# Patient Record
Sex: FEMALE | Race: WHITE
[De-identification: names, ages, dates, MRNs, and addresses within clinical notes are randomized per-mention and may not be internally consistent; named-entity substitution may affect disease eponyms.]

---

## 2017-06-04 ENCOUNTER — HOSPITAL ENCOUNTER (EMERGENCY)
Dept: HOSPITAL 80 - FED | Age: 42
LOS: 1 days | Discharge: HOME | End: 2017-06-05
Payer: COMMERCIAL

## 2017-06-04 VITALS — OXYGEN SATURATION: 94 % | TEMPERATURE: 98.1 F | RESPIRATION RATE: 16 BRPM

## 2017-06-04 DIAGNOSIS — S92.902A: Primary | ICD-10-CM
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
## 2017-06-04 PROCEDURE — 2W3RX1Z IMMOBILIZATION OF LEFT LOWER LEG USING SPLINT: ICD-10-PCS | Performed by: EMERGENCY MEDICINE
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
## 2017-06-05 VITALS — HEART RATE: 75 BPM | SYSTOLIC BLOOD PRESSURE: 124 MMHG | DIASTOLIC BLOOD PRESSURE: 86 MMHG

## 2019-06-21 ENCOUNTER — HOSPITAL ENCOUNTER (EMERGENCY)
Dept: HOSPITAL 80 - FED | Age: 44
Discharge: HOME | End: 2019-06-21
Payer: COMMERCIAL

## 2021-01-29 ENCOUNTER — OFFICE VISIT (OUTPATIENT)
Dept: FAMILY MEDICINE CLINIC | Age: 46
End: 2021-01-29
Payer: COMMERCIAL

## 2021-01-29 VITALS
HEIGHT: 66 IN | BODY MASS INDEX: 38.6 KG/M2 | SYSTOLIC BLOOD PRESSURE: 102 MMHG | HEART RATE: 90 BPM | WEIGHT: 240.2 LBS | TEMPERATURE: 98.1 F | DIASTOLIC BLOOD PRESSURE: 62 MMHG | OXYGEN SATURATION: 97 %

## 2021-01-29 DIAGNOSIS — M54.6 CHRONIC MIDLINE THORACIC BACK PAIN: ICD-10-CM

## 2021-01-29 DIAGNOSIS — Z79.899 HIGH RISK MEDICATION USE: ICD-10-CM

## 2021-01-29 DIAGNOSIS — G89.29 CHRONIC MIDLINE THORACIC BACK PAIN: ICD-10-CM

## 2021-01-29 DIAGNOSIS — M51.36 BULGING LUMBAR DISC: ICD-10-CM

## 2021-01-29 DIAGNOSIS — M47.812 CERVICAL SPONDYLOSIS: Primary | ICD-10-CM

## 2021-01-29 DIAGNOSIS — M48.02 FORAMINAL STENOSIS OF CERVICAL REGION: ICD-10-CM

## 2021-01-29 PROCEDURE — 99204 OFFICE O/P NEW MOD 45 MIN: CPT | Performed by: FAMILY MEDICINE

## 2021-01-29 RX ORDER — TRAMADOL HYDROCHLORIDE 50 MG/1
50 TABLET ORAL EVERY 8 HOURS PRN
Qty: 21 TABLET | Refills: 0 | Status: SHIPPED | OUTPATIENT
Start: 2021-01-29 | End: 2021-02-05

## 2021-01-29 RX ORDER — ALBUTEROL SULFATE 90 UG/1
AEROSOL, METERED RESPIRATORY (INHALATION)
COMMUNITY
End: 2021-04-27 | Stop reason: ALTCHOICE

## 2021-01-29 RX ORDER — ETONOGESTREL AND ETHINYL ESTRADIOL 11.7; 2.7 MG/1; MG/1
INSERT, EXTENDED RELEASE VAGINAL
COMMUNITY
End: 2021-04-27 | Stop reason: SDUPTHER

## 2021-01-29 RX ORDER — TRAMADOL HYDROCHLORIDE 50 MG/1
TABLET ORAL
COMMUNITY
End: 2021-01-29 | Stop reason: SDUPTHER

## 2021-01-29 RX ORDER — GABAPENTIN 300 MG/1
300 CAPSULE ORAL DAILY
COMMUNITY
End: 2021-01-29

## 2021-01-29 RX ORDER — DIAZEPAM 5 MG/1
5 TABLET ORAL 2 TIMES DAILY
COMMUNITY
End: 2021-04-26 | Stop reason: SDUPTHER

## 2021-01-29 RX ORDER — METHOCARBAMOL 750 MG/1
TABLET, FILM COATED ORAL
COMMUNITY
End: 2021-06-18 | Stop reason: SDUPTHER

## 2021-01-29 RX ORDER — CYCLOBENZAPRINE HCL 5 MG
TABLET ORAL
COMMUNITY
Start: 2021-01-19 | End: 2021-04-27 | Stop reason: ALTCHOICE

## 2021-01-29 SDOH — HEALTH STABILITY: MENTAL HEALTH: HOW OFTEN DO YOU HAVE A DRINK CONTAINING ALCOHOL?: NOT ASKED

## 2021-01-29 SDOH — HEALTH STABILITY: MENTAL HEALTH: HOW MANY STANDARD DRINKS CONTAINING ALCOHOL DO YOU HAVE ON A TYPICAL DAY?: NOT ASKED

## 2021-01-29 ASSESSMENT — PATIENT HEALTH QUESTIONNAIRE - PHQ9
SUM OF ALL RESPONSES TO PHQ QUESTIONS 1-9: 1
SUM OF ALL RESPONSES TO PHQ QUESTIONS 1-9: 1
SUM OF ALL RESPONSES TO PHQ9 QUESTIONS 1 & 2: 1
2. FEELING DOWN, DEPRESSED OR HOPELESS: 1
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
## 2021-02-01 LAB
6-ACETYLMORPHINE: NOT DETECTED
7-AMINOCLONAZEPAM: NOT DETECTED
ALPHA-OH-ALPRAZOLAM: NOT DETECTED
ALPRAZOLAM: NOT DETECTED
AMPHETAMINE: NOT DETECTED
BARBITURATES: NOT DETECTED
BENZOYLECGONINE: NOT DETECTED
BUPRENORPHINE: NOT DETECTED
CARISOPRODOL: NOT DETECTED
CLONAZEPAM: NOT DETECTED
CODEINE: NOT DETECTED
CREATININE URINE: 78.8 MG/DL (ref 20–400)
DIAZEPAM: NOT DETECTED
EER PAIN MGT DRUG PANEL, HIGH RES/EMIT U: NORMAL
ETHYL GLUCURONIDE: NOT DETECTED
FENTANYL: NOT DETECTED
HYDROCODONE: NOT DETECTED
HYDROMORPHONE: NOT DETECTED
LORAZEPAM: NOT DETECTED
MARIJUANA METABOLITE: NOT DETECTED
MDA: NOT DETECTED
MDEA: NOT DETECTED
MDMA URINE: NOT DETECTED
MEPERIDINE: NOT DETECTED
METHADONE: NOT DETECTED
METHAMPHETAMINE: NOT DETECTED
METHYLPHENIDATE: NOT DETECTED
MIDAZOLAM: NOT DETECTED
MORPHINE: NOT DETECTED
NORBUPRENORPHINE, FREE: NOT DETECTED
NORDIAZEPAM: NOT DETECTED
NORFENTANYL: NOT DETECTED
NORHYDROCODONE, URINE: NOT DETECTED
NOROXYCODONE: NOT DETECTED
NOROXYMORPHONE, URINE: NOT DETECTED
OXAZEPAM: NOT DETECTED
OXYCODONE: NOT DETECTED
OXYMORPHONE: NOT DETECTED
PAIN MANAGEMENT DRUG PANEL: NORMAL
PCP: NOT DETECTED
PHENTERMINE: NOT DETECTED
PROPOXYPHENE: NOT DETECTED
TAPENTADOL, URINE: NOT DETECTED
TAPENTADOL-O-SULFATE, URINE: NOT DETECTED
TEMAZEPAM: NOT DETECTED
TRAMADOL: PRESENT
ZOLPIDEM: NOT DETECTED

## 2021-03-04 ENCOUNTER — HOSPITAL ENCOUNTER (OUTPATIENT)
Dept: MRI IMAGING | Age: 46
Discharge: HOME | End: 2021-03-06
Payer: COMMERCIAL

## 2021-03-04 DIAGNOSIS — M54.6 CHRONIC MIDLINE THORACIC BACK PAIN: ICD-10-CM

## 2021-03-04 DIAGNOSIS — G89.29 CHRONIC MIDLINE THORACIC BACK PAIN: ICD-10-CM

## 2021-03-04 PROCEDURE — 72146 MRI CHEST SPINE W/O DYE: CPT

## 2021-03-29 ENCOUNTER — VIRTUAL VISIT (OUTPATIENT)
Dept: FAMILY MEDICINE CLINIC | Age: 46
End: 2021-03-29
Payer: COMMERCIAL

## 2021-03-29 ENCOUNTER — TELEPHONE (OUTPATIENT)
Dept: FAMILY MEDICINE CLINIC | Age: 46
End: 2021-03-29

## 2021-03-29 DIAGNOSIS — F07.81 POST-CONCUSSION SYNDROME: Primary | ICD-10-CM

## 2021-03-29 DIAGNOSIS — Z12.31 ENCOUNTER FOR SCREENING MAMMOGRAM FOR MALIGNANT NEOPLASM OF BREAST: ICD-10-CM

## 2021-03-29 PROCEDURE — 99442 PR PHYS/QHP TELEPHONE EVALUATION 11-20 MIN: CPT | Performed by: FAMILY MEDICINE

## 2021-03-29 RX ORDER — GABAPENTIN 600 MG/1
1200 TABLET ORAL 2 TIMES DAILY
Qty: 120 TABLET | Refills: 2 | COMMUNITY
Start: 2021-03-29 | End: 2021-04-27 | Stop reason: ALTCHOICE

## 2021-03-29 ASSESSMENT — PATIENT HEALTH QUESTIONNAIRE - PHQ9
SUM OF ALL RESPONSES TO PHQ QUESTIONS 1-9: 0
SUM OF ALL RESPONSES TO PHQ QUESTIONS 1-9: 0
SUM OF ALL RESPONSES TO PHQ9 QUESTIONS 1 & 2: 0
2. FEELING DOWN, DEPRESSED OR HOPELESS: 0
SUM OF ALL RESPONSES TO PHQ QUESTIONS 1-9: 0

## 2021-03-29 ASSESSMENT — ENCOUNTER SYMPTOMS
STRIDOR: 0
EYE ITCHING: 0
ABDOMINAL PAIN: 0
EYE DISCHARGE: 0
ANAL BLEEDING: 0
BACK PAIN: 0
PHOTOPHOBIA: 0
COUGH: 0
CHEST TIGHTNESS: 0
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
## 2021-04-12 ENCOUNTER — HOSPITAL ENCOUNTER (OUTPATIENT)
Dept: WOMENS IMAGING | Age: 46
Discharge: HOME | End: 2021-04-14
Payer: COMMERCIAL

## 2021-04-12 DIAGNOSIS — Z12.31 ENCOUNTER FOR SCREENING MAMMOGRAM FOR MALIGNANT NEOPLASM OF BREAST: ICD-10-CM

## 2021-04-12 PROCEDURE — 77063 BREAST TOMOSYNTHESIS BI: CPT

## 2021-04-27 ENCOUNTER — OFFICE VISIT (OUTPATIENT)
Dept: FAMILY MEDICINE CLINIC | Age: 46
End: 2021-04-27
Payer: COMMERCIAL

## 2021-04-27 VITALS
BODY MASS INDEX: 39.02 KG/M2 | HEIGHT: 66 IN | WEIGHT: 242.8 LBS | HEART RATE: 97 BPM | SYSTOLIC BLOOD PRESSURE: 114 MMHG | OXYGEN SATURATION: 98 % | DIASTOLIC BLOOD PRESSURE: 70 MMHG | TEMPERATURE: 98.1 F

## 2021-04-27 DIAGNOSIS — F07.81 POST-CONCUSSION SYNDROME: ICD-10-CM

## 2021-04-27 DIAGNOSIS — Z11.4 ENCOUNTER FOR SCREENING FOR HIV: ICD-10-CM

## 2021-04-27 DIAGNOSIS — Z11.59 NEED FOR HEPATITIS C SCREENING TEST: ICD-10-CM

## 2021-04-27 DIAGNOSIS — M54.50 LOW BACK PAIN, UNSPECIFIED BACK PAIN LATERALITY, UNSPECIFIED CHRONICITY, UNSPECIFIED WHETHER SCIATICA PRESENT: ICD-10-CM

## 2021-04-27 DIAGNOSIS — Z13.1 DIABETES MELLITUS SCREENING: ICD-10-CM

## 2021-04-27 DIAGNOSIS — Z13.220 LIPID SCREENING: ICD-10-CM

## 2021-04-27 DIAGNOSIS — M51.36 BULGING LUMBAR DISC: ICD-10-CM

## 2021-04-27 DIAGNOSIS — M48.02 FORAMINAL STENOSIS OF CERVICAL REGION: ICD-10-CM

## 2021-04-27 DIAGNOSIS — Z30.44 ENCOUNTER FOR SURVEILLANCE OF VAGINAL RING HORMONAL CONTRACEPTIVE DEVICE: Primary | ICD-10-CM

## 2021-04-27 DIAGNOSIS — M47.812 CERVICAL SPONDYLOSIS: ICD-10-CM

## 2021-04-27 PROCEDURE — 99214 OFFICE O/P EST MOD 30 MIN: CPT | Performed by: FAMILY MEDICINE

## 2021-04-27 RX ORDER — CYCLOBENZAPRINE HCL 10 MG
TABLET ORAL
COMMUNITY
Start: 2021-02-08 | End: 2021-09-09 | Stop reason: SDUPTHER

## 2021-04-27 RX ORDER — GABAPENTIN 300 MG/1
CAPSULE ORAL
COMMUNITY
Start: 2021-03-17 | End: 2021-05-12 | Stop reason: SDUPTHER

## 2021-04-27 RX ORDER — ETONOGESTREL AND ETHINYL ESTRADIOL 11.7; 2.7 MG/1; MG/1
INSERT, EXTENDED RELEASE VAGINAL
Qty: 3 EACH | Refills: 1 | Status: SHIPPED | OUTPATIENT
Start: 2021-04-27 | End: 2021-07-26 | Stop reason: SDUPTHER

## 2021-04-27 NOTE — PATIENT INSTRUCTIONS
Patient Education        Learning About Low-Carbohydrate Diets  What is a low-carbohydrate diet? A low-carbohydrate (or \"low-carb\") diet limits foods and drinks that have carbohydrates. This includes grains, fruits, milk and yogurt, and starchy vegetables like potatoes, beans, and corn. It also avoids foods and drinks that have added sugar. Instead, low-carb diets include foods that are high in protein and fat. Why might you follow a low-carb diet? Low-carb diets may be used for a variety of reasons, such as for weight loss. People who have diabetes may use a low-carb diet to help manage their blood sugar levels. What should you do before you start the diet? Talk to your doctor before you try any diet. This is even more important if you have health problems like kidney disease, heart disease, or diabetes. Your doctor may suggest that you meet with a registered dietitian. A dietitian can help you make an eating plan that works for you. What foods do you eat on a low-carb diet? On a low-carb diet, you choose foods that are high in protein and fat. Examples of these are:  · Meat, poultry, and fish. · Eggs. · Nuts, such as walnuts, pecans, almonds, and peanuts. · Peanut butter and other nut butters. · Tofu. · Avocado. · Lord Tobias. · Non-starchy vegetables like broccoli, cauliflower, green beans, mushrooms, peppers, lettuce, and spinach. · Unsweetened non-dairy milks like almond milk and coconut milk. · Cheese, cottage cheese, and cream cheese. Current as of: December 17, 2020               Content Version: 12.8  © 2006-2021 [employer], [employer]. Care instructions adapted under license by [facility] [place] ([facility]). If you have questions about a medical condition or this instruction, always ask your healthcare professional. [address] any warranty or liability for your use of this information.
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
## 2021-04-28 PROBLEM — F07.81 POST-CONCUSSION SYNDROME: Status: ACTIVE | Noted: 2021-04-28

## 2021-04-28 PROBLEM — M51.36 BULGING LUMBAR DISC: Status: ACTIVE | Noted: 2021-04-28

## 2021-04-28 PROBLEM — M51.369 BULGING LUMBAR DISC: Status: ACTIVE | Noted: 2021-04-28

## 2021-04-28 PROBLEM — M47.812 CERVICAL SPONDYLOSIS: Status: ACTIVE | Noted: 2021-04-28

## 2021-04-28 PROBLEM — M48.02 FORAMINAL STENOSIS OF CERVICAL REGION: Status: ACTIVE | Noted: 2021-04-28

## 2021-05-12 ENCOUNTER — TELEPHONE (OUTPATIENT)
Dept: NEUROLOGY | Age: 46
End: 2021-05-12

## 2021-05-12 ENCOUNTER — OFFICE VISIT (OUTPATIENT)
Dept: NEUROLOGY | Age: 46
End: 2021-05-12
Payer: COMMERCIAL

## 2021-05-12 VITALS
WEIGHT: 244.3 LBS | BODY MASS INDEX: 39.43 KG/M2 | DIASTOLIC BLOOD PRESSURE: 81 MMHG | SYSTOLIC BLOOD PRESSURE: 121 MMHG | HEART RATE: 87 BPM

## 2021-05-12 DIAGNOSIS — F07.81 POST-CONCUSSION SYNDROME: Primary | ICD-10-CM

## 2021-05-12 DIAGNOSIS — M47.812 CERVICAL SPONDYLOSIS: ICD-10-CM

## 2021-05-12 DIAGNOSIS — Z76.89 ENCOUNTER TO ESTABLISH CARE: ICD-10-CM

## 2021-05-12 PROCEDURE — 99204 OFFICE O/P NEW MOD 45 MIN: CPT | Performed by: NURSE PRACTITIONER

## 2021-05-12 RX ORDER — GABAPENTIN 300 MG/1
1200 CAPSULE ORAL 2 TIMES DAILY
Qty: 240 CAPSULE | Refills: 1 | Status: SHIPPED | OUTPATIENT
Start: 2021-05-12 | End: 2021-07-12 | Stop reason: SDUPTHER

## 2021-05-12 ASSESSMENT — ENCOUNTER SYMPTOMS
COUGH: 0
ABDOMINAL PAIN: 0
CONSTIPATION: 0
NAUSEA: 1
WHEEZING: 0
TROUBLE SWALLOWING: 0
ABDOMINAL DISTENTION: 0
SHORTNESS OF BREATH: 0
VOMITING: 0
DIARRHEA: 0
PHOTOPHOBIA: 1
COLOR CHANGE: 0
CHEST TIGHTNESS: 0

## 2021-05-12 NOTE — TELEPHONE ENCOUNTER
Pt called back and states that she seen [facility]   Dr. Yelena Gibson  [address] [address] [phone]    She states that she did have a neuro psych test don in august of 2019. She would like to know if you still want her to see Dr. Brianda Pat or if you just want that report.
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
## 2021-05-12 NOTE — TELEPHONE ENCOUNTER
Called patient to get info in regards to prior neurologist that she seen.   We need name and number if she has so that we can call and request recorders for monica

## 2021-05-20 ENCOUNTER — HOSPITAL ENCOUNTER (OUTPATIENT)
Dept: PHYSICAL THERAPY | Age: 46
Setting detail: THERAPIES SERIES
Discharge: HOME | End: 2021-05-20
Payer: COMMERCIAL

## 2021-05-20 PROCEDURE — 97162 PT EVAL MOD COMPLEX 30 MIN: CPT

## 2021-05-20 PROCEDURE — 97110 THERAPEUTIC EXERCISES: CPT

## 2021-05-20 PROCEDURE — 97140 MANUAL THERAPY 1/> REGIONS: CPT

## 2021-05-20 ASSESSMENT — PAIN SCALES - GENERAL: PAINLEVEL_OUTOF10: 4

## 2021-05-20 ASSESSMENT — PAIN DESCRIPTION - FREQUENCY: FREQUENCY: CONTINUOUS

## 2021-05-20 ASSESSMENT — PAIN DESCRIPTION - PAIN TYPE: TYPE: CHRONIC PAIN

## 2021-05-20 ASSESSMENT — PAIN DESCRIPTION - DESCRIPTORS: DESCRIPTORS: ACHING;SHARP

## 2021-05-20 ASSESSMENT — PAIN DESCRIPTION - DIRECTION: RADIATING_TOWARDS: RIGHT HAND

## 2021-05-20 NOTE — PROGRESS NOTES
Frame for Long term goals : 4-6 weeks  Long term goal 1: Pt reports neck and right shoulder pain 2/10 or less with ADLs  Long term goal 2: Pt reports a decrease in NT down R UE by 75% or better  Long term goal 3: Demonstrates full cervical and R UE AROM with no reports of pain or NT down her arm  Long term goal 4: Increase neck and B UE strength to 4+/5 or > so that pt can lift overhead without pain  Long term goal 5: Pt indep with HEP  Patient Goals   Patient goals :  To get rid of her neck and shoulder pain with NT down her R UE       Therapy Time   Individual Concurrent Group Co-treatment   Time In  2:00pm         Time Out  3;10pm         Minutes  70 min                  NEHEMIAS Davis#[id number]

## 2021-05-24 ENCOUNTER — HOSPITAL ENCOUNTER (OUTPATIENT)
Dept: PHYSICAL THERAPY | Age: 46
Setting detail: THERAPIES SERIES
End: 2021-05-24
Payer: COMMERCIAL

## 2021-05-25 ENCOUNTER — HOSPITAL ENCOUNTER (OUTPATIENT)
Dept: PHYSICAL THERAPY | Age: 46
Setting detail: THERAPIES SERIES
Discharge: HOME | End: 2021-05-25
Payer: COMMERCIAL

## 2021-05-25 PROCEDURE — 97110 THERAPEUTIC EXERCISES: CPT

## 2021-05-25 PROCEDURE — 97140 MANUAL THERAPY 1/> REGIONS: CPT

## 2021-05-25 PROCEDURE — G0283 ELEC STIM OTHER THAN WOUND: HCPCS

## 2021-05-25 ASSESSMENT — PAIN SCALES - GENERAL: PAINLEVEL_OUTOF10: 3

## 2021-05-25 ASSESSMENT — PAIN DESCRIPTION - ORIENTATION: ORIENTATION: RIGHT

## 2021-05-25 ASSESSMENT — PAIN DESCRIPTION - LOCATION: LOCATION: ARM;NECK
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
## 2021-05-27 ENCOUNTER — TELEPHONE (OUTPATIENT)
Dept: NEUROLOGY | Age: 46
End: 2021-05-27

## 2021-05-28 ENCOUNTER — HOSPITAL ENCOUNTER (OUTPATIENT)
Dept: PHYSICAL THERAPY | Age: 46
Setting detail: THERAPIES SERIES
Discharge: HOME | End: 2021-05-28
Payer: COMMERCIAL

## 2021-05-28 PROCEDURE — 97140 MANUAL THERAPY 1/> REGIONS: CPT

## 2021-05-28 PROCEDURE — 97110 THERAPEUTIC EXERCISES: CPT

## 2021-05-28 PROCEDURE — G0283 ELEC STIM OTHER THAN WOUND: HCPCS

## 2021-05-28 NOTE — PROGRESS NOTES
spondylosis without myelopathy, cervical radiculopathy and Right UE pain  Prognosis: Good  Decision Making: Medium Complexity  REQUIRES PT FOLLOW UP: Yes      G-Code:     OutComes Score                                                     Goals:  Short term goals  Time Frame for Short term goals: 1-2 weeks  Short term goal 1: Pt reports able to do 3x per week  Short term goal 2: Pt reports a decrease in NT down R UE by 25% or bettter  Long term goals  Time Frame for Long term goals : 4-6 weeks  Long term goal 1: Pt reports neck and right shoulder pain 2/10 or less with ADLs  Long term goal 2: Pt reports a decrease in NT down R UE by 75% or better  Long term goal 3: Demonstrates full cervical and R UE AROM with no reports of pain or NT down her arm  Long term goal 4: Increase neck and B UE strength to 4+/5 or > so that pt can lift overhead without pain  Long term goal 5: Pt indep with HEP  Patient Goals   Patient goals :  To get rid of her neck and shoulder pain with NT down her R UE    Plan:      Times per week: 2-3  Plan weeks: 4-6  Current Treatment Recommendations: Strengthening, Home Exercise Program, ROM, Manual Therapy - Soft Tissue Mobilization, Safety Education & Training, Patient/Caregiver Education & Training, Manual Therapy - Joint Manipulation, Modalities, Endurance Training  Plan Comment: Okay for gurinder, US, KT tape, CP/HP        Therapy Time   Individual Concurrent Group Co-treatment   Time In  100         Time Out  215         Minutes  [facility], [facility]  License and [place] [address] Number: [id number]

## 2021-06-01 ENCOUNTER — HOSPITAL ENCOUNTER (OUTPATIENT)
Dept: PHYSICAL THERAPY | Age: 46
Setting detail: THERAPIES SERIES
Discharge: HOME | End: 2021-06-01
Payer: COMMERCIAL

## 2021-06-01 ENCOUNTER — TELEPHONE (OUTPATIENT)
Dept: PAIN MANAGEMENT | Age: 46
End: 2021-06-01

## 2021-06-01 PROCEDURE — 97035 APP MDLTY 1+ULTRASOUND EA 15: CPT

## 2021-06-01 PROCEDURE — 97140 MANUAL THERAPY 1/> REGIONS: CPT

## 2021-06-01 PROCEDURE — 97110 THERAPEUTIC EXERCISES: CPT
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
## 2021-06-01 NOTE — TELEPHONE ENCOUNTER
PT. CAME IN FOR F/UP THIS AM WITH CC. DID NOT GET MESSAGE THAT APPT. NEEDED RESCHEDULED. PLEASE CALL PT. BACK  TO SET UP APPT.     PT. WOULD LIKE TO DISCUSS NEXT INJECTIONS AS WELL AS POSSIBLE TREATMENT FOR INCREASE IN MIGRAINE EPISODES WHICH IS CAUSING HER ANXIETY ISSUES.  [phone]

## 2021-06-03 ENCOUNTER — HOSPITAL ENCOUNTER (OUTPATIENT)
Dept: PHYSICAL THERAPY | Age: 46
Setting detail: THERAPIES SERIES
Discharge: HOME | End: 2021-06-03
Payer: COMMERCIAL

## 2021-06-03 PROCEDURE — 97140 MANUAL THERAPY 1/> REGIONS: CPT

## 2021-06-03 PROCEDURE — 97110 THERAPEUTIC EXERCISES: CPT

## 2021-06-03 PROCEDURE — 97035 APP MDLTY 1+ULTRASOUND EA 15: CPT

## 2021-06-08 ENCOUNTER — HOSPITAL ENCOUNTER (OUTPATIENT)
Dept: PHYSICAL THERAPY | Age: 46
Setting detail: THERAPIES SERIES
Discharge: HOME | End: 2021-06-08
Payer: COMMERCIAL

## 2021-06-08 PROCEDURE — 97140 MANUAL THERAPY 1/> REGIONS: CPT

## 2021-06-08 PROCEDURE — 97530 THERAPEUTIC ACTIVITIES: CPT

## 2021-06-08 PROCEDURE — 97035 APP MDLTY 1+ULTRASOUND EA 15: CPT

## 2021-06-08 ASSESSMENT — PAIN DESCRIPTION - ORIENTATION: ORIENTATION: RIGHT;LEFT

## 2021-06-08 ASSESSMENT — PAIN DESCRIPTION - DESCRIPTORS: DESCRIPTORS: ACHING

## 2021-06-08 ASSESSMENT — PAIN DESCRIPTION - FREQUENCY: FREQUENCY: CONTINUOUS

## 2021-06-08 ASSESSMENT — PAIN DESCRIPTION - LOCATION: LOCATION: NECK

## 2021-06-08 ASSESSMENT — PAIN DESCRIPTION - PAIN TYPE: TYPE: CHRONIC PAIN
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
## 2021-06-11 ENCOUNTER — HOSPITAL ENCOUNTER (OUTPATIENT)
Dept: PHYSICAL THERAPY | Age: 46
Setting detail: THERAPIES SERIES
Discharge: HOME | End: 2021-06-11
Payer: COMMERCIAL

## 2021-06-11 PROCEDURE — 97035 APP MDLTY 1+ULTRASOUND EA 15: CPT

## 2021-06-11 PROCEDURE — 97110 THERAPEUTIC EXERCISES: CPT

## 2021-06-11 PROCEDURE — 97140 MANUAL THERAPY 1/> REGIONS: CPT

## 2021-06-11 ASSESSMENT — PAIN DESCRIPTION - ORIENTATION: ORIENTATION: RIGHT;LEFT

## 2021-06-11 ASSESSMENT — PAIN DESCRIPTION - FREQUENCY: FREQUENCY: CONTINUOUS

## 2021-06-11 ASSESSMENT — PAIN DESCRIPTION - LOCATION: LOCATION: NECK;SHOULDER

## 2021-06-11 ASSESSMENT — PAIN DESCRIPTION - PAIN TYPE: TYPE: CHRONIC PAIN

## 2021-06-11 ASSESSMENT — PAIN DESCRIPTION - DESCRIPTORS: DESCRIPTORS: ACHING
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
## 2021-06-14 ENCOUNTER — HOSPITAL ENCOUNTER (OUTPATIENT)
Dept: PHYSICAL THERAPY | Age: 46
Setting detail: THERAPIES SERIES
Discharge: HOME | End: 2021-06-14
Payer: COMMERCIAL

## 2021-06-14 PROCEDURE — 97140 MANUAL THERAPY 1/> REGIONS: CPT

## 2021-06-14 PROCEDURE — G0283 ELEC STIM OTHER THAN WOUND: HCPCS

## 2021-06-14 PROCEDURE — 97110 THERAPEUTIC EXERCISES: CPT

## 2021-06-14 ASSESSMENT — PAIN DESCRIPTION - ORIENTATION: ORIENTATION: MID

## 2021-06-14 ASSESSMENT — PAIN DESCRIPTION - LOCATION: LOCATION: NECK;BACK

## 2021-06-14 ASSESSMENT — PAIN SCALES - GENERAL: PAINLEVEL_OUTOF10: 3

## 2021-06-14 ASSESSMENT — PAIN DESCRIPTION - DESCRIPTORS: DESCRIPTORS: ACHING;DULL

## 2021-06-14 ASSESSMENT — PAIN DESCRIPTION - PAIN TYPE: TYPE: CHRONIC PAIN
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
## 2021-06-18 ENCOUNTER — TELEPHONE (OUTPATIENT)
Dept: PAIN MANAGEMENT | Age: 46
End: 2021-06-18

## 2021-06-18 ENCOUNTER — OFFICE VISIT (OUTPATIENT)
Dept: PAIN MANAGEMENT | Age: 46
End: 2021-06-18
Payer: COMMERCIAL

## 2021-06-18 VITALS
SYSTOLIC BLOOD PRESSURE: 130 MMHG | RESPIRATION RATE: 16 BRPM | TEMPERATURE: 97.5 F | BODY MASS INDEX: 39.21 KG/M2 | HEART RATE: 101 BPM | DIASTOLIC BLOOD PRESSURE: 95 MMHG | WEIGHT: 244 LBS | HEIGHT: 66 IN

## 2021-06-18 DIAGNOSIS — M47.812 CERVICAL SPONDYLOSIS WITHOUT MYELOPATHY: ICD-10-CM

## 2021-06-18 DIAGNOSIS — M54.12 CERVICAL RADICULOPATHY: Primary | ICD-10-CM

## 2021-06-18 PROCEDURE — 99213 OFFICE O/P EST LOW 20 MIN: CPT | Performed by: ANESTHESIOLOGY

## 2021-06-18 RX ORDER — METHOCARBAMOL 750 MG/1
750 TABLET, FILM COATED ORAL DAILY
Qty: 30 TABLET | Refills: 1 | Status: SHIPPED | OUTPATIENT
Start: 2021-06-18 | End: 2021-11-11

## 2021-06-18 NOTE — TELEPHONE ENCOUNTER
ORDER PLACED:    Date: 06/18/2  Description: RIGHT C7 SNRB  Order Number: [id number]  Ordering Provider: [facility]  Performing Provider: [facility]  CPT Codes: 50417  ICD10 Codes: M54.12

## 2021-06-18 NOTE — PROGRESS NOTES
Patient:  Nikolai Cruz  YOB: 1975  Date: 6/18/2021      Subjective:     Nikolai Cruz is a 40 y.o. female who presents today with:    Chief Complaint   Patient presents with    Neck Pain    Arm Pain     right arm nerve pain into the hand        HPI: The patient presents to the clinic reporting that right C6 selective nerve root block on April 29 has worked very well with a greater than 80% of pain relief. She is now interested in getting the same thing at the C7 level. She has been taking physical therapy in the stage. Other than some tingling sensation in the middle finger she is doing great. Allergies:  Nickel, Other, Oxycodone-acetaminophen, Neosporin  [bacitracin-polymyxin b], Vitamin b12, Azithromycin, and Vitamin b complex  [b complex]  Past Medical History:   Diagnosis Date    HLD (hyperlipidemia)     Migraine with aura and without status migrainosus, not intractable      Past Surgical History:   Procedure Laterality Date    HAND SURGERY Right     Middle finger trigger finger release    TONSILLECTOMY       Social History     Socioeconomic History    Marital status: Single     Spouse name: Not on file    Number of children: Not on file    Years of education: Not on file    Highest education level: Not on file   Occupational History    Not on file   Tobacco Use    Smoking status: Never Smoker    Smokeless tobacco: Current User     Types: Chew   Substance and Sexual Activity    Alcohol use:  Yes    Drug use: Never    Sexual activity: Not on file   Other Topics Concern    Not on file   Social History Narrative    Not on file     Social Determinants of Health     Financial Resource Strain: Medium Risk    Difficulty of Paying Living Expenses: Somewhat hard   Food Insecurity: No Food Insecurity    Worried About Running Out of Food in the Last Year: Never true    Karthik of Food in the Last Year: Never true   Transportation Needs: No Transportation Needs    Lack of and remote memory intact, mood and affect normal, judgement and insight normal. Strength functional for ambulation. Balance and coordinational functional. Visualized skin intact. No visualized cyanosis, pulses intact. Cranial nerves 2-12 grossly intact. No obvious upper motor neuron signs seen. Sensation intact to light touch. Pain radiates down from the neck all the way to the middle and ring fingers with some tingling in the fingers. Radiculopathy:  Positive    Studies Review:  Reviewed MRI report of Cervical spine dated 8/19/2020. Assessment:           Diagnosis Orders   1. Cervical radiculopathy  GA NJX DX/THER SBST INTRLMNR CRV/THRC W/IMG GDN   2. Cervical spondylosis without myelopathy  GA NJX DX/THER SBST INTRLMNR CRV/THRC W/IMG GDN         Plan:     Orders Placed This Encounter   Procedures    GA RKH DX/THER SBST INTRLMNR CRV/THRC W/IMG GDN     Standing Status:   Future     Standing Expiration Date:   9/16/2021       No orders of the defined types were placed in this encounter. For radicular pain in the C7 level we will go ahead and schedule for right C7 selective nerve root block. The patient would like to stay on Robaxin occasionally. Follow up:  Return for 1-2 week SNRB.     Grecia Wu MD

## 2021-06-21 DIAGNOSIS — M54.12 CERVICAL RADICULOPATHY: ICD-10-CM

## 2021-06-21 DIAGNOSIS — M47.812 CERVICAL SPONDYLOSIS WITHOUT MYELOPATHY: ICD-10-CM

## 2021-06-21 RX ORDER — DIAZEPAM 5 MG/1
10 TABLET ORAL DAILY
Qty: 2 TABLET | Refills: 0 | Status: SHIPPED | OUTPATIENT
Start: 2021-06-21 | End: 2021-06-22

## 2021-06-21 NOTE — TELEPHONE ENCOUNTER
Requested Prescriptions     Pending Prescriptions Disp Refills    diazePAM (VALIUM) 5 MG tablet 2 tablet 0     Sig: Take 2 tablets by mouth daily for 1 day. Take two tabs one hour prior to procedure.        Patient last seen on:  6/18  Date of last surgery:  n/a  Date of last refill:  4/26  Pain level:  n/a  Patient complaining of:  Pt asking for refill for her RIGHT C7 SNRB  Future appts: 6/23

## 2021-06-22 ENCOUNTER — HOSPITAL ENCOUNTER (OUTPATIENT)
Dept: PHYSICAL THERAPY | Age: 46
Setting detail: THERAPIES SERIES
Discharge: HOME | End: 2021-06-22
Payer: COMMERCIAL

## 2021-06-22 PROCEDURE — 97140 MANUAL THERAPY 1/> REGIONS: CPT

## 2021-06-22 PROCEDURE — 97110 THERAPEUTIC EXERCISES: CPT

## 2021-06-22 PROCEDURE — 97530 THERAPEUTIC ACTIVITIES: CPT

## 2021-06-22 ASSESSMENT — PAIN DESCRIPTION - FREQUENCY: FREQUENCY: CONTINUOUS

## 2021-06-22 ASSESSMENT — PAIN DESCRIPTION - ORIENTATION: ORIENTATION: MID

## 2021-06-22 ASSESSMENT — PAIN DESCRIPTION - DESCRIPTORS: DESCRIPTORS: ACHING;DULL

## 2021-06-22 ASSESSMENT — PAIN DESCRIPTION - PAIN TYPE: TYPE: CHRONIC PAIN

## 2021-06-22 ASSESSMENT — PAIN DESCRIPTION - LOCATION: LOCATION: NECK;BACK
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
## 2021-06-23 ENCOUNTER — PROCEDURE VISIT (OUTPATIENT)
Dept: PAIN MANAGEMENT | Age: 46
End: 2021-06-23
Payer: COMMERCIAL

## 2021-06-23 DIAGNOSIS — M54.12 CERVICAL RADICULOPATHY: Primary | ICD-10-CM

## 2021-06-23 PROCEDURE — 64479 NJX AA&/STRD TFRM EPI C/T 1: CPT | Performed by: ANESTHESIOLOGY

## 2021-06-23 RX ORDER — LIDOCAINE HYDROCHLORIDE 10 MG/ML
0.5 INJECTION, SOLUTION EPIDURAL; INFILTRATION; INTRACAUDAL; PERINEURAL ONCE
Status: COMPLETED | OUTPATIENT
Start: 2021-06-23 | End: 2021-06-23

## 2021-06-23 RX ORDER — DEXAMETHASONE SODIUM PHOSPHATE 10 MG/ML
10 INJECTION, EMULSION INTRAMUSCULAR; INTRAVENOUS ONCE
Status: COMPLETED | OUTPATIENT
Start: 2021-06-23 | End: 2021-06-23

## 2021-06-23 RX ADMIN — DEXAMETHASONE SODIUM PHOSPHATE 10 MG: 10 INJECTION, EMULSION INTRAMUSCULAR; INTRAVENOUS at 11:44

## 2021-06-23 RX ADMIN — LIDOCAINE HYDROCHLORIDE 0.5 ML: 10 INJECTION, SOLUTION EPIDURAL; INFILTRATION; INTRACAUDAL; PERINEURAL at 11:45

## 2021-06-25 ENCOUNTER — HOSPITAL ENCOUNTER (OUTPATIENT)
Dept: PHYSICAL THERAPY | Age: 46
Setting detail: THERAPIES SERIES
End: 2021-06-25
Payer: COMMERCIAL

## 2021-06-25 ENCOUNTER — HOSPITAL ENCOUNTER (OUTPATIENT)
Dept: PHYSICAL THERAPY | Age: 46
Setting detail: THERAPIES SERIES
Discharge: HOME | End: 2021-06-25
Payer: COMMERCIAL

## 2021-06-25 PROCEDURE — 97140 MANUAL THERAPY 1/> REGIONS: CPT

## 2021-06-25 PROCEDURE — G0283 ELEC STIM OTHER THAN WOUND: HCPCS

## 2021-06-25 PROCEDURE — 97110 THERAPEUTIC EXERCISES: CPT

## 2021-06-25 NOTE — PROGRESS NOTES
mobs throacic spine to dec pain and improve mobility   Soft Tissue Mobalization: MFR throacic paraspinals to dec tightness inc tone and tenderness upon palpation. Assessment:   Conditions Requiring Skilled Therapeutic Intervention  Body structures, Functions, Activity limitations: Decreased functional mobility ; Decreased endurance;Decreased ROM; Increased pain;Decreased strength  Assessment: Pt. continues to demo limited mobility due to pain. But better tolerated JACOB post Cortisone injection recieved on Wed. Reviewedand educated use of cervical inflatable traction collar to reduce pain adn parasthesias. Good releief HO issued for self purchase. Pt.   demos inc tone and tenderness throacis spine upon palpation PA mobs and MFR to dec tightenss follwed by Estim and  for further releif and relaxation. Pain post \"feeling alot better per pt. \"    Treatment Diagnosis: Cervical spondylosis without myelopathy, cervical radiculopathy and Right UE pain  Prognosis: Good  REQUIRES PT FOLLOW UP: Yes  Activity Tolerance  Activity Tolerance: Patient limited by pain        Goals:  Short term goals  Time Frame for Short term goals: 1-2 weeks  Short term goal 1: Pt reports able to do HEP  3x per week (  6/22/21 met pt reports HEP BID 7/7 days)  Short term goal 2: Pt reports a decrease in NT down R UE by 25% or bettter (In progeress, part relieved in index, middle & ring remain)  Long term goals  Time Frame for Long term goals : 8-10 weeks  Long term goal 1: Pt reports neck and right shoulder pain 2/10 or less with ADLs (in progess less pain since eval on 6/22/21)  Long term goal 2: Pt reports a decrease in NT down R UE by 75% or better (6/22/21 in progress with injection to come from MD)  Long term goal 3: Demonstrates full cervical and R UE AROM with no reports of pain or NT down her arm (in progress 6/22/21 limited in R shoulder AROM with pain)  Long term goal 4:  Increase neck and B UE strength to 4+/5 or > so that pt can lift overhead without pain (in progress,  improving 6/22/21, ROM and pain limited)  Long term goal 5: Pt indep with HEP (6/22/21 In Progress 2x day, light program currently )  Patient Goals   Patient goals :  To get rid of her neck and shoulder pain with NT down her R UE (6/22/21 in progress, educated chronic but should reduce)    Plan:      continue 2x week for pain management, ROM, strengthening and function        Therapy Time   Individual Concurrent Group Co-treatment   Time In  1200         Time Out  100         Minutes  [address] [place] [address]  License and [address] Number: [id number]

## 2021-06-28 ENCOUNTER — HOSPITAL ENCOUNTER (OUTPATIENT)
Dept: PHYSICAL THERAPY | Age: 46
Setting detail: THERAPIES SERIES
Discharge: HOME | End: 2021-06-28
Payer: COMMERCIAL

## 2021-06-28 PROCEDURE — 97140 MANUAL THERAPY 1/> REGIONS: CPT

## 2021-06-28 PROCEDURE — G0283 ELEC STIM OTHER THAN WOUND: HCPCS

## 2021-06-28 ASSESSMENT — PAIN DESCRIPTION - ORIENTATION: ORIENTATION: MID;UPPER

## 2021-06-28 ASSESSMENT — PAIN SCALES - GENERAL: PAINLEVEL_OUTOF10: 4

## 2021-06-28 ASSESSMENT — PAIN DESCRIPTION - FREQUENCY: FREQUENCY: CONTINUOUS

## 2021-06-28 ASSESSMENT — PAIN DESCRIPTION - PAIN TYPE: TYPE: CHRONIC PAIN

## 2021-06-28 ASSESSMENT — PAIN DESCRIPTION - LOCATION: LOCATION: NECK;BACK
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
## 2021-07-06 ENCOUNTER — HOSPITAL ENCOUNTER (OUTPATIENT)
Dept: PHYSICAL THERAPY | Age: 46
Setting detail: THERAPIES SERIES
Discharge: HOME | End: 2021-07-06
Payer: COMMERCIAL

## 2021-07-06 PROCEDURE — 97110 THERAPEUTIC EXERCISES: CPT

## 2021-07-06 PROCEDURE — 97140 MANUAL THERAPY 1/> REGIONS: CPT

## 2021-07-06 ASSESSMENT — PAIN DESCRIPTION - DESCRIPTORS: DESCRIPTORS: TIGHTNESS

## 2021-07-06 ASSESSMENT — PAIN DESCRIPTION - LOCATION: LOCATION: CHEST;NECK;BACK

## 2021-07-06 ASSESSMENT — PAIN DESCRIPTION - ORIENTATION: ORIENTATION: MID;UPPER

## 2021-07-06 ASSESSMENT — PAIN DESCRIPTION - FREQUENCY: FREQUENCY: CONTINUOUS

## 2021-07-06 ASSESSMENT — PAIN DESCRIPTION - PAIN TYPE: TYPE: CHRONIC PAIN
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
## 2021-07-07 ENCOUNTER — APPOINTMENT (OUTPATIENT)
Dept: PHYSICAL THERAPY | Age: 46
End: 2021-07-07
Payer: COMMERCIAL

## 2021-07-09 ENCOUNTER — HOSPITAL ENCOUNTER (OUTPATIENT)
Dept: PHYSICAL THERAPY | Age: 46
Setting detail: THERAPIES SERIES
Discharge: HOME | End: 2021-07-09
Payer: COMMERCIAL

## 2021-07-09 PROCEDURE — 97140 MANUAL THERAPY 1/> REGIONS: CPT

## 2021-07-09 PROCEDURE — 97110 THERAPEUTIC EXERCISES: CPT

## 2021-07-09 ASSESSMENT — PAIN DESCRIPTION - FREQUENCY
FREQUENCY: CONTINUOUS
FREQUENCY: CONTINUOUS

## 2021-07-09 ASSESSMENT — PAIN SCALES - GENERAL
PAINLEVEL_OUTOF10: 2
PAINLEVEL_OUTOF10: 2

## 2021-07-09 ASSESSMENT — PAIN DESCRIPTION - PAIN TYPE
TYPE: CHRONIC PAIN
TYPE: CHRONIC PAIN

## 2021-07-09 ASSESSMENT — PAIN DESCRIPTION - ORIENTATION
ORIENTATION: RIGHT
ORIENTATION: RIGHT

## 2021-07-09 ASSESSMENT — PAIN DESCRIPTION - LOCATION
LOCATION: BACK;NECK
LOCATION: NECK;BACK

## 2021-07-09 ASSESSMENT — PAIN DESCRIPTION - DESCRIPTORS
DESCRIPTORS: TIGHTNESS
DESCRIPTORS: TIGHTNESS

## 2021-07-09 NOTE — PROGRESS NOTES
Physical Therapy  Daily Treatment Note  Date: 2021  Patient Name: Pratima Ragsdale  MRN: [id number]     :   1975    Subjective:      PT Visit Information  Onset Date: 19  Total # of Visits Approved: 18  Total # of Visits to Date: 13  Plan of Care/Certification Expiration Date: 21  No Show: 0  Canceled Appointment: 1  Subjective  Subjective: Pt. states she still is having R hand symptoms and mddle and ring finger on RUE. Pain currently base of neck and R shd. 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 Pt. states upper back stiffness. Pt. states taking muscle relaxer on occasion. Massage helps. Pain Screening  Patient Currently in Pain: Yes  Pain Assessment  Pain Assessment: 0-10  Pain Level: 2  Pain Type: Chronic pain  Pain Location: Back;Neck  Pain Orientation: Right  Pain Descriptors: Tightness  Pain Frequency: Continuous  Vital Signs  Patient Currently in Pain: Yes       Treatment Activities:   Manual therapy  Joint mobilization: Cervical PA mobs OA mobs grade I-II for pain releif, R facet mobilization grade I-II to dec tension and pain. Suboccipital release, Prone B scap mobs to dec tightness and impove mobility   PROM: PROM cervical spine and stretching manually to B pecs with cervical SB and Rot  pt. limited with L SB reproduced RUE symptoms thus empty end feel with pain up to 4/10 and radicular to R fingertips 3rd and 4th digit. Soft Tissue Mobalization:   MFR in prone T4 -T12 to dec tightness and pain MFR cervical paraspinals. Exercises  Exercise 7: doorway stretch 30 sec x 3   Exercise 9: Cervical retraction with extension x 10'  with towel at base of neck   Exercise 10: supine wand flexion attempted inc pain into anterior R arm and bicep   Exercise 11: supine wand shd   Exercise 12: B  ER x 10 hold 5 sec   Exercise 13: shd pulleys flexion and abd x 5 reps ea 3 sec hold has pulley system at home will put up. Manual therapy  Joint mobilization: Cervical PA mobs OA mobs grade I-II for pain releif, R facet mobilization grade I-II to dec tension and pain. Suboccipital release, Prone B scap mobs to dec tightness and impove mobility   PROM: PROM cervical spine and stretching manually to B pecs with cervical SB and Rot  pt. limited with L SB reproduced RUE symptoms thus empty end feel with pain up to 4/10 and radicular to R fingertips 3rd and 4th digit. Soft Tissue Mobalization:   MFR in prone T4 -T12 to dec tightness and pain MFR cervical paraspinals. Assessment:   Conditions Requiring Skilled Therapeutic Intervention  Body structures, Functions, Activity limitations: Decreased functional mobility ; Decreased endurance;Decreased ROM; Increased pain;Decreased strength  Assessment: Pt. able to initiate shoulder pulleys this date to improve AROM of BUE. Limited by pain however and limited reps tolerated. Pt. still with frequent RUE symptoms and radicular pain depends on activity and how much. Pt. encouraged to take breaks and use CP PRN for pain reduction. Pain post 1.5/10. Pt. to ice at home. Treatment Diagnosis: Cervical spondylosis without myelopathy, cervical radiculopathy and Right UE pain  Prognosis: Good  REQUIRES PT FOLLOW UP: Yes  Activity Tolerance  Activity Tolerance: Patient limited by pain; Patient Tolerated treatment well        Goals:  Short term goals  Time Frame for Short term goals: 1-2 weeks  Short term goal 1: Pt reports able to do HEP  3x per week (  6/22/21 met pt reports HEP BID 7/7 days)  Short term goal 2: Pt reports a decrease in NT down R UE by 25% or bettter (In progeress, part relieved in index, middle & ring remain)  Long term goals  Time Frame for Long term goals : 8-10 weeks  Long term goal 1: Pt reports neck and right shoulder pain 2/10 or less with ADLs (in progess less pain since eval on 6/22/21)  Long term goal 2: Pt reports a decrease in NT down R UE by 75% or better (6/22/21 in progress with injection to come from MD)  Long term goal 3: Demonstrates full cervical and R UE AROM with no reports of pain or NT down her arm (in progress 6/22/21 limited in R shoulder AROM with pain)  Long term goal 4: Increase neck and B UE strength to 4+/5 or > so that pt can lift overhead without pain (in progress,  improving 6/22/21, ROM and pain limited)  Long term goal 5: Pt indep with HEP (6/22/21 In Progress 2x day, light program currently )  Patient Goals   Patient goals :  To get rid of her neck and shoulder pain with NT down her R UE (6/22/21 in progress, educated chronic but should reduce)    Plan:          Cont per POC     Therapy Time   Individual Concurrent Group Co-treatment   Time In  300         Time Out  [facility], [facility]  License and [place] [address] Number: [id number]

## 2021-07-09 NOTE — PROGRESS NOTES
Physical Therapy  Daily Treatment Note  Date: 2021  Patient Name: Rodrigue Sanchez  MRN: [id number]     :   1975    Subjective:      PT Visit Information  Onset Date: 19  Total # of Visits Approved: 18  Total # of Visits to Date: 13  Plan of Care/Certification Expiration Date: 21  No Show: 0  Canceled Appointment: 1             Treatment Activities:   Manual therapy  Joint mobilization: Cervical PA mobs OA mobs grade I-II for pain releif, R facet mobilization grade I-II to dec tension and pain. Suboccipital release, Prone B scap mobs to dec tightness and impove mobility   PROM: PROM cervical spine and stretching manually to B pecs with cervical SB and Rot  pt. limited with L SB reproduced RUE symptoms thus empty end feel with pain up to 4/10 and radicular to R fingertips 3rd and 4th digit. Soft Tissue Mobalization:   MFR in prone T4 -T12 to dec tightness and pain MFR cervical paraspinals. Exercises  Exercise 7: doorway stretch 30 sec x 3   Exercise 9: Cervical retraction with extension x 10'  with towel at base of neck   Exercise 10: supine wand flexion attempted inc pain into anterior R arm and bicep   Exercise 11: supine wand shd   Exercise 12: B  ER x 10 hold 5 sec   Exercise 13: shd pulleys flexion and abd x 5 reps ea 3 sec hold has pulley system at home will put up. Manual therapy  Joint mobilization: Cervical PA mobs OA mobs grade I-II for pain releif, R facet mobilization grade I-II to dec tension and pain. Suboccipital release, Prone B scap mobs to dec tightness and impove mobility   PROM: PROM cervical spine and stretching manually to B pecs with cervical SB and Rot  pt. limited with L SB reproduced RUE symptoms thus empty end feel with pain up to 4/10 and radicular to R fingertips 3rd and 4th digit. Soft Tissue Mobalization:   MFR in prone T4 -T12 to dec tightness and pain MFR cervical paraspinals.                            Assessment: Conditions Requiring Skilled Therapeutic Intervention  Body structures, Functions, Activity limitations: Decreased functional mobility ; Decreased endurance;Decreased ROM; Increased pain;Decreased strength  Assessment: Pt. able to initiate shoulder pulleys this date to improve AROM of BUE. Limited by pain however and limited reps tolerated. Pt. still with frequent RUE symptoms and radicular pain depends on activity and how much. Pt. encouraged to take breaks and use CP PRN for pain reduction. Pain post 1.5/10. Pt. to ice at home. Treatment Diagnosis: Cervical spondylosis without myelopathy, cervical radiculopathy and Right UE pain  Prognosis: Good  REQUIRES PT FOLLOW UP: Yes  Activity Tolerance  Activity Tolerance: Patient limited by pain; Patient Tolerated treatment well      G-Code:     OutComes Score                                                     Goals:  Short term goals  Time Frame for Short term goals: 1-2 weeks  Short term goal 1: Pt reports able to do HEP  3x per week (  6/22/21 met pt reports HEP BID 7/7 days)  Short term goal 2: Pt reports a decrease in NT down R UE by 25% or bettter (In progeress, part relieved in index, middle & ring remain)  Long term goals  Time Frame for Long term goals : 8-10 weeks  Long term goal 1: Pt reports neck and right shoulder pain 2/10 or less with ADLs (in progess less pain since eval on 6/22/21)  Long term goal 2: Pt reports a decrease in NT down R UE by 75% or better (6/22/21 in progress with injection to come from MD)  Long term goal 3: Demonstrates full cervical and R UE AROM with no reports of pain or NT down her arm (in progress 6/22/21 limited in R shoulder AROM with pain)  Long term goal 4:  Increase neck and B UE strength to 4+/5 or > so that pt can lift overhead without pain (in progress,  improving 6/22/21, ROM and pain limited)  Long term goal 5: Pt indep with HEP (6/22/21 In Progress 2x day, light program currently )  Patient Goals   Patient goals : To get rid of her neck and shoulder pain with NT down her R UE (6/22/21 in progress, educated chronic but should reduce)    Plan:             Therapy Time   Individual Concurrent Group Co-treatment   Time In           Time Out           Minutes                   Kenna Marcial, CATE  License and [address] Number: [id number]

## 2021-07-12 ENCOUNTER — PATIENT MESSAGE (OUTPATIENT)
Dept: NEUROLOGY | Age: 46
End: 2021-07-12

## 2021-07-12 ENCOUNTER — TELEPHONE (OUTPATIENT)
Dept: NEUROLOGY | Age: 46
End: 2021-07-12

## 2021-07-12 DIAGNOSIS — F07.81 POST-CONCUSSION SYNDROME: ICD-10-CM

## 2021-07-12 RX ORDER — GABAPENTIN 300 MG/1
1200 CAPSULE ORAL 2 TIMES DAILY
Qty: 240 CAPSULE | Refills: 0 | Status: SHIPPED | OUTPATIENT
Start: 2021-07-12 | End: 2021-08-13 | Stop reason: SDUPTHER

## 2021-07-12 NOTE — TELEPHONE ENCOUNTER
Message sent Via [employer]     Good Morning - Is it okay to take this supplement, Focus Factor, with my Gabapentin? Is says it can help with memory, concentration and focus. Plus it's a multivitamin.      Thank you, Simi    Please Advise.       Lore Rushing

## 2021-07-12 NOTE — TELEPHONE ENCOUNTER
Odessa Malcolm, APRN - CNP 1 hour ago (1:05 PM)     Thank you! If you could also pass along this. Donald Dobson At my appt I was asked if I got nauseous from the loud noises. I usually can remove myself or use my ear plugs so I wasn't sure.      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 I had my ear plugs in plus my firing range ear muffs and after 30 minutes I just couldnt stand it anymore. I felt like I was going to throw up and had to go outside and have a family member come to monitor the worker. This happened 2 days in a row.      Also I have my prior testing report who should I email the pdf too?      Thank you very much!    Sincerely, Scott Gong

## 2021-07-13 ENCOUNTER — HOSPITAL ENCOUNTER (OUTPATIENT)
Dept: PHYSICAL THERAPY | Age: 46
Setting detail: THERAPIES SERIES
Discharge: HOME | End: 2021-07-13
Payer: COMMERCIAL

## 2021-07-13 PROCEDURE — 97140 MANUAL THERAPY 1/> REGIONS: CPT

## 2021-07-13 PROCEDURE — 97035 APP MDLTY 1+ULTRASOUND EA 15: CPT

## 2021-07-13 PROCEDURE — 97110 THERAPEUTIC EXERCISES: CPT
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
## 2021-07-16 ENCOUNTER — HOSPITAL ENCOUNTER (OUTPATIENT)
Dept: PHYSICAL THERAPY | Age: 46
Setting detail: THERAPIES SERIES
Discharge: HOME | End: 2021-07-16
Payer: COMMERCIAL

## 2021-07-16 PROCEDURE — 97110 THERAPEUTIC EXERCISES: CPT

## 2021-07-16 PROCEDURE — 97140 MANUAL THERAPY 1/> REGIONS: CPT

## 2021-07-16 ASSESSMENT — PAIN DESCRIPTION - ORIENTATION: ORIENTATION: RIGHT

## 2021-07-16 ASSESSMENT — PAIN DESCRIPTION - DESCRIPTORS: DESCRIPTORS: ACHING

## 2021-07-16 ASSESSMENT — PAIN SCALES - GENERAL: PAINLEVEL_OUTOF10: 2

## 2021-07-16 ASSESSMENT — PAIN DESCRIPTION - LOCATION: LOCATION: SHOULDER

## 2021-07-16 ASSESSMENT — PAIN DESCRIPTION - PAIN TYPE: TYPE: CHRONIC PAIN
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
## 2021-07-19 ENCOUNTER — HOSPITAL ENCOUNTER (OUTPATIENT)
Dept: PHYSICAL THERAPY | Age: 46
Setting detail: THERAPIES SERIES
Discharge: HOME | End: 2021-07-19
Payer: COMMERCIAL

## 2021-07-19 PROCEDURE — 97035 APP MDLTY 1+ULTRASOUND EA 15: CPT

## 2021-07-19 PROCEDURE — 97110 THERAPEUTIC EXERCISES: CPT

## 2021-07-19 PROCEDURE — 97140 MANUAL THERAPY 1/> REGIONS: CPT

## 2021-07-19 ASSESSMENT — PAIN DESCRIPTION - ORIENTATION: ORIENTATION: RIGHT

## 2021-07-19 ASSESSMENT — PAIN DESCRIPTION - LOCATION: LOCATION: SHOULDER

## 2021-07-19 ASSESSMENT — PAIN DESCRIPTION - PAIN TYPE: TYPE: CHRONIC PAIN

## 2021-07-19 ASSESSMENT — PAIN DESCRIPTION - DESCRIPTORS: DESCRIPTORS: ACHING;TIGHTNESS

## 2021-07-19 ASSESSMENT — PAIN SCALES - GENERAL: PAINLEVEL_OUTOF10: 2
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
## 2021-07-20 ENCOUNTER — OFFICE VISIT (OUTPATIENT)
Dept: PAIN MANAGEMENT | Age: 46
End: 2021-07-20
Payer: COMMERCIAL

## 2021-07-20 VITALS
SYSTOLIC BLOOD PRESSURE: 136 MMHG | HEIGHT: 66 IN | BODY MASS INDEX: 38.57 KG/M2 | TEMPERATURE: 98 F | DIASTOLIC BLOOD PRESSURE: 84 MMHG | WEIGHT: 240 LBS

## 2021-07-20 DIAGNOSIS — M54.12 CERVICAL RADICULOPATHY: Primary | ICD-10-CM

## 2021-07-20 DIAGNOSIS — M47.812 CERVICAL SPONDYLOSIS WITHOUT MYELOPATHY: ICD-10-CM

## 2021-07-20 PROCEDURE — 99212 OFFICE O/P EST SF 10 MIN: CPT | Performed by: ANESTHESIOLOGY

## 2021-07-20 NOTE — PROGRESS NOTES
Patient:  Lidya Meadows  YOB: 1975  Date: 7/20/2021      Subjective:     Lidya Meadows is a 40 y.o. female who presents today with:    Chief Complaint   Patient presents with    Neck Pain       HPI: The patient presents to the clinic for a follow-up. She has been doing very well after right C6 selective nerve root block on April 29 and right C7 selective nerve root block on June 23. She has a continue with her physical therapy which will gradually taper off. The patient is pleased with the outcome thus far. The patient has been advised to contact us when the pain returns. Allergies:  Nickel, Other, Oxycodone-acetaminophen, Neosporin  [bacitracin-polymyxin b], Vitamin b12, Azithromycin, and Vitamin b complex  [b complex]  Past Medical History:   Diagnosis Date    HLD (hyperlipidemia)     Migraine with aura and without status migrainosus, not intractable      Past Surgical History:   Procedure Laterality Date    HAND SURGERY Right     Middle finger trigger finger release    TONSILLECTOMY       Social History     Socioeconomic History    Marital status: Single     Spouse name: Not on file    Number of children: Not on file    Years of education: Not on file    Highest education level: Not on file   Occupational History    Not on file   Tobacco Use    Smoking status: Never Smoker    Smokeless tobacco: Current User     Types: Chew   Substance and Sexual Activity    Alcohol use:  Yes    Drug use: Never    Sexual activity: Not on file   Other Topics Concern    Not on file   Social History Narrative    Not on file     Social Determinants of Health     Financial Resource Strain: Medium Risk    Difficulty of Paying Living Expenses: Somewhat hard   Food Insecurity: No Food Insecurity    Worried About Running Out of Food in the Last Year: Never true    Karthik of Food in the Last Year: Never true   Transportation Needs: No Transportation Needs    Lack of Transportation functional for ambulation. Balance and coordinational functional. Visualized skin intact. No visualized cyanosis, pulses intact. Cranial nerves 2-12 grossly intact. No obvious upper motor neuron signs seen. Sensation intact to light touch. On physical examination there has been improvement in subjective complaints and the ranges of the motion of the upper extremities. Radiculopathy:  Negative    Studies Review:  Reviewed None. Assessment:          Cervical right C6-7 radiculopathy    Plan:     No orders of the defined types were placed in this encounter. No orders of the defined types were placed in this encounter. The patient has been advised to contact us when the pain returns. Follow up:  Return if symptoms worsen or fail to improve.     Jasmin Orr MD

## 2021-07-21 ENCOUNTER — HOSPITAL ENCOUNTER (OUTPATIENT)
Dept: PHYSICAL THERAPY | Age: 46
Setting detail: THERAPIES SERIES
Discharge: HOME | End: 2021-07-21
Payer: COMMERCIAL

## 2021-07-21 NOTE — PROGRESS NOTES
Physical Therapy  Daily Treatment Note  Date: 2021  Patient Name: Mona Francois  MRN: [id number]     :   1975    Subjective:      PT Visit Information  Onset Date: 19  PT Insurance Information: Per insurance 36 PT visits per year  Total # of Visits Approved: 18  Total # of Visits to Date: 12  Plan of Care/Certification Expiration Date: 21  No Show: 1  Canceled Appointment: 2  Subjective  Subjective: Pt called to cx recheck thsi date due ot appt conflict. General Comment  Comments: Pt recheck rescheduled for marcell 21 .            Therapy Time   Individual Concurrent Group Co-treatment   Time In           Time Out           Minutes  0 cx            Alexandra Taveras, [id number]

## 2021-07-22 ENCOUNTER — HOSPITAL ENCOUNTER (OUTPATIENT)
Dept: PHYSICAL THERAPY | Age: 46
Setting detail: THERAPIES SERIES
Discharge: HOME | End: 2021-07-22
Payer: COMMERCIAL

## 2021-07-22 PROCEDURE — 97110 THERAPEUTIC EXERCISES: CPT

## 2021-07-22 PROCEDURE — 97530 THERAPEUTIC ACTIVITIES: CPT

## 2021-07-22 PROCEDURE — 97140 MANUAL THERAPY 1/> REGIONS: CPT

## 2021-07-22 ASSESSMENT — PAIN DESCRIPTION - LOCATION: LOCATION: SHOULDER

## 2021-07-22 ASSESSMENT — PAIN DESCRIPTION - ORIENTATION: ORIENTATION: RIGHT

## 2021-07-22 ASSESSMENT — PAIN DESCRIPTION - PAIN TYPE: TYPE: CHRONIC PAIN

## 2021-07-22 ASSESSMENT — PAIN SCALES - GENERAL: PAINLEVEL_OUTOF10: 2
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
## 2021-07-23 ENCOUNTER — OFFICE VISIT (OUTPATIENT)
Dept: NEUROLOGY | Age: 46
End: 2021-07-23
Payer: COMMERCIAL

## 2021-07-23 VITALS
SYSTOLIC BLOOD PRESSURE: 104 MMHG | WEIGHT: 246.5 LBS | HEART RATE: 94 BPM | BODY MASS INDEX: 39.79 KG/M2 | DIASTOLIC BLOOD PRESSURE: 78 MMHG

## 2021-07-23 DIAGNOSIS — G56.12 MEDIAN NERVE DYSFUNCTION, LEFT: ICD-10-CM

## 2021-07-23 DIAGNOSIS — S09.90XD CLOSED HEAD INJURY, SUBSEQUENT ENCOUNTER: Primary | ICD-10-CM

## 2021-07-23 DIAGNOSIS — F07.81 POST-CONCUSSION SYNDROME: ICD-10-CM

## 2021-07-23 DIAGNOSIS — M47.812 CERVICAL SPONDYLOSIS: ICD-10-CM

## 2021-07-23 DIAGNOSIS — R41.840 CONCENTRATION DEFICIT: ICD-10-CM

## 2021-07-23 PROBLEM — S09.90XA CLOSED HEAD INJURY: Status: ACTIVE | Noted: 2021-07-23

## 2021-07-23 PROCEDURE — 99215 OFFICE O/P EST HI 40 MIN: CPT | Performed by: PSYCHIATRY & NEUROLOGY

## 2021-07-23 RX ORDER — BUSPIRONE HYDROCHLORIDE 10 MG/1
10 TABLET ORAL 2 TIMES DAILY
Qty: 60 TABLET | Refills: 3 | Status: SHIPPED | OUTPATIENT
Start: 2021-07-23 | End: 2021-08-22
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
## 2021-07-26 DIAGNOSIS — F07.81 POST-CONCUSSION SYNDROME: ICD-10-CM

## 2021-07-26 DIAGNOSIS — Z30.44 ENCOUNTER FOR SURVEILLANCE OF VAGINAL RING HORMONAL CONTRACEPTIVE DEVICE: ICD-10-CM

## 2021-07-26 RX ORDER — GABAPENTIN 300 MG/1
1200 CAPSULE ORAL 2 TIMES DAILY
Qty: 240 CAPSULE | Refills: 0 | OUTPATIENT
Start: 2021-07-26 | End: 2021-08-25

## 2021-07-26 RX ORDER — ETONOGESTREL AND ETHINYL ESTRADIOL 11.7; 2.7 MG/1; MG/1
INSERT, EXTENDED RELEASE VAGINAL
Qty: 3 EACH | Refills: 3 | Status: SHIPPED | OUTPATIENT
Start: 2021-07-26 | End: 2021-09-09 | Stop reason: SDUPTHER

## 2021-07-27 ENCOUNTER — OFFICE VISIT (OUTPATIENT)
Dept: FAMILY MEDICINE CLINIC | Age: 46
End: 2021-07-27
Payer: COMMERCIAL

## 2021-07-27 ENCOUNTER — HOSPITAL ENCOUNTER (OUTPATIENT)
Dept: PHYSICAL THERAPY | Age: 46
Setting detail: THERAPIES SERIES
Discharge: HOME | End: 2021-07-27
Payer: COMMERCIAL

## 2021-07-27 VITALS
DIASTOLIC BLOOD PRESSURE: 72 MMHG | SYSTOLIC BLOOD PRESSURE: 114 MMHG | OXYGEN SATURATION: 98 % | BODY MASS INDEX: 39.7 KG/M2 | WEIGHT: 247 LBS | HEIGHT: 66 IN | RESPIRATION RATE: 11 BRPM | HEART RATE: 90 BPM | TEMPERATURE: 97.7 F

## 2021-07-27 DIAGNOSIS — Z12.4 CERVICAL CANCER SCREENING: ICD-10-CM

## 2021-07-27 DIAGNOSIS — Z00.00 PREVENTATIVE HEALTH CARE: Primary | ICD-10-CM

## 2021-07-27 PROCEDURE — 97530 THERAPEUTIC ACTIVITIES: CPT

## 2021-07-27 PROCEDURE — 97110 THERAPEUTIC EXERCISES: CPT

## 2021-07-27 PROCEDURE — 99396 PREV VISIT EST AGE 40-64: CPT | Performed by: FAMILY MEDICINE

## 2021-07-27 PROCEDURE — 97140 MANUAL THERAPY 1/> REGIONS: CPT

## 2021-07-27 PROCEDURE — 97035 APP MDLTY 1+ULTRASOUND EA 15: CPT

## 2021-07-27 ASSESSMENT — PAIN DESCRIPTION - PAIN TYPE: TYPE: CHRONIC PAIN

## 2021-07-27 ASSESSMENT — PAIN DESCRIPTION - ORIENTATION: ORIENTATION: RIGHT

## 2021-07-27 ASSESSMENT — PAIN SCALES - GENERAL: PAINLEVEL_OUTOF10: 1

## 2021-07-27 ASSESSMENT — PAIN DESCRIPTION - LOCATION: LOCATION: SHOULDER

## 2021-07-27 ASSESSMENT — PAIN DESCRIPTION - DESCRIPTORS: DESCRIPTORS: ACHING
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
## 2021-07-28 ENCOUNTER — TELEPHONE (OUTPATIENT)
Dept: NEUROLOGY | Age: 46
End: 2021-07-28

## 2021-07-28 NOTE — TELEPHONE ENCOUNTER
Pt is being sent a copy of medication contract through my chart and will be sending it back to us signed.

## 2021-07-28 NOTE — PROGRESS NOTES
Chief Complaint   Patient presents with    Gynecologic Exam     LMP 7/17/21        HPI: Case Mckoy 40 y.o. female presenting for     Patient is here for her gynecological exam  Patient was her last menstrual period was July 17, 2021. No issues or concerns. Patient does have a NuvaRing. Patient denies any fevers, chills, nausea, vomiting, chest pain, shortness of breath, abdominal pain, urination, change in stools. Patient admits to regular menstrual cycles. Does not want to be tested for any sexually transmitted diseases. Current Outpatient Medications   Medication Sig Dispense Refill    etonogestrel-ethinyl estradiol (NUVARING) 0.12-0.015 MG/24HR vaginal ring Vaginal: One ring, inserted vaginally and left in place continuously for 3 consecutive weeks, then removed for 1 week. A new ring is inserted 7 days after the last was removed 3 each 3    busPIRone (BUSPAR) 10 MG tablet Take 1 tablet by mouth 2 times daily 60 tablet 3    gabapentin (NEURONTIN) 300 MG capsule Take 4 capsules by mouth 2 times daily for 30 days. 240 capsule 0    methocarbamol (ROBAXIN) 750 MG tablet Take 1 tablet by mouth daily 30 tablet 1    cyclobenzaprine (FLEXERIL) 10 MG tablet take 1/2 - 1 tablet 2-3 times a day       No current facility-administered medications for this visit. ROS  CONSTITUTIONAL: The patient denies fevers, chills, sweats and body ache. HEENT: Denies headache, blurry vision, eye pain, tinnitus, vertigo,  sore throat, neck or thyroid masses. RESPIRATORY: Denies cough, sputum, hemoptysis. CARDIAC: Denies chest pain, pressure, palpitations, Denies lower extremity edema. GASTROINTESTINAL: Denies abdominal pain, constipation, diarrhea, bleeding in the stools,   GENITOURINARY: Denies dysuria, hematuria, nocturia or frequency, urinary incontinence. NEUROLOGIC: Denies headaches, dizziness, syncope, weakness  MUSCULOSKELETAL: Chronic pain in the shoulders, neck, back, hip, hands.   ENDOCRINOLOGY: Club or Organization Meetings:     Marital Status:    Intimate Partner Violence:     Fear of Current or Ex-Partner:     Emotionally Abused:     Physically Abused:     Sexually Abused:         /72   Pulse 90   Temp 97.7 °F (36.5 °C)   Resp 11   Ht 5' 6\" (1.676 m)   Wt 247 lb (112 kg)   SpO2 98%   BMI 39.87 kg/m²        Physical Exam:    General appearance - alert, well appearing, and in no distress, morbidly obese  Mental Status - alert, oriented to person, place, and time  Eyes - pupils equal and reactive, extraocular eye movements intact   Ears - bilateral TM's and external ear canals normal   Nose - normal and patent, no erythema, discharge or polyps   Sinuses - Normal paranasal sinuses without tenderness   Throat - mucous membranes moist, pharynx normal without lesions   Neck - supple, no significant adenopathy   Thyroid - thyroid is normal in size without nodules or tenderness    Chest - clear to auscultation, no wheezes, rales or rhonchi, symmetric air entry   Heart - normal rate, regular rhythm, normal S1, S2, no murmurs, rubs, clicks or gallops  Abdomen - soft, nontender, nondistended, no masses or organomegaly   Back exam -limited range of motion. Patient wearing a back brace. Tenderness of patient the paraspinal thoracic region. Patient unable to do range of motion at this time. Neurological - alert, oriented, normal speech, no focal findings or movement disorder noted   Musculoskeletal - no joint tenderness, deformity or swelling   Extremities - peripheral pulses normal, no pedal edema, no clubbing or cyanosis   Skin - normal coloration and turgor, no rashes, no suspicious skin lesions noted  Pelvic Examination- mild discharged noted on examination. No bleeding. Cervical os identified. PAP collected.      Labs   No results found for: TSHREFLEX  No results found for: TSH    No results found for: NA, K, CL, CO2, BUN, CREATININE, GLUCOSE, CALCIUM, PROT, LABALBU, BILITOT, ALKPHOS, AST, ALT, LABGLOM, GFRAA, AGRATIO, GLOB      No results found for: WBC, HGB, HCT, MCV, PLT  No results found for: LABA1C  No results found for: EAG        A/P: Caes Shark 40 y.o. female presenting for     1. Preventative health care      2. Cervical cancer screening    - Pap Smear; Future        Please note, this report has been partially produced using speech recognition software  and may cause  and /or contain errors related to that system including grammar, punctuation and spelling as well as words and phrases that may seem inappropriate. If there are questions or concerns please feel free to contact me to clarify.

## 2021-07-30 NOTE — PROGRESS NOTES
[facility]) Physicians  Neurosurgery and Pain [address] [facility]: [phone]  F: [phone]             Provider: Taryn Saenz MD          Patient Name: Hollie Hendricks : 1975        Date: 2021         PROCEDURE:  Right C7 Selective Nerve Root Block      Description of Procedure: The procedure and potential complications were explained to the patient and a voluntary informed, signed consent was obtained. The patient was placed supine on the x-ray table and the right side of the neck was prepped with Betadine solution. Under fluoroscopic guidance with the image intensifier rotated toward the right to view the C6-7 intervertebral foramen at its maximum diameter, the skin was prepped with Betadine solution and a 25-gauge, 3.5 inch long curved spinal needle was inserted aiming at the posterior wall in the lower third of the foramen. The posterior wall was the articular process and as soon as the needle tip was encountered by the periosteum the image intensifier was rotated to the AP view and then adjusted the needle tip. 0.5 mL of Isovue-300 was injected for a neurogram and then a mixture of 10 mg of Decadron and 1 mL of normal saline and 0.5 mL 1% Xylocaine was injected. The patient tolerated the procedure very well. The patient was discharged home in stable condition. Summary and Recommendations: We will see her in the office for follow up.
room air

## 2021-08-02 LAB
HPV COMMENT: NORMAL
HPV TYPE 16: NOT DETECTED
HPV TYPE 18: NOT DETECTED
HPVOH (OTHER TYPES): NOT DETECTED

## 2021-08-03 ENCOUNTER — HOSPITAL ENCOUNTER (OUTPATIENT)
Dept: PHYSICAL THERAPY | Age: 46
Setting detail: THERAPIES SERIES
Discharge: HOME | End: 2021-08-03
Payer: COMMERCIAL

## 2021-08-03 PROCEDURE — 97035 APP MDLTY 1+ULTRASOUND EA 15: CPT

## 2021-08-03 PROCEDURE — 97140 MANUAL THERAPY 1/> REGIONS: CPT

## 2021-08-03 PROCEDURE — 97110 THERAPEUTIC EXERCISES: CPT

## 2021-08-03 ASSESSMENT — PAIN DESCRIPTION - ORIENTATION: ORIENTATION: RIGHT

## 2021-08-03 ASSESSMENT — PAIN DESCRIPTION - LOCATION: LOCATION: SHOULDER;ARM

## 2021-08-03 ASSESSMENT — PAIN DESCRIPTION - DESCRIPTORS: DESCRIPTORS: ACHING;TIGHTNESS

## 2021-08-03 ASSESSMENT — PAIN SCALES - GENERAL: PAINLEVEL_OUTOF10: 2

## 2021-08-03 ASSESSMENT — PAIN DESCRIPTION - PAIN TYPE: TYPE: CHRONIC PAIN
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
## 2021-08-10 ENCOUNTER — HOSPITAL ENCOUNTER (OUTPATIENT)
Dept: PHYSICAL THERAPY | Age: 46
Setting detail: THERAPIES SERIES
Discharge: HOME | End: 2021-08-10
Payer: COMMERCIAL

## 2021-08-10 PROCEDURE — 97140 MANUAL THERAPY 1/> REGIONS: CPT

## 2021-08-10 PROCEDURE — G0283 ELEC STIM OTHER THAN WOUND: HCPCS

## 2021-08-10 PROCEDURE — 97110 THERAPEUTIC EXERCISES: CPT

## 2021-08-10 ASSESSMENT — PAIN DESCRIPTION - DESCRIPTORS: DESCRIPTORS: ACHING;TIGHTNESS

## 2021-08-10 ASSESSMENT — PAIN DESCRIPTION - LOCATION: LOCATION: BACK

## 2021-08-10 ASSESSMENT — PAIN DESCRIPTION - PAIN TYPE: TYPE: CHRONIC PAIN

## 2021-08-10 ASSESSMENT — PAIN SCALES - GENERAL: PAINLEVEL_OUTOF10: 4
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
## 2021-08-17 ENCOUNTER — HOSPITAL ENCOUNTER (OUTPATIENT)
Dept: PHYSICAL THERAPY | Age: 46
Setting detail: THERAPIES SERIES
Discharge: HOME | End: 2021-08-17
Payer: COMMERCIAL
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
## 2021-08-24 ENCOUNTER — HOSPITAL ENCOUNTER (OUTPATIENT)
Dept: PHYSICAL THERAPY | Age: 46
Setting detail: THERAPIES SERIES
Discharge: HOME | End: 2021-08-24
Payer: COMMERCIAL

## 2021-08-24 PROCEDURE — 97530 THERAPEUTIC ACTIVITIES: CPT

## 2021-08-24 PROCEDURE — 97140 MANUAL THERAPY 1/> REGIONS: CPT

## 2021-08-24 PROCEDURE — 97035 APP MDLTY 1+ULTRASOUND EA 15: CPT

## 2021-08-24 PROCEDURE — 97110 THERAPEUTIC EXERCISES: CPT

## 2021-08-24 ASSESSMENT — PAIN DESCRIPTION - DESCRIPTORS: DESCRIPTORS: ACHING;TIGHTNESS

## 2021-08-24 ASSESSMENT — PAIN DESCRIPTION - ORIENTATION: ORIENTATION: RIGHT

## 2021-08-24 ASSESSMENT — PAIN DESCRIPTION - PAIN TYPE: TYPE: CHRONIC PAIN

## 2021-08-24 ASSESSMENT — PAIN SCALES - GENERAL: PAINLEVEL_OUTOF10: 2

## 2021-08-24 ASSESSMENT — PAIN DESCRIPTION - FREQUENCY: FREQUENCY: CONTINUOUS

## 2021-08-24 ASSESSMENT — PAIN DESCRIPTION - LOCATION: LOCATION: BACK;SHOULDER
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
## 2021-08-30 RX ORDER — BUSPIRONE HYDROCHLORIDE 5 MG/1
5 TABLET ORAL 2 TIMES DAILY
Qty: 90 TABLET | Refills: 0 | COMMUNITY
Start: 2021-08-30 | End: 2021-09-09 | Stop reason: SDUPTHER

## 2021-08-30 RX ORDER — AMOXICILLIN AND CLAVULANATE POTASSIUM 875; 125 MG/1; MG/1
1 TABLET, FILM COATED ORAL EVERY 12 HOURS
Qty: 20 TABLET | Refills: 0 | Status: SHIPPED | OUTPATIENT
Start: 2021-08-30 | End: 2021-09-09

## 2021-09-09 DIAGNOSIS — Z30.44 ENCOUNTER FOR SURVEILLANCE OF VAGINAL RING HORMONAL CONTRACEPTIVE DEVICE: ICD-10-CM

## 2021-09-09 RX ORDER — CYCLOBENZAPRINE HCL 10 MG
10 TABLET ORAL 3 TIMES DAILY PRN
Qty: 90 TABLET | Refills: 3 | Status: SHIPPED | OUTPATIENT
Start: 2021-09-09 | End: 2021-10-09

## 2021-09-09 RX ORDER — ETONOGESTREL AND ETHINYL ESTRADIOL 11.7; 2.7 MG/1; MG/1
INSERT, EXTENDED RELEASE VAGINAL
Qty: 3 EACH | Refills: 3 | Status: SHIPPED | OUTPATIENT
Start: 2021-09-09 | End: 2021-10-17 | Stop reason: SDUPTHER

## 2021-09-09 RX ORDER — BUSPIRONE HYDROCHLORIDE 5 MG/1
5 TABLET ORAL 2 TIMES DAILY
Qty: 60 TABLET | Refills: 2 | Status: SHIPPED | OUTPATIENT
Start: 2021-09-09 | End: 2021-11-05 | Stop reason: SDUPTHER

## 2021-10-17 DIAGNOSIS — Z30.44 ENCOUNTER FOR SURVEILLANCE OF VAGINAL RING HORMONAL CONTRACEPTIVE DEVICE: ICD-10-CM

## 2021-10-18 RX ORDER — ETONOGESTREL AND ETHINYL ESTRADIOL 11.7; 2.7 MG/1; MG/1
INSERT, EXTENDED RELEASE VAGINAL
Qty: 3 EACH | Refills: 3 | Status: SHIPPED | OUTPATIENT
Start: 2021-10-18 | End: 2021-12-05 | Stop reason: SDUPTHER

## 2021-10-18 NOTE — TELEPHONE ENCOUNTER
Future Appointments    1/27/2022 Skinny Serna MD   Department: [facility]   Appt Notes: 6 month follow up    Recent Visits    07/27/2021 Preventative health care   [facility] Skinny Serna MD

## 2021-10-22 ENCOUNTER — OFFICE VISIT (OUTPATIENT)
Dept: PAIN MANAGEMENT | Age: 46
End: 2021-10-22
Payer: COMMERCIAL

## 2021-10-22 VITALS — BODY MASS INDEX: 39.37 KG/M2 | WEIGHT: 245 LBS | HEIGHT: 66 IN | TEMPERATURE: 98.6 F

## 2021-10-22 DIAGNOSIS — M54.12 CERVICAL RADICULOPATHY: Primary | ICD-10-CM

## 2021-10-22 DIAGNOSIS — M47.812 CERVICAL SPONDYLOSIS WITHOUT MYELOPATHY: ICD-10-CM

## 2021-10-22 PROCEDURE — 99213 OFFICE O/P EST LOW 20 MIN: CPT | Performed by: ANESTHESIOLOGY

## 2021-10-22 ASSESSMENT — ENCOUNTER SYMPTOMS
DIARRHEA: 0
SHORTNESS OF BREATH: 0
NAUSEA: 0
CONSTIPATION: 0
BACK PAIN: 0
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
## 2021-11-03 ENCOUNTER — TELEPHONE (OUTPATIENT)
Dept: PAIN MANAGEMENT | Age: 46
End: 2021-11-03

## 2021-11-03 DIAGNOSIS — M54.12 CERVICAL RADICULOPATHY: Primary | ICD-10-CM

## 2021-11-03 NOTE — TELEPHONE ENCOUNTER
PER [facility] PT,  PT. IS REQUESTING ORDER FOR OT ON HER RIGHT HAND TO HELP WITH FINE MOTOR SKILLS. PLEASE SEE ATTACHED REQUEST FROM [id number]. WILL DR. LONG AUTHORIZ OT ORDER THIS PATIENT FOR RIGHT HAND?

## 2021-11-05 ENCOUNTER — TELEPHONE (OUTPATIENT)
Dept: PAIN MANAGEMENT | Age: 46
End: 2021-11-05

## 2021-11-05 RX ORDER — BUSPIRONE HYDROCHLORIDE 5 MG/1
5 TABLET ORAL 2 TIMES DAILY
Qty: 60 TABLET | Refills: 2 | Status: SHIPPED | OUTPATIENT
Start: 2021-11-05 | End: 2021-11-11 | Stop reason: SDUPTHER

## 2021-11-05 NOTE — TELEPHONE ENCOUNTER
BENEFITS: RT C4,5,6 RFA    Insurance: MMO  Phone: [phone]  Contact Name: Farhat Franco  Effective Date: 3.1.2021     Plan year: YES-CALENDAR  Deductible: 300.00      Deductible Met: 300.00  Allowed/benefits paid at: 70% AFTER DEDUCTIBLE  OOP: 700.00 MET $700.00  Freq Limits: 33353 & 61564--7  DAYS  Prior Auth Requirement: NO    Notes: NO PRE-EX CLAUSE    Call Reference #: [id number]    Time of call: 9:20AM

## 2021-11-09 DIAGNOSIS — M47.812 CERVICAL SPONDYLOSIS WITHOUT MYELOPATHY: ICD-10-CM

## 2021-11-09 DIAGNOSIS — M54.12 CERVICAL RADICULOPATHY: Primary | ICD-10-CM

## 2021-11-09 RX ORDER — DIAZEPAM 5 MG/1
10 TABLET ORAL DAILY
Qty: 2 TABLET | Refills: 0 | Status: SHIPPED | OUTPATIENT
Start: 2021-11-09 | End: 2021-11-10

## 2021-11-10 ENCOUNTER — TELEPHONE (OUTPATIENT)
Dept: PAIN MANAGEMENT | Age: 46
End: 2021-11-10

## 2021-11-10 NOTE — TELEPHONE ENCOUNTER
Left voicemail for the patient to contact our office. Need to reschedule procedure from 11/10/21. ARTIE. L3 4 5 RADIOFREQUENCY ABLATION with Dr. Kimberlee Bagn.  45 minute procedure Do not double book.

## 2021-11-11 ENCOUNTER — OFFICE VISIT (OUTPATIENT)
Dept: NEUROLOGY | Age: 46
End: 2021-11-11
Payer: COMMERCIAL

## 2021-11-11 VITALS
HEART RATE: 117 BPM | SYSTOLIC BLOOD PRESSURE: 132 MMHG | BODY MASS INDEX: 40.35 KG/M2 | WEIGHT: 250 LBS | DIASTOLIC BLOOD PRESSURE: 86 MMHG

## 2021-11-11 DIAGNOSIS — S09.90XD CLOSED HEAD INJURY, SUBSEQUENT ENCOUNTER: ICD-10-CM

## 2021-11-11 DIAGNOSIS — M47.812 CERVICAL SPONDYLOSIS: ICD-10-CM

## 2021-11-11 DIAGNOSIS — G56.12 MEDIAN NERVE DYSFUNCTION, LEFT: ICD-10-CM

## 2021-11-11 DIAGNOSIS — F07.81 POST-CONCUSSION SYNDROME: Primary | ICD-10-CM

## 2021-11-11 DIAGNOSIS — R41.840 CONCENTRATION DEFICIT: ICD-10-CM

## 2021-11-11 PROCEDURE — 99214 OFFICE O/P EST MOD 30 MIN: CPT | Performed by: PSYCHIATRY & NEUROLOGY

## 2021-11-11 RX ORDER — GABAPENTIN 300 MG/1
1200 CAPSULE ORAL 2 TIMES DAILY
Qty: 240 CAPSULE | Refills: 0 | Status: SHIPPED | OUTPATIENT
Start: 2021-11-11 | End: 2021-12-19 | Stop reason: SDUPTHER

## 2021-11-11 RX ORDER — METHYLPHENIDATE HYDROCHLORIDE 10 MG/1
10 TABLET ORAL DAILY
Qty: 30 TABLET | Refills: 0 | Status: SHIPPED | OUTPATIENT
Start: 2021-11-11 | End: 2021-12-05 | Stop reason: SDUPTHER

## 2021-11-11 RX ORDER — BUSPIRONE HYDROCHLORIDE 5 MG/1
5 TABLET ORAL 2 TIMES DAILY
Qty: 60 TABLET | Refills: 2 | Status: SHIPPED | OUTPATIENT
Start: 2021-11-11 | End: 2022-01-17 | Stop reason: SDUPTHER

## 2021-11-11 RX ORDER — CYCLOBENZAPRINE HCL 10 MG
10 TABLET ORAL 2 TIMES DAILY PRN
COMMUNITY

## 2021-11-11 ASSESSMENT — ENCOUNTER SYMPTOMS
NAUSEA: 0
TROUBLE SWALLOWING: 0
CHOKING: 0
VOMITING: 0
SHORTNESS OF BREATH: 0
BACK PAIN: 0
PHOTOPHOBIA: 0
COLOR CHANGE: 0

## 2021-11-11 NOTE — PROGRESS NOTES
Subjective:      Patient ID: Sonido Vieira is a 40 y.o. female who presents today for:  Chief Complaint   Patient presents with    Follow-up     Pt states that things are ok, she says that she started working again and has been having issues with the florecent lighting. She it caused the room to spin and started vomitting. She says fast talking makes her head spin. She says noises as well causes her probelms. She says when theses things happened her head just feels like it is spining and she is just super sensitive to everything. She says she still has to leave notes around her house due to the memory issues.  Other     She says that stress causes her to to not beable to focus and have the other spining feeling. She says she never got the call to do the testing for her left arm either. HPI 40 right-handed female with a history of closed head injury with cervical spondylosis postconcussion syndrome and concentration deficit. Patient had a brain injury in 2019. Initially she had pseudobulbar affect which are gotten better.  strengths are improved over time. Still has not had an EMG. Results she has multiple questions that we have gone through in some detail. Her main issues appears to be concentration. She tried to go to work full-time and developed significant headaches. This may be migrainous headaches. She is not able to complete her tasks. She still has some memory issues as well. The main issues appears to be that light bothers her and she cannot work under fluorescent lighting.   She denies any tinnitus or hearing but she is hyperacutic    Past Medical History:   Diagnosis Date    HLD (hyperlipidemia)     Migraine with aura and without status migrainosus, not intractable      Past Surgical History:   Procedure Laterality Date    HAND SURGERY Right     Middle finger trigger finger release    TONSILLECTOMY       Social History     Socioeconomic History    Marital status: Single Spouse name: Not on file    Number of children: Not on file    Years of education: Not on file    Highest education level: Not on file   Occupational History    Not on file   Tobacco Use    Smoking status: Never Smoker    Smokeless tobacco: Current User     Types: Chew   Substance and Sexual Activity    Alcohol use: Yes    Drug use: Never    Sexual activity: Not on file   Other Topics Concern    Not on file   Social History Narrative    Not on file     Social Determinants of Health     Financial Resource Strain: Medium Risk    Difficulty of Paying Living Expenses: Somewhat hard   Food Insecurity: No Food Insecurity    Worried About Running Out of Food in the Last Year: Never true    Karthik of Food in the Last Year: Never true   Transportation Needs: No Transportation Needs    Lack of Transportation (Medical): No    Lack of Transportation (Non-Medical):  No   Physical Activity:     Days of Exercise per Week: Not on file    Minutes of Exercise per Session: Not on file   Stress:     Feeling of Stress : Not on file   Social Connections:     Frequency of Communication with Friends and Family: Not on file    Frequency of Social Gatherings with Friends and Family: Not on file    Attends Mu-ism Services: Not on file    Active Member of [address] [place] [address] [employer] or Organizations: Not on file    Attends Club or Organization Meetings: Not on file    Marital Status: Not on file   Intimate Partner Violence:     Fear of Current or Ex-Partner: Not on file    Emotionally Abused: Not on file    Physically Abused: Not on file    Sexually Abused: Not on file   Housing Stability:     Unable to Pay for Housing in the Last Year: Not on file    Number of [place] in the Last Year: Not on file    Unstable Housing in the Last Year: Not on file     Family History   Problem Relation Age of Onset    No Known Problems Mother     Diabetes Father     Hypertension Father      Allergies   Allergen Reactions    Nickel Hives and Itching    Other      Other reaction(s): Dizziness    Oxycodone-Acetaminophen Other (See Comments)     nausea      Neosporin  [Bacitracin-Polymyxin B] Hives    Vitamin B12 Hives    Azithromycin Hives    Vitamin B Complex  [B Complex-B12] Hives       Current Outpatient Medications   Medication Sig Dispense Refill    cyclobenzaprine (FLEXERIL) 10 MG tablet Take 10 mg by mouth 2 times daily as needed for Muscle spasms       busPIRone (BUSPAR) 5 MG tablet Take 1 tablet by mouth 2 times daily 60 tablet 2    gabapentin (NEURONTIN) 300 MG capsule Take 4 capsules by mouth 2 times daily for 30 days. 240 capsule 0    methylphenidate (RITALIN) 10 MG tablet Take 1 tablet by mouth daily for 30 days. 30 tablet 0    etonogestrel-ethinyl estradiol (NUVARING) 0.12-0.015 MG/24HR vaginal ring Vaginal: One ring, inserted vaginally and left in place continuously for 3 consecutive weeks, then removed for 1 week. A new ring is inserted 7 days after the last was removed 3 each 3     No current facility-administered medications for this visit. Review of Systems   Constitutional: Negative for fever. HENT: Negative for ear pain, tinnitus and trouble swallowing. Eyes: Negative for photophobia and visual disturbance. Respiratory: Negative for choking and shortness of breath. Cardiovascular: Negative for chest pain and palpitations. Gastrointestinal: Negative for nausea and vomiting. Musculoskeletal: Negative for back pain, gait problem, joint swelling, myalgias, neck pain and neck stiffness. Skin: Negative for color change. Allergic/Immunologic: Negative for food allergies. Neurological: Positive for headaches. Negative for dizziness, tremors, seizures, syncope, facial asymmetry, speech difficulty, weakness, light-headedness and numbness. Psychiatric/Behavioral: Negative for behavioral problems, confusion, hallucinations and sleep disturbance.        Objective:   /86 (Site: Left Upper Arm, Position: Sitting, Cuff Size: Medium Adult)   Pulse 117   Wt 250 lb (113.4 kg)   BMI 40.35 kg/m²     Physical Exam  Vitals reviewed. Eyes:      Pupils: Pupils are equal, round, and reactive to light. Cardiovascular:      Rate and Rhythm: Normal rate and regular rhythm. Heart sounds: No murmur heard. Pulmonary:      Effort: Pulmonary effort is normal.      Breath sounds: Normal breath sounds. Abdominal:      General: Bowel sounds are normal.   Musculoskeletal:         General: Normal range of motion. Cervical back: Normal range of motion. Skin:     General: Skin is warm. Neurological:      Mental Status: She is alert and oriented to person, place, and time. Cranial Nerves: No cranial nerve deficit. Sensory: No sensory deficit. Motor: No abnormal muscle tone. Coordination: Coordination normal.      Deep Tendon Reflexes: Reflexes are normal and symmetric. Babinski sign absent on the right side. Babinski sign absent on the left side. Psychiatric:         Mood and Affect: Mood normal.         No results found. No results found for: WBC, RBC, HGB, HCT, MCV, MCH, MCHC, RDW, PLT, MPV  No results found for: NA, K, CL, CO2, BUN, CREATININE, GFRAA, AGRATIO, LABGLOM, GLUCOSE, PROT, LABALBU, CALCIUM, BILITOT, ALKPHOS, AST, ALT  No results found for: PROTIME, INR  No results found for: TSH, BQSFUTEX37, FOLATE, FERRITIN, IRON, TIBC, PTRFSAT, TSH, FREET4  No results found for: TRIG, HDL, LDLCALC, LDLDIRECT, LABVLDL  No results found for: LABAMPH, BARBSCNU, LABBENZ, CANNAB, COCAINESCRN, LABMETH, OPIATESCREENURINE, PHENCYCLIDINESCREENURINE, PPXUR, ETOH  No results found for: LITHIUM, DILFRTOT, VALPROATE    Assessment:       Diagnosis Orders   1. Post-concussion syndrome  gabapentin (NEURONTIN) 300 MG capsule    methylphenidate (RITALIN) 10 MG tablet   2. Median nerve dysfunction, left     3. Concentration deficit     4. Closed head injury, subsequent encounter     5.  Cervical spondylosis     Postconcussive syndrome with multiple symptoms related to the same. Patient is still having some issues with concentration and memory. Recommend a trial of a low-dose of Ritalin. Her main issues appears to be \"postconcussional headaches which may be migrainous. We had offered her CGRP blockers which she would rather do migraine leave a over-the-counter medication had which is reasonable for now. Neck soft she is not able to return back to work full-time and has issues with fluorescent lights I recommended she try some of the classes. Patient also may require repeat neuropsychometric testing see what the damage is here as she would like to apply for disability. We will keep her on this medication for a while and I recommend that she obtain her EMG as well. Plan:      No orders of the defined types were placed in this encounter. Orders Placed This Encounter   Medications    busPIRone (BUSPAR) 5 MG tablet     Sig: Take 1 tablet by mouth 2 times daily     Dispense:  60 tablet     Refill:  2    gabapentin (NEURONTIN) 300 MG capsule     Sig: Take 4 capsules by mouth 2 times daily for 30 days. Dispense:  240 capsule     Refill:  0    methylphenidate (RITALIN) 10 MG tablet     Sig: Take 1 tablet by mouth daily for 30 days. Dispense:  30 tablet     Refill:  0       No follow-ups on file.       Magalis Pereira MD

## 2021-11-24 ENCOUNTER — OFFICE VISIT (OUTPATIENT)
Dept: PAIN MANAGEMENT | Age: 46
End: 2021-11-24
Payer: COMMERCIAL

## 2021-11-24 DIAGNOSIS — M47.812 CERVICAL SPONDYLOSIS WITHOUT MYELOPATHY: Primary | ICD-10-CM

## 2021-11-24 DIAGNOSIS — M54.12 CERVICAL RADICULOPATHY: ICD-10-CM

## 2021-11-24 PROCEDURE — 64634 DESTROY C/TH FACET JNT ADDL: CPT | Performed by: ANESTHESIOLOGY

## 2021-11-24 PROCEDURE — 64633 DESTROY CERV/THOR FACET JNT: CPT | Performed by: ANESTHESIOLOGY

## 2021-11-24 RX ORDER — LIDOCAINE HYDROCHLORIDE 10 MG/ML
9 INJECTION, SOLUTION EPIDURAL; INFILTRATION; INTRACAUDAL; PERINEURAL ONCE
Status: COMPLETED | OUTPATIENT
Start: 2021-11-24 | End: 2021-11-24

## 2021-11-24 RX ORDER — BETAMETHASONE SODIUM PHOSPHATE AND BETAMETHASONE ACETATE 3; 3 MG/ML; MG/ML
3 INJECTION, SUSPENSION INTRA-ARTICULAR; INTRALESIONAL; INTRAMUSCULAR; SOFT TISSUE ONCE
Status: COMPLETED | OUTPATIENT
Start: 2021-11-24 | End: 2021-11-24

## 2021-11-24 RX ADMIN — BETAMETHASONE SODIUM PHOSPHATE AND BETAMETHASONE ACETATE 3 MG: 3; 3 INJECTION, SUSPENSION INTRA-ARTICULAR; INTRALESIONAL; INTRAMUSCULAR; SOFT TISSUE at 13:25

## 2021-11-24 RX ADMIN — Medication 1 MEQ: at 13:25

## 2021-11-24 RX ADMIN — LIDOCAINE HYDROCHLORIDE 9 ML: 10 INJECTION, SOLUTION EPIDURAL; INFILTRATION; INTRACAUDAL; PERINEURAL at 13:26

## 2021-11-24 NOTE — PROGRESS NOTES
[facility]) Physicians  [facility] [place]  [address], [address] [facility] [address], [address] [facility], [facility]: [phone]  F: [phone]             Provider: Jacob Flores MD          Patient Name: Marty Harris : 1975        Date: 2021         PROCEDURE: Right C4, C5 and C6 medial branch ablation by radiofrequency. Description of Procedure: The procedure and potential complications were explained to the patient and a voluntary informed, signed consent was obtained. The patient was placed in left decubitus position and the right side of the neck was prepped with Betadine solution. Under fluoroscopic guidance the skin was infiltrated with 1% Xylocaine and a 20-gauge, 3.5 inch long cannula with a 10 mm active curved tip was inserted to place the cannula tip on the medial branches located on the respective articular pillar. After sensory and motor tests were done 2 mL of 1% Xylocaine was injected. Ablation was carried out at 80 degrees Celsius for 60 seconds and it was repeated one more time by repositioning the cannula tip.  1 mg of Celestone was injected at each level before the removal of the cannula. The patient tolerated the procedure very well. Home-going instructions were given. Summary and Recommendations: We will see the patient in the office for a follow-up.

## 2021-12-05 DIAGNOSIS — Z30.44 ENCOUNTER FOR SURVEILLANCE OF VAGINAL RING HORMONAL CONTRACEPTIVE DEVICE: ICD-10-CM

## 2021-12-06 RX ORDER — ETONOGESTREL AND ETHINYL ESTRADIOL 11.7; 2.7 MG/1; MG/1
INSERT, EXTENDED RELEASE VAGINAL
Qty: 3 EACH | Refills: 3 | Status: SHIPPED | OUTPATIENT
Start: 2021-12-06 | End: 2022-01-17 | Stop reason: SDUPTHER

## 2021-12-07 ENCOUNTER — TELEPHONE (OUTPATIENT)
Dept: NEUROLOGY | Age: 46
End: 2021-12-07

## 2021-12-07 NOTE — TELEPHONE ENCOUNTER
lmovm advising patient that [facility] did not take her insurance for neuro psych testing and that I faxed a new order to [facility] which does take her insurance ( nurse from [employer] called and stated that [facility] does take her insurance )

## 2021-12-17 ENCOUNTER — TELEPHONE (OUTPATIENT)
Dept: NEUROLOGY | Age: 46
End: 2021-12-17

## 2021-12-17 ENCOUNTER — PATIENT MESSAGE (OUTPATIENT)
Dept: NEUROLOGY | Age: 46
End: 2021-12-17

## 2021-12-20 DIAGNOSIS — F07.81 POST-CONCUSSION SYNDROME: ICD-10-CM

## 2021-12-20 RX ORDER — METHYLPHENIDATE HYDROCHLORIDE 10 MG/1
TABLET ORAL
Qty: 60 TABLET | Refills: 0 | Status: SHIPPED | OUTPATIENT
Start: 2021-12-20 | End: 2022-01-01 | Stop reason: SDUPTHER

## 2021-12-21 ENCOUNTER — OFFICE VISIT (OUTPATIENT)
Dept: PAIN MANAGEMENT | Age: 46
End: 2021-12-21
Payer: COMMERCIAL

## 2021-12-21 VITALS
SYSTOLIC BLOOD PRESSURE: 143 MMHG | TEMPERATURE: 97.2 F | DIASTOLIC BLOOD PRESSURE: 81 MMHG | HEIGHT: 66 IN | HEART RATE: 113 BPM | BODY MASS INDEX: 39.37 KG/M2 | WEIGHT: 245 LBS

## 2021-12-21 DIAGNOSIS — M47.812 CERVICAL SPONDYLOSIS WITHOUT MYELOPATHY: Primary | ICD-10-CM

## 2021-12-21 PROCEDURE — 99213 OFFICE O/P EST LOW 20 MIN: CPT | Performed by: ANESTHESIOLOGY

## 2021-12-21 ASSESSMENT — ENCOUNTER SYMPTOMS
NAUSEA: 0
SHORTNESS OF BREATH: 0
BACK PAIN: 0
CONSTIPATION: 0
DIARRHEA: 0

## 2021-12-21 NOTE — PROGRESS NOTES
Patient:  Reshma Ledesma  YOB: 1975  Date: 12/21/2021      Subjective:     Reshma Ledesma is a 40 y.o. female who presents today with:    Chief Complaint   Patient presents with    Arm Pain       HPI: The patient presents to the clinic to report that neuralgic pain in the right shoulder and arm has improved except a patch numb area between the neck and shoulder. The patient again denies any radicular symptoms at the moment. We had a long discussion about her pathology based on previous MRIs taken in [place]. They interpreted as a multiple level disc protrusions but never mentioned herniation. Problem is centered around C4-5 level. The patient is on gabapentin and Flexeril. Allergies:  Nickel, Other, Oxycodone-acetaminophen, Neosporin  [bacitracin-polymyxin b], Vitamin b12, Azithromycin, and Vitamin b complex  [b complex-b12]  Past Medical History:   Diagnosis Date    HLD (hyperlipidemia)     Migraine with aura and without status migrainosus, not intractable      Past Surgical History:   Procedure Laterality Date    HAND SURGERY Right     Middle finger trigger finger release    TONSILLECTOMY       Social History     Socioeconomic History    Marital status: Single     Spouse name: Not on file    Number of children: Not on file    Years of education: Not on file    Highest education level: Not on file   Occupational History    Not on file   Tobacco Use    Smoking status: Former Smoker    Smokeless tobacco: Current User     Types: Chew   Substance and Sexual Activity    Alcohol use:  Yes    Drug use: Never    Sexual activity: Not on file   Other Topics Concern    Not on file   Social History Narrative    Not on file     Social Determinants of Health     Financial Resource Strain: Medium Risk    Difficulty of Paying Living Expenses: Somewhat hard   Food Insecurity: No Food Insecurity    Worried About Running Out of Food in the Last Year: Never true    Karthik of Food in the Last Year: Never true   Transportation Needs: No Transportation Needs    Lack of Transportation (Medical): No    Lack of Transportation (Non-Medical): No   Physical Activity:     Days of Exercise per Week: Not on file    Minutes of Exercise per Session: Not on file   Stress:     Feeling of Stress : Not on file   Social Connections:     Frequency of Communication with Friends and Family: Not on file    Frequency of Social Gatherings with Friends and Family: Not on file    Attends Che[employer] Services: Not on file    Active Member of [employer] or Organizations: Not on file    Attends Club or Organization Meetings: Not on file    Marital Status: Not on file   Intimate Partner Violence:     Fear of Current or Ex-Partner: Not on file    Emotionally Abused: Not on file    Physically Abused: Not on file    Sexually Abused: Not on file   Housing Stability:     Unable to Pay for Housing in the Last Year: Not on file    Number of [place] in the Last Year: Not on file    Unstable Housing in the Last Year: Not on file     Family History   Problem Relation Age of Onset    No Known Problems Mother     Diabetes Father     Hypertension Father        Current Outpatient Medications on File Prior to Visit   Medication Sig Dispense Refill    etonogestrel-ethinyl estradiol (NUVARING) 0.12-0.015 MG/24HR vaginal ring Vaginal: One ring, inserted vaginally and left in place continuously for 3 consecutive weeks, then removed for 1 week. A new ring is inserted 7 days after the last was removed 3 each 3    gabapentin (NEURONTIN) 300 MG capsule Take 4 capsules by mouth 2 times daily for 30 days.  240 capsule 1    methylphenidate (RITALIN) 10 MG tablet Take one tablet in the morning and one tablet at 2 pm. 60 tablet 0    cyclobenzaprine (FLEXERIL) 10 MG tablet Take 10 mg by mouth 2 times daily as needed for Muscle spasms       busPIRone (BUSPAR) 5 MG tablet Take 1 tablet by mouth 2 times daily 60 tablet 2     No current facility-administered medications on file prior to visit. She previously tried physical therapy on or about 2020. Recent Procedures:  Distal cervical facet denervation on the right side on11/24 provided Greater than 60 % pain relief for Present days    Review of Systems   Constitutional: Negative for fever. HENT: Negative for hearing loss. Respiratory: Negative for shortness of breath. Gastrointestinal: Negative for constipation, diarrhea and nausea. Genitourinary: Negative for difficulty urinating. Musculoskeletal: Positive for neck pain. Negative for back pain. Skin: Negative for rash. Neurological: Negative for headaches. Hematological: Does not bruise/bleed easily. Psychiatric/Behavioral: Negative for sleep disturbance. Objective:     Vitals:  BP (!) 143/81 (Site: Left Upper Arm, Position: Sitting)   Pulse 113   Temp 97.2 °F (36.2 °C)   Ht 5' 6\" (1.676 m)   Wt 245 lb (111.1 kg)   BMI 39.54 kg/m² Pain Score:   6    PHYSICAL EXAM:    Patient alert and oriented times three, recent and remote memory intact, mood and affect normal, judgement and insight normal. Strength functional for ambulation. Balance and coordinational functional. Visualized skin intact. No visualized cyanosis, pulses intact. Cranial nerves 2-12 grossly intact. No obvious upper motor neuron signs seen. Sensation intact to light touch. No limitations of the range of motion of the cervical spine. Radiculopathy:  Negative    Studies Review:  Reviewed MRI report of Cervical spine dated 8/19/2020. Assessment:           Diagnosis Orders   1. Cervical spondylosis without myelopathy           Plan:     No orders of the defined types were placed in this encounter. No orders of the defined types were placed in this encounter. Follow-up as needed. Follow up:  Return if symptoms worsen or fail to improve.     Ivanna Pablo MD

## 2022-01-06 ENCOUNTER — VIRTUAL VISIT (OUTPATIENT)
Dept: FAMILY MEDICINE CLINIC | Age: 47
End: 2022-01-06
Payer: COMMERCIAL

## 2022-01-06 DIAGNOSIS — J06.9 URI, ACUTE: ICD-10-CM

## 2022-01-06 DIAGNOSIS — U07.1 COVID-19: Primary | ICD-10-CM

## 2022-01-06 LAB
Lab: ABNORMAL
PERFORMING INSTRUMENT: ABNORMAL
QC PASS/FAIL: ABNORMAL
SARS-COV-2, POC: DETECTED

## 2022-01-06 PROCEDURE — 99442 PR PHYS/QHP TELEPHONE EVALUATION 11-20 MIN: CPT | Performed by: NURSE PRACTITIONER

## 2022-01-06 PROCEDURE — 87426 SARSCOV CORONAVIRUS AG IA: CPT | Performed by: NURSE PRACTITIONER

## 2022-01-06 ASSESSMENT — ENCOUNTER SYMPTOMS
VOMITING: 0
ABDOMINAL PAIN: 0
NAUSEA: 0
DIARRHEA: 0
SHORTNESS OF BREATH: 0
SORE THROAT: 1

## 2022-01-07 ENCOUNTER — TELEPHONE (OUTPATIENT)
Dept: FAMILY MEDICINE CLINIC | Age: 47
End: 2022-01-07

## 2022-01-07 NOTE — PROGRESS NOTES
[facility]    TELEHEALTH VISIT -- Audio Only     SUBJECTIVE:    Joaquin Friend is a 40 y.o. female evaluated via telephone on 1/6/2022. Consent:  Mary Smith and/or health care decision maker is aware that she may receive a bill for this telephone service, depending on her insurance coverage, and has provided verbal consent to proceed: Yes    Documentation:  I communicated with the patient about:  Chief Complaint   Patient presents with    Pharyngitis     patient has no appetite    Fever    Chills    Sweats    Fatigue     History of Present Illness:  Mary Smith is currently: suspected of COVID-19. Presenting symptoms: frontal headache, throat irritation, fever suspected not measured, sweats, rare cough- productive. Symptoms began: 01/03/22. Max temperature in last 24 hrs: not checked. Patient denies: shortness of breath, sputum production, chest pain, N/V/D, abdominal pain, loss of smell, loss of taste. Exposure source: work contact. Relevant PMH: migraine, tonsillectomy. Non-smoker. No recent travel. Not vaccinated for COVID-19. Review of Systems   Constitutional: Positive for chills (improved), diaphoresis, fatigue and fever. HENT: Positive for congestion, postnasal drip and sore throat. Negative for trouble swallowing. Respiratory: Positive for cough (productive). Negative for chest tightness and shortness of breath. Cardiovascular: Negative for chest pain. Gastrointestinal: Negative for abdominal pain, diarrhea, nausea and vomiting. Musculoskeletal: Negative for myalgias. Neurological: Positive for headaches. Negative for dizziness and light-headedness.        OBJECTIVE:     Vital Signs: (As obtained by: pt or caregiver)  Patient-Reported Vitals 1/6/2022   Patient-Reported Weight 245   Patient-Reported Height 56   Patient-Reported Pulse 120   Patient-Reported SpO2 95      Exam: N/A  (telehealth audio visit)     POC Testing Today: SARS-COV-2, POC   Date Value Ref Range Status   01/06/2022 Detected (A) Not Detected Final     TELEHEALTH ASSESSMENT & PLAN:   Details of this discussion including any medical advice provided:     40 y.o. female with fever, cough, congestion x 3 days in the context of exposure to COVID-19.    1. COVID-19  Comments:  positive rapid test for SARS-CoV-2. self-isolate except medical care x10d from onset of sxs. supportive care, red flag signs were reviewed. Orders:  -     POCT COVID-19, Antigen  2. URI, acute  Comments:  r/o COVID-19  Orders:  -     POCT COVID-19, Antigen    - POC testing as ordered. - Positive COVID-19 test results were reviewed with patient during VV.  - Instructed to self-isolate except medical care x 10d from onset of sxs (through 1/13/22). - Analgesia/ fever control with OTC acetaminophen, OTC ibuprofen prn.  - Advised on symptomatic care- fluids, rest, upright position for sleeping, intranasal saline prn congestion.  - Red flags and expected time course for resolution were reviewed. - Discussed updated [facility] recs for shortened isolation x5d from date of positive test, may leave house/ end isolation on 1/12  as long as sxs are improved, fever-free x 24 hrs, and wear a mask around others x additional 5 days.  - Needs a negative test result in order to return to work + has medical appts. 01/12.  - Work letter completed. Return for follow-up with your PCP if symptoms are not improving in the next 3 to 5 days- call first.      I affirm this is a Patient Initiated Episode with an Established Patient who has not had a related appointment within my department in the past 7 days or scheduled within the next 24 hours. Total Time: minutes: 11-20 minutes    TELEHEALTH EVALUATION -- Audio/Telephone (During [id number] public health emergency)  This service was provided through telehealth, by OCTAVIO Chung CNP and the patient in his/her home via telephone call.     18:58 minutes were spent on the phone with patient. Provider performed history of present illness and review of systems. Diagnosis and treatment plan was discussed with patient. Pharmacy of choice was reviewed along with past medical history, medication allergies, and current medications. Education provided to patient or patient parents/guardian with current illness diagnosis as well as when to seek additional healthcare due to changing or for worsening symptoms. Patient voiced understanding.      Electronically signed by OCTAVIO Ortez CNP on 1/6/2022 at 7:15 PM

## 2022-01-17 DIAGNOSIS — Z30.44 ENCOUNTER FOR SURVEILLANCE OF VAGINAL RING HORMONAL CONTRACEPTIVE DEVICE: ICD-10-CM

## 2022-01-18 RX ORDER — ETONOGESTREL AND ETHINYL ESTRADIOL 11.7; 2.7 MG/1; MG/1
INSERT, EXTENDED RELEASE VAGINAL
Qty: 3 EACH | Refills: 3 | Status: SHIPPED | OUTPATIENT
Start: 2022-01-18 | End: 2022-02-15 | Stop reason: SDUPTHER

## 2022-01-27 ENCOUNTER — OFFICE VISIT (OUTPATIENT)
Dept: FAMILY MEDICINE CLINIC | Age: 47
End: 2022-01-27
Payer: COMMERCIAL

## 2022-01-27 VITALS
HEIGHT: 66 IN | TEMPERATURE: 98.3 F | OXYGEN SATURATION: 95 % | BODY MASS INDEX: 40.4 KG/M2 | WEIGHT: 251.4 LBS | DIASTOLIC BLOOD PRESSURE: 78 MMHG | HEART RATE: 80 BPM | SYSTOLIC BLOOD PRESSURE: 106 MMHG

## 2022-01-27 DIAGNOSIS — J01.90 ACUTE BACTERIAL SINUSITIS: Primary | ICD-10-CM

## 2022-01-27 DIAGNOSIS — F07.81 POST-CONCUSSION SYNDROME: ICD-10-CM

## 2022-01-27 DIAGNOSIS — M51.36 BULGING LUMBAR DISC: ICD-10-CM

## 2022-01-27 DIAGNOSIS — Z86.16 HISTORY OF COVID-19: ICD-10-CM

## 2022-01-27 DIAGNOSIS — B96.89 ACUTE BACTERIAL SINUSITIS: Primary | ICD-10-CM

## 2022-01-27 DIAGNOSIS — M48.02 FORAMINAL STENOSIS OF CERVICAL REGION: ICD-10-CM

## 2022-01-27 DIAGNOSIS — M47.812 CERVICAL SPONDYLOSIS: ICD-10-CM

## 2022-01-27 PROCEDURE — 99214 OFFICE O/P EST MOD 30 MIN: CPT | Performed by: FAMILY MEDICINE

## 2022-01-27 RX ORDER — AMOXICILLIN AND CLAVULANATE POTASSIUM 875; 125 MG/1; MG/1
1 TABLET, FILM COATED ORAL 2 TIMES DAILY
Qty: 20 TABLET | Refills: 0 | Status: SHIPPED | OUTPATIENT
Start: 2022-01-27 | End: 2022-02-06
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
## 2022-02-10 ENCOUNTER — OFFICE VISIT (OUTPATIENT)
Dept: PAIN MANAGEMENT | Age: 47
End: 2022-02-10
Payer: COMMERCIAL

## 2022-02-10 ENCOUNTER — TELEPHONE (OUTPATIENT)
Dept: PAIN MANAGEMENT | Age: 47
End: 2022-02-10

## 2022-02-10 VITALS
BODY MASS INDEX: 39.37 KG/M2 | DIASTOLIC BLOOD PRESSURE: 72 MMHG | WEIGHT: 245 LBS | HEIGHT: 66 IN | TEMPERATURE: 96.7 F | SYSTOLIC BLOOD PRESSURE: 132 MMHG

## 2022-02-10 DIAGNOSIS — M54.12 CERVICAL RADICULOPATHY: ICD-10-CM

## 2022-02-10 DIAGNOSIS — M47.812 CERVICAL SPONDYLOSIS WITHOUT MYELOPATHY: Primary | ICD-10-CM

## 2022-02-10 PROCEDURE — 99213 OFFICE O/P EST LOW 20 MIN: CPT | Performed by: ANESTHESIOLOGY

## 2022-02-10 RX ORDER — TIZANIDINE 4 MG/1
4 TABLET ORAL 3 TIMES DAILY PRN
Qty: 90 TABLET | Refills: 1 | Status: SHIPPED | OUTPATIENT
Start: 2022-02-10 | End: 2022-03-25 | Stop reason: SDUPTHER

## 2022-02-10 RX ORDER — HYDROCODONE BITARTRATE AND ACETAMINOPHEN 5; 325 MG/1; MG/1
1 TABLET ORAL 2 TIMES DAILY PRN
Qty: 60 TABLET | Refills: 0 | Status: SHIPPED | OUTPATIENT
Start: 2022-02-10 | End: 2022-03-12

## 2022-02-10 ASSESSMENT — ENCOUNTER SYMPTOMS
DIARRHEA: 0
CONSTIPATION: 0
BACK PAIN: 0
SHORTNESS OF BREATH: 0
NAUSEA: 0
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
## 2022-02-11 DIAGNOSIS — M47.812 CERVICAL SPONDYLOSIS WITHOUT MYELOPATHY: Primary | ICD-10-CM

## 2022-02-12 ASSESSMENT — ENCOUNTER SYMPTOMS
COUGH: 1
TROUBLE SWALLOWING: 0
CHEST TIGHTNESS: 0

## 2022-02-13 NOTE — PATIENT INSTRUCTIONS
1. Rapid test for COVID-19 was positive; meaning you have the virus and are contagious. 2. Stay home and self-isolate except to get medical care until 10 days have passed since your symptoms began (stay home through Thursday, 01/13). 3. May end isolation/ be around other people on Friday, 01/14, as long as:  · At least 24 hrs since you last had a fever without taking medicine that lowers fever; and  · Your symptoms are better      COVID-19 Things To Know:  · Supportive care is the mainstay of treatment for COVID-19.  · Drink plenty of fluids and rest as much as needed. · May take over the counter acetaminophen (brand name: Tylenol) and/or ibuprofen (brand name: Motrin) as needed for pain/ fever if you're not allergic or intolerant to those medicines. · Keep upright when sleeping + use a humidifier/ vaporizer to help with drainage at night, gargle warm salt water to soothe a sore throat, use saline nasal spray, apply warm compresses to your sinuses as needed for congestion/ comfort. · Self-monitor your symptoms + contact a medical provider if symptoms are worsening- such as difficulty breathing. · Anticipate 1-2 weeks of cough. · Follow social distancing guidelines and wear a face mask if leaving home is necessary. Seek medical care immediately (ER/ call 911) if you have trouble breathing, persistent pain or pressure in the chest, new confusion, inability to wake or stay awake, bluish lips or face, or any other signs/ symptoms that you think could be an emergency. Results:  SARS-COV-2, POC   Date Value Ref Range Status   01/06/2022 Detected (A) Not Detected Final       Thank you for choosing us for your care    Patient Education        10 Things to Do When You Have COVID-19      Stay home. Don't go to school, work, or public areas. And don't use public transportation, ride-shares, or taxis unless you have no choice. Leave your home only if you need to get medical care.  But call the doctor's office first so they know you're coming. And wear a mask. Ask before leaving isolation. Follow your doctor's advice about when it is safe for you to leave isolation. Wear a mask when you are around other people. It can help stop the spread of the virus. Limit contact with people in your home. If possible, stay in a separate bedroom and use a separate bathroom. Avoid contact with pets and other animals. If possible, have a friend or family member care for them while you're sick. Cover your mouth and nose with a tissue when you cough or sneeze. Then throw the tissue in the trash right away. Wash your hands often, especially after you cough or sneeze. Use soap and water, and scrub for at least 20 seconds. If soap and water aren't available, use an alcohol-based hand . Don't share personal household items. These include bedding, towels, cups and glasses, and eating utensils. Clean and disinfect your home every day. Use household  or disinfectant wipes or sprays. If needed, take acetaminophen (Tylenol) or ibuprofen (Advil, Motrin) to relieve fever and body aches. Read and follow all instructions on the label. Current as of: March 26, 2021               Content Version: 13.1  © 2006-2021 [employer]. Care instructions adapted under license by [employer]. If you have questions about a medical condition or this instruction, always ask your healthcare professional. [place] [address] any warranty or liability for your use of this information.

## 2022-02-15 DIAGNOSIS — Z30.44 ENCOUNTER FOR SURVEILLANCE OF VAGINAL RING HORMONAL CONTRACEPTIVE DEVICE: ICD-10-CM

## 2022-02-15 RX ORDER — ETONOGESTREL AND ETHINYL ESTRADIOL 11.7; 2.7 MG/1; MG/1
INSERT, EXTENDED RELEASE VAGINAL
Qty: 3 EACH | Refills: 3 | Status: SHIPPED | OUTPATIENT
Start: 2022-02-15 | End: 2022-03-14 | Stop reason: SDUPTHER

## 2022-02-16 ENCOUNTER — TELEPHONE (OUTPATIENT)
Dept: PAIN MANAGEMENT | Age: 47
End: 2022-02-16

## 2022-02-16 ENCOUNTER — CLINICAL DOCUMENTATION (OUTPATIENT)
Dept: PAIN MANAGEMENT | Age: 47
End: 2022-02-16

## 2022-02-16 NOTE — TELEPHONE ENCOUNTER
She can have a copy of the progress note of 2/10. Please refer her to Dr. Meg Miller for a neuro consult.

## 2022-02-16 NOTE — TELEPHONE ENCOUNTER
PT. IN THIS MORNING FOR CERVICAL BRACE. PT. ASKING FOR REFERRAL TO DR. REYES AND DR. PRAKASH. WILL DR. LONG PLACE REFERRAL ORDERS (FROM 2/10 VISIT) FOR BOTH DR. REYES AND DR. PRAKASH? PLEASE CONTACT PT. TO  REFERRAL TO DR. Victoriano Onofre () AND TO MAKE CONSULT APPT.  WITH [address] [place] [address] Drive.  [phone]

## 2022-03-02 ENCOUNTER — HOSPITAL ENCOUNTER (OUTPATIENT)
Dept: NEUROLOGY | Age: 47
Discharge: HOME | End: 2022-03-02
Payer: COMMERCIAL

## 2022-03-02 DIAGNOSIS — G56.12 MEDIAN NERVE DYSFUNCTION, LEFT: ICD-10-CM

## 2022-03-02 PROCEDURE — 95911 NRV CNDJ TEST 9-10 STUDIES: CPT

## 2022-03-02 PROCEDURE — 95886 MUSC TEST DONE W/N TEST COMP: CPT | Performed by: PSYCHIATRY & NEUROLOGY

## 2022-03-02 PROCEDURE — 95886 MUSC TEST DONE W/N TEST COMP: CPT

## 2022-03-02 PROCEDURE — 95913 NRV CNDJ TEST 13/> STUDIES: CPT | Performed by: PSYCHIATRY & NEUROLOGY

## 2022-03-02 NOTE — PROCEDURES
[address]                      [facility], [address] [facility]                             ELECTROMYOGRAM REPORT    PATIENT NAME: Glory Ji                    :        1975  MED REC NO:   [id number]                            ROOM:  ACCOUNT NO:   [de-identified]                           ADMIT DATE: 2022  PROVIDER:     Corey Olea MD    DATE OF EM2022    REASON FOR STUDY:  Neurophysiological studies are requested for the  patient's pain in the median nerve distribution on the left. This is  below the elbow. FINDINGS:  MOTOR NERVE CONDUCTION STUDIES:  Motor nerve conduction study of the  right median nerve shows normal amplitudes, latency, and conduction  velocity. Motor nerve conduction study of the left median nerve shows  normal amplitudes, latency, and conduction velocity. There appears to  be a subtle conduction block just above the elbow in the median nerve. The right ulnar nerve motor nerve conduction study shows normal  amplitudes, latency, and conduction velocity. The left ulnar nerve  motor nerve conduction study shows normal amplitudes, latency, and  conduction velocity. F-wave responses are normal.    SENSORY NERVE CONDUCTION STUDIES:  Sensory nerve conduction study of the  right median nerve recorded in digit 2 shows prolonged latencies, normal  amplitudes, and decreased conduction velocity. Further mid palm  stimulation was obtained to confirm findings and shows prolonged  latencies, normal amplitudes, and decreased conduction velocity. The  left median digit 2 examination shows normal amplitudes, latency, and  conduction velocity. The left median mid palm stimulation shows  borderline latency, normal amplitudes, and normal conduction velocity. The right ulnar digit 2 examination shows normal amplitudes, latency,  and conduction velocity.   The left ulnar digit 2 examination shows  normal amplitudes, latency, and conduction velocity. The radial sensory  nerve conduction studies are normal.  The right ulnar and left ulnar  mixed orthodromic studies show normal amplitudes, latency, and  conduction velocity. Needle electrode examination is essentially normal.  Decreased activated  units are seen in the left abductor pollicis brevis muscle, but no  active denervation is notable. IMPRESSION:  1. Moderate right median nerve neuropathy at the wrist suggesting  carpal tunnel syndrome. 2.  The left median conduction studies are essentially normal except for  a subtle conduction block notable just about the elbow on the left  without any other abnormal findings. The needle electrode examination of the pronator teres muscle also  appears to be normal.    These findings may suggest a subtle median nerve dysfunction on the left  that occurred with a blood draw, though the entire nerve appears to be  normal otherwise. An MRI of the elbow and forearm may help to see if  there is any denervation from any damage to this nerve. Needle electrode examination is limited to below the elbow at the  request of the patient. She did not want any needle examination above  the elbow. Multiple sensory nerve conduction studies are evaluated to avoid any  artifact of temperature differences. This test is personally performed and interpreted by me.         Joaquim Singletary MD    D: 03/02/2022 11:17:11       T: 03/02/2022 11:19:29     [email or web address]  Job#: [id number]     Doc#: [id number]    CC:

## 2022-03-08 ENCOUNTER — OFFICE VISIT (OUTPATIENT)
Dept: PAIN MANAGEMENT | Age: 47
End: 2022-03-08
Payer: COMMERCIAL

## 2022-03-08 VITALS
DIASTOLIC BLOOD PRESSURE: 64 MMHG | TEMPERATURE: 97.4 F | BODY MASS INDEX: 39.37 KG/M2 | WEIGHT: 245 LBS | SYSTOLIC BLOOD PRESSURE: 128 MMHG | HEIGHT: 66 IN

## 2022-03-08 DIAGNOSIS — G56.01 CARPAL TUNNEL SYNDROME OF RIGHT WRIST: ICD-10-CM

## 2022-03-08 DIAGNOSIS — M47.812 CERVICAL SPONDYLOSIS WITHOUT MYELOPATHY: ICD-10-CM

## 2022-03-08 DIAGNOSIS — M25.551 HIP PAIN, RIGHT: Primary | ICD-10-CM

## 2022-03-08 DIAGNOSIS — M54.12 CERVICAL RADICULOPATHY: ICD-10-CM

## 2022-03-08 PROCEDURE — 99213 OFFICE O/P EST LOW 20 MIN: CPT | Performed by: ANESTHESIOLOGY

## 2022-03-08 ASSESSMENT — ENCOUNTER SYMPTOMS
DIARRHEA: 0
NAUSEA: 0
SHORTNESS OF BREATH: 0
CONSTIPATION: 0
BACK PAIN: 0
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
## 2022-03-09 ENCOUNTER — TELEPHONE (OUTPATIENT)
Dept: PAIN MANAGEMENT | Age: 47
End: 2022-03-09

## 2022-03-09 NOTE — TELEPHONE ENCOUNTER
Left message on patient's personal voicemail for her to call. The Physical Medical Source Statement form she is requesting to be filled out is not a form our Physicians/Providers are able to complete/fill out. This form requires a Functional Capacity Evaluation. If she does not have a Physician/Provider that is able to complete this exam, we can schedule an appointment with Dr. Susan Benjamin so she may discuss the need for the exam and he can place a referral/order for the exam to be done. Blank form and copy patient filled out are ready for patient to  at [facility].

## 2022-03-14 DIAGNOSIS — F07.81 POST-CONCUSSION SYNDROME: ICD-10-CM

## 2022-03-14 DIAGNOSIS — Z30.44 ENCOUNTER FOR SURVEILLANCE OF VAGINAL RING HORMONAL CONTRACEPTIVE DEVICE: ICD-10-CM

## 2022-03-14 RX ORDER — BUSPIRONE HYDROCHLORIDE 5 MG/1
5 TABLET ORAL 2 TIMES DAILY
Qty: 60 TABLET | Refills: 2 | Status: SHIPPED | OUTPATIENT
Start: 2022-03-14 | End: 2022-05-14 | Stop reason: SDUPTHER

## 2022-03-14 RX ORDER — METHYLPHENIDATE HYDROCHLORIDE 10 MG/1
TABLET ORAL
Qty: 60 TABLET | Refills: 0 | Status: SHIPPED | OUTPATIENT
Start: 2022-03-14 | End: 2022-04-18 | Stop reason: SDUPTHER

## 2022-03-14 RX ORDER — ETONOGESTREL AND ETHINYL ESTRADIOL 11.7; 2.7 MG/1; MG/1
INSERT, EXTENDED RELEASE VAGINAL
Qty: 3 EACH | Refills: 3 | Status: SHIPPED | OUTPATIENT
Start: 2022-03-14 | End: 2022-05-14 | Stop reason: SDUPTHER

## 2022-03-16 DIAGNOSIS — F07.81 POST-CONCUSSION SYNDROME: ICD-10-CM

## 2022-03-17 RX ORDER — GABAPENTIN 300 MG/1
1200 CAPSULE ORAL 2 TIMES DAILY
Qty: 240 CAPSULE | Refills: 1 | Status: SHIPPED | OUTPATIENT
Start: 2022-03-17 | End: 2022-04-17 | Stop reason: SDUPTHER

## 2022-03-22 ENCOUNTER — TELEPHONE (OUTPATIENT)
Dept: NEUROLOGY | Age: 47
End: 2022-03-22

## 2022-03-22 DIAGNOSIS — G62.9 NEUROPATHY: Primary | ICD-10-CM

## 2022-03-22 NOTE — TELEPHONE ENCOUNTER
marbella message sent from patient:     Olam Child we please proceed with the MRI recommendations?      Thank you,   SO

## 2022-03-25 RX ORDER — TIZANIDINE 4 MG/1
4 TABLET ORAL 3 TIMES DAILY PRN
Qty: 90 TABLET | Refills: 1 | Status: SHIPPED | OUTPATIENT
Start: 2022-03-25 | End: 2022-04-24

## 2022-04-12 ENCOUNTER — OFFICE VISIT (OUTPATIENT)
Dept: PAIN MANAGEMENT | Age: 47
End: 2022-04-12
Payer: COMMERCIAL

## 2022-04-12 VITALS — HEIGHT: 66 IN | TEMPERATURE: 96.7 F | BODY MASS INDEX: 39.37 KG/M2 | WEIGHT: 245 LBS

## 2022-04-12 DIAGNOSIS — M47.812 CERVICAL SPONDYLOSIS WITHOUT MYELOPATHY: Primary | ICD-10-CM

## 2022-04-12 PROCEDURE — 99213 OFFICE O/P EST LOW 20 MIN: CPT | Performed by: ANESTHESIOLOGY

## 2022-04-12 ASSESSMENT — ENCOUNTER SYMPTOMS
BACK PAIN: 0
CONSTIPATION: 0
SHORTNESS OF BREATH: 0
DIARRHEA: 0
NAUSEA: 0
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
## 2022-04-13 ENCOUNTER — TELEPHONE (OUTPATIENT)
Dept: NEUROSURGERY | Age: 47
End: 2022-04-13

## 2022-04-13 NOTE — TELEPHONE ENCOUNTER
Pt is scheduled to see Dr. Simón Sheppard next week for consult. Pt will need to bring her c/s x-rays on disc. Also inquired if pt has had C/S MRI done yet - if so, she will also need to bring that on disc. If MRI has not yet been done, would be consider R/S until after MRI has been completed?

## 2022-04-17 DIAGNOSIS — F07.81 POST-CONCUSSION SYNDROME: ICD-10-CM

## 2022-04-18 DIAGNOSIS — F07.81 POST-CONCUSSION SYNDROME: ICD-10-CM

## 2022-04-18 RX ORDER — METHYLPHENIDATE HYDROCHLORIDE 10 MG/1
TABLET ORAL
Qty: 60 TABLET | Refills: 0 | Status: SHIPPED | OUTPATIENT
Start: 2022-04-18 | End: 2022-05-14 | Stop reason: SDUPTHER

## 2022-04-18 RX ORDER — GABAPENTIN 300 MG/1
1200 CAPSULE ORAL 2 TIMES DAILY
Qty: 240 CAPSULE | Refills: 0 | Status: SHIPPED | OUTPATIENT
Start: 2022-04-18 | End: 2022-05-14 | Stop reason: SDUPTHER

## 2022-04-18 NOTE — TELEPHONE ENCOUNTER
Patient is requesting medication refill.  Please approve or deny this request.    Rx requested:  Requested Prescriptions     Pending Prescriptions Disp Refills    methylphenidate (RITALIN) 10 MG tablet 60 tablet 0     Sig: Take one tablet in the morning and one tablet at 2 pm.         Last Office Visit:   5/12/2021      Next Visit Date:  Future Appointments   Date Time Provider Marleny Bryani   5/8/2022 10:30 AM [facility] ROOM 1 [facility] RAD   5/15/2022 11:30 AM [facility]   6/7/2022  2:30 PM Martine Farley MD [facility] Hannah Romo   7/19/2022  3:15 PM Prabhakar Brewster MD [facility]   7/26/2022 10:45 AM Nestor Erwin MD [address] [place] [address] 7

## 2022-04-29 DIAGNOSIS — R82.998 AMBER-COLORED URINE: Primary | ICD-10-CM

## 2022-05-04 ENCOUNTER — HOSPITAL ENCOUNTER (OUTPATIENT)
Dept: LAB | Age: 47
Discharge: HOME | End: 2022-05-04
Payer: COMMERCIAL

## 2022-05-04 DIAGNOSIS — R82.998 AMBER-COLORED URINE: ICD-10-CM

## 2022-05-04 LAB
ALBUMIN SERPL-MCNC: 3.7 G/DL (ref 3.5–4.6)
ALP BLD-CCNC: 95 U/L (ref 40–130)
ALT SERPL-CCNC: 555 U/L (ref 0–33)
ANION GAP SERPL CALCULATED.3IONS-SCNC: 15 MEQ/L (ref 9–15)
AST SERPL-CCNC: 278 U/L (ref 0–35)
BASOPHILS ABSOLUTE: 0 K/UL (ref 0–0.2)
BASOPHILS RELATIVE PERCENT: 0.6 %
BILIRUB SERPL-MCNC: 0.9 MG/DL (ref 0.2–0.7)
BILIRUBIN URINE: NEGATIVE
BLOOD, URINE: NEGATIVE
BUN BLDV-MCNC: 8 MG/DL (ref 6–20)
CALCIUM SERPL-MCNC: 8.7 MG/DL (ref 8.5–9.9)
CHLORIDE BLD-SCNC: 105 MEQ/L (ref 95–107)
CLARITY: CLEAR
CO2: 20 MEQ/L (ref 20–31)
COLOR: YELLOW
CREAT SERPL-MCNC: 0.72 MG/DL (ref 0.5–0.9)
EOSINOPHILS ABSOLUTE: 0.1 K/UL (ref 0–0.7)
EOSINOPHILS RELATIVE PERCENT: 1.4 %
GFR AFRICAN AMERICAN: >60
GFR NON-AFRICAN AMERICAN: >60
GLOBULIN: 3.4 G/DL (ref 2.3–3.5)
GLUCOSE BLD-MCNC: 256 MG/DL (ref 70–99)
GLUCOSE URINE: >=1000 MG/DL
HCT VFR BLD CALC: 42.3 % (ref 37–47)
HEMOGLOBIN: 13.5 G/DL (ref 12–16)
KETONES, URINE: NEGATIVE MG/DL
LEUKOCYTE ESTERASE, URINE: NEGATIVE
LYMPHOCYTES ABSOLUTE: 2.2 K/UL (ref 1–4.8)
LYMPHOCYTES RELATIVE PERCENT: 29.6 %
MCH RBC QN AUTO: 25.6 PG (ref 27–31.3)
MCHC RBC AUTO-ENTMCNC: 31.9 % (ref 33–37)
MCV RBC AUTO: 80.3 FL (ref 82–100)
MONOCYTES ABSOLUTE: 0.8 K/UL (ref 0.2–0.8)
MONOCYTES RELATIVE PERCENT: 10.3 %
NEUTROPHILS ABSOLUTE: 4.4 K/UL (ref 1.4–6.5)
NEUTROPHILS RELATIVE PERCENT: 58.1 %
NITRITE, URINE: NEGATIVE
PDW BLD-RTO: 15.7 % (ref 11.5–14.5)
PH UA: 6 (ref 5–9)
PLATELET # BLD: 220 K/UL (ref 130–400)
POTASSIUM SERPL-SCNC: 3.9 MEQ/L (ref 3.4–4.9)
PROTEIN UA: NEGATIVE MG/DL
RBC # BLD: 5.27 M/UL (ref 4.2–5.4)
SODIUM BLD-SCNC: 140 MEQ/L (ref 135–144)
SPECIFIC GRAVITY UA: 1.03 (ref 1–1.03)
TOTAL PROTEIN: 7.1 G/DL (ref 6.3–8)
URINE REFLEX TO CULTURE: ABNORMAL
UROBILINOGEN, URINE: 0.2 E.U./DL
WBC # BLD: 7.5 K/UL (ref 4.8–10.8)

## 2022-05-04 PROCEDURE — 81003 URINALYSIS AUTO W/O SCOPE: CPT

## 2022-05-04 PROCEDURE — 36415 COLL VENOUS BLD VENIPUNCTURE: CPT

## 2022-05-04 PROCEDURE — 80053 COMPREHEN METABOLIC PANEL: CPT

## 2022-05-04 PROCEDURE — 85025 COMPLETE CBC W/AUTO DIFF WBC: CPT

## 2022-05-06 ENCOUNTER — HOSPITAL ENCOUNTER (OUTPATIENT)
Dept: LAB | Age: 47
Discharge: HOME | End: 2022-05-06
Payer: COMMERCIAL

## 2022-05-06 DIAGNOSIS — R74.8 ELEVATED LIVER ENZYMES: ICD-10-CM

## 2022-05-06 DIAGNOSIS — Z87.898 HISTORY OF URINE COLOR CHANGES: ICD-10-CM

## 2022-05-06 DIAGNOSIS — R74.8 ELEVATED LIVER ENZYMES: Primary | ICD-10-CM

## 2022-05-06 LAB
ALBUMIN SERPL-MCNC: 4 G/DL (ref 3.5–4.6)
ALP BLD-CCNC: 105 U/L (ref 40–130)
ALT SERPL-CCNC: 586 U/L (ref 0–33)
ANION GAP SERPL CALCULATED.3IONS-SCNC: 14 MEQ/L (ref 9–15)
AST SERPL-CCNC: 223 U/L (ref 0–35)
BILIRUB SERPL-MCNC: 1.1 MG/DL (ref 0.2–0.7)
BUN BLDV-MCNC: 8 MG/DL (ref 6–20)
CALCIUM SERPL-MCNC: 9.3 MG/DL (ref 8.5–9.9)
CHLORIDE BLD-SCNC: 106 MEQ/L (ref 95–107)
CO2: 17 MEQ/L (ref 20–31)
CREAT SERPL-MCNC: 0.63 MG/DL (ref 0.5–0.9)
GFR AFRICAN AMERICAN: >60
GFR NON-AFRICAN AMERICAN: >60
GLOBULIN: 3.7 G/DL (ref 2.3–3.5)
GLUCOSE BLD-MCNC: 179 MG/DL (ref 70–99)
POTASSIUM SERPL-SCNC: 4.1 MEQ/L (ref 3.4–4.9)
SODIUM BLD-SCNC: 137 MEQ/L (ref 135–144)
TOTAL PROTEIN: 7.7 G/DL (ref 6.3–8)

## 2022-05-06 PROCEDURE — 80053 COMPREHEN METABOLIC PANEL: CPT

## 2022-05-06 PROCEDURE — 36415 COLL VENOUS BLD VENIPUNCTURE: CPT

## 2022-05-08 ENCOUNTER — APPOINTMENT (OUTPATIENT)
Dept: CT IMAGING | Age: 47
End: 2022-05-08
Payer: COMMERCIAL

## 2022-05-08 ENCOUNTER — HOSPITAL ENCOUNTER (EMERGENCY)
Age: 47
Discharge: HOME | End: 2022-05-09
Payer: COMMERCIAL

## 2022-05-08 DIAGNOSIS — R10.10 PAIN OF UPPER ABDOMEN: Primary | ICD-10-CM

## 2022-05-08 LAB
ALBUMIN SERPL-MCNC: 4 G/DL (ref 3.5–4.6)
ALP BLD-CCNC: 102 U/L (ref 40–130)
ALT SERPL-CCNC: 328 U/L (ref 0–33)
AST SERPL-CCNC: 92 U/L (ref 0–35)
BASOPHILS ABSOLUTE: 0.1 K/UL (ref 0–0.2)
BASOPHILS RELATIVE PERCENT: 0.7 %
BILIRUB SERPL-MCNC: 1.1 MG/DL (ref 0.2–0.7)
BILIRUBIN DIRECT: 0.5 MG/DL (ref 0–0.4)
BILIRUBIN URINE: NEGATIVE
BILIRUBIN, INDIRECT: 0.6 MG/DL (ref 0–0.6)
BLOOD, URINE: NEGATIVE
CLARITY: CLEAR
COLOR: ABNORMAL
EOSINOPHILS ABSOLUTE: 0 K/UL (ref 0–0.7)
EOSINOPHILS RELATIVE PERCENT: 0.1 %
GLUCOSE URINE: NEGATIVE MG/DL
HCT VFR BLD CALC: 42.8 % (ref 37–47)
HEMOGLOBIN: 14 G/DL (ref 12–16)
KETONES, URINE: ABNORMAL MG/DL
LACTIC ACID: 1.9 MMOL/L (ref 0.5–2.2)
LEUKOCYTE ESTERASE, URINE: NEGATIVE
LIPASE: 24 U/L (ref 12–95)
LYMPHOCYTES ABSOLUTE: 2.1 K/UL (ref 1–4.8)
LYMPHOCYTES RELATIVE PERCENT: 16.3 %
MAGNESIUM: 2 MG/DL (ref 1.7–2.4)
MCH RBC QN AUTO: 25.4 PG (ref 27–31.3)
MCHC RBC AUTO-ENTMCNC: 32.7 % (ref 33–37)
MCV RBC AUTO: 77.5 FL (ref 82–100)
MONOCYTES ABSOLUTE: 0.9 K/UL (ref 0.2–0.8)
MONOCYTES RELATIVE PERCENT: 7.2 %
NEUTROPHILS ABSOLUTE: 9.7 K/UL (ref 1.4–6.5)
NEUTROPHILS RELATIVE PERCENT: 75.7 %
NITRITE, URINE: NEGATIVE
PDW BLD-RTO: 15.9 % (ref 11.5–14.5)
PH UA: 5 (ref 5–9)
PLATELET # BLD: 237 K/UL (ref 130–400)
PROTEIN UA: NEGATIVE MG/DL
RBC # BLD: 5.52 M/UL (ref 4.2–5.4)
SPECIFIC GRAVITY UA: 1.03 (ref 1–1.03)
TOTAL CK: 123 U/L (ref 0–170)
TOTAL PROTEIN: 7.3 G/DL (ref 6.3–8)
URINE REFLEX TO CULTURE: ABNORMAL
UROBILINOGEN, URINE: 0.2 E.U./DL
WBC # BLD: 12.9 K/UL (ref 4.8–10.8)

## 2022-05-08 PROCEDURE — 83605 ASSAY OF LACTIC ACID: CPT

## 2022-05-08 PROCEDURE — 6360000002 HC RX W HCPCS: Performed by: PHYSICIAN ASSISTANT

## 2022-05-08 PROCEDURE — 80076 HEPATIC FUNCTION PANEL: CPT

## 2022-05-08 PROCEDURE — 83690 ASSAY OF LIPASE: CPT

## 2022-05-08 PROCEDURE — 99284 EMERGENCY DEPT VISIT MOD MDM: CPT

## 2022-05-08 PROCEDURE — 2580000003 HC RX 258: Performed by: PHYSICIAN ASSISTANT

## 2022-05-08 PROCEDURE — 82550 ASSAY OF CK (CPK): CPT

## 2022-05-08 PROCEDURE — 85025 COMPLETE CBC W/AUTO DIFF WBC: CPT

## 2022-05-08 PROCEDURE — 80048 BASIC METABOLIC PNL TOTAL CA: CPT

## 2022-05-08 PROCEDURE — 36415 COLL VENOUS BLD VENIPUNCTURE: CPT

## 2022-05-08 PROCEDURE — 81003 URINALYSIS AUTO W/O SCOPE: CPT

## 2022-05-08 PROCEDURE — 83735 ASSAY OF MAGNESIUM: CPT

## 2022-05-08 RX ORDER — KETOROLAC TROMETHAMINE 30 MG/ML
30 INJECTION, SOLUTION INTRAMUSCULAR; INTRAVENOUS ONCE
Status: COMPLETED | OUTPATIENT
Start: 2022-05-08 | End: 2022-05-08

## 2022-05-08 RX ORDER — ONDANSETRON 2 MG/ML
4 INJECTION INTRAMUSCULAR; INTRAVENOUS ONCE
Status: COMPLETED | OUTPATIENT
Start: 2022-05-08 | End: 2022-05-08

## 2022-05-08 RX ORDER — 0.9 % SODIUM CHLORIDE 0.9 %
1000 INTRAVENOUS SOLUTION INTRAVENOUS ONCE
Status: COMPLETED | OUTPATIENT
Start: 2022-05-08 | End: 2022-05-09

## 2022-05-08 RX ADMIN — KETOROLAC TROMETHAMINE 30 MG: 30 INJECTION, SOLUTION INTRAMUSCULAR at 23:27

## 2022-05-08 RX ADMIN — SODIUM CHLORIDE 1000 ML: 9 INJECTION, SOLUTION INTRAVENOUS at 23:26

## 2022-05-08 RX ADMIN — ONDANSETRON 4 MG: 2 INJECTION INTRAMUSCULAR; INTRAVENOUS at 23:27

## 2022-05-08 ASSESSMENT — PAIN DESCRIPTION - DESCRIPTORS
DESCRIPTORS: SHARP
DESCRIPTORS: ACHING

## 2022-05-08 ASSESSMENT — ENCOUNTER SYMPTOMS
ABDOMINAL PAIN: 1
APNEA: 0
ALLERGIC/IMMUNOLOGIC NEGATIVE: 1
DIARRHEA: 1
TROUBLE SWALLOWING: 0
SHORTNESS OF BREATH: 0
VOMITING: 1
NAUSEA: 1
EYE PAIN: 0
COLOR CHANGE: 0

## 2022-05-08 ASSESSMENT — PAIN DESCRIPTION - FREQUENCY: FREQUENCY: CONTINUOUS

## 2022-05-08 ASSESSMENT — PAIN - FUNCTIONAL ASSESSMENT: PAIN_FUNCTIONAL_ASSESSMENT: 0-10

## 2022-05-08 ASSESSMENT — PAIN DESCRIPTION - LOCATION
LOCATION: ABDOMEN
LOCATION: ABDOMEN

## 2022-05-08 ASSESSMENT — PAIN SCALES - GENERAL
PAINLEVEL_OUTOF10: 4
PAINLEVEL_OUTOF10: 6

## 2022-05-08 ASSESSMENT — PAIN DESCRIPTION - ONSET: ONSET: ON-GOING

## 2022-05-08 ASSESSMENT — PAIN DESCRIPTION - ORIENTATION: ORIENTATION: RIGHT

## 2022-05-08 ASSESSMENT — PAIN DESCRIPTION - PAIN TYPE: TYPE: ACUTE PAIN

## 2022-05-08 NOTE — Clinical Note
Cinthia Hurst was seen and treated in our emergency department on 5/8/2022. She may return to work on 05/16/2022. If you have any questions or concerns, please don't hesitate to call.       Onel Jack PA-C

## 2022-05-09 ENCOUNTER — APPOINTMENT (OUTPATIENT)
Dept: CT IMAGING | Age: 47
End: 2022-05-09
Payer: COMMERCIAL

## 2022-05-09 VITALS
OXYGEN SATURATION: 100 % | BODY MASS INDEX: 39.37 KG/M2 | WEIGHT: 245 LBS | HEIGHT: 66 IN | SYSTOLIC BLOOD PRESSURE: 130 MMHG | DIASTOLIC BLOOD PRESSURE: 78 MMHG | TEMPERATURE: 98.7 F | HEART RATE: 80 BPM | RESPIRATION RATE: 20 BRPM

## 2022-05-09 DIAGNOSIS — R74.8 ELEVATED LIVER ENZYMES: Primary | ICD-10-CM

## 2022-05-09 LAB
ANION GAP SERPL CALCULATED.3IONS-SCNC: 12 MEQ/L (ref 9–15)
BUN BLDV-MCNC: 9 MG/DL (ref 6–20)
CALCIUM SERPL-MCNC: 9.4 MG/DL (ref 8.5–9.9)
CHLORIDE BLD-SCNC: 101 MEQ/L (ref 95–107)
CO2: 21 MEQ/L (ref 20–31)
CREAT SERPL-MCNC: 0.66 MG/DL (ref 0.5–0.9)
GFR AFRICAN AMERICAN: >60
GFR NON-AFRICAN AMERICAN: >60
GLUCOSE BLD-MCNC: 205 MG/DL (ref 70–99)
POTASSIUM SERPL-SCNC: 3.6 MEQ/L (ref 3.4–4.9)
SODIUM BLD-SCNC: 134 MEQ/L (ref 135–144)

## 2022-05-09 PROCEDURE — 6360000004 HC RX CONTRAST MEDICATION: Performed by: PHYSICIAN ASSISTANT

## 2022-05-09 PROCEDURE — 74177 CT ABD & PELVIS W/CONTRAST: CPT

## 2022-05-09 PROCEDURE — 6360000002 HC RX W HCPCS: Performed by: PHYSICIAN ASSISTANT

## 2022-05-09 PROCEDURE — A4216 STERILE WATER/SALINE, 10 ML: HCPCS | Performed by: PHYSICIAN ASSISTANT

## 2022-05-09 PROCEDURE — 96374 THER/PROPH/DIAG INJ IV PUSH: CPT

## 2022-05-09 PROCEDURE — 6370000000 HC RX 637 (ALT 250 FOR IP): Performed by: PHYSICIAN ASSISTANT

## 2022-05-09 PROCEDURE — 2500000003 HC RX 250 WO HCPCS: Performed by: PHYSICIAN ASSISTANT

## 2022-05-09 PROCEDURE — 96375 TX/PRO/DX INJ NEW DRUG ADDON: CPT

## 2022-05-09 PROCEDURE — 2580000003 HC RX 258: Performed by: PHYSICIAN ASSISTANT

## 2022-05-09 RX ORDER — ONDANSETRON 4 MG/1
4 TABLET, ORALLY DISINTEGRATING ORAL EVERY 8 HOURS PRN
Qty: 20 TABLET | Refills: 0 | Status: SHIPPED | OUTPATIENT
Start: 2022-05-09

## 2022-05-09 RX ORDER — PANTOPRAZOLE SODIUM 20 MG/1
40 TABLET, DELAYED RELEASE ORAL DAILY
Qty: 60 TABLET | Refills: 0 | Status: SHIPPED | OUTPATIENT
Start: 2022-05-09 | End: 2022-05-16 | Stop reason: SDUPTHER

## 2022-05-09 RX ORDER — DICYCLOMINE HYDROCHLORIDE 10 MG/1
10 CAPSULE ORAL EVERY 6 HOURS PRN
Qty: 20 CAPSULE | Refills: 0 | Status: SHIPPED | OUTPATIENT
Start: 2022-05-09 | End: 2022-05-16 | Stop reason: SDUPTHER

## 2022-05-09 RX ORDER — DICYCLOMINE HYDROCHLORIDE 10 MG/ML
20 INJECTION INTRAMUSCULAR ONCE
Status: DISCONTINUED | OUTPATIENT
Start: 2022-05-09 | End: 2022-05-09 | Stop reason: HOSPADM

## 2022-05-09 RX ORDER — DICYCLOMINE HYDROCHLORIDE 10 MG/1
10 CAPSULE ORAL ONCE
Status: COMPLETED | OUTPATIENT
Start: 2022-05-09 | End: 2022-05-09

## 2022-05-09 RX ADMIN — IOPAMIDOL 100 ML: 612 INJECTION, SOLUTION INTRAVENOUS at 00:12

## 2022-05-09 RX ADMIN — LIDOCAINE HYDROCHLORIDE: 20 SOLUTION ORAL; TOPICAL at 00:31

## 2022-05-09 RX ADMIN — DICYCLOMINE HYDROCHLORIDE 10 MG: 10 CAPSULE ORAL at 01:42

## 2022-05-09 RX ADMIN — FAMOTIDINE 20 MG: 10 INJECTION INTRAVENOUS at 00:31

## 2022-05-09 ASSESSMENT — PAIN SCALES - GENERAL
PAINLEVEL_OUTOF10: 3
PAINLEVEL_OUTOF10: 4

## 2022-05-09 ASSESSMENT — PAIN DESCRIPTION - DESCRIPTORS: DESCRIPTORS: ACHING

## 2022-05-09 ASSESSMENT — PAIN DESCRIPTION - LOCATION: LOCATION: ABDOMEN

## 2022-05-09 NOTE — ED PROVIDER NOTES
[address] [facility] ED  eMERGENCYdEPARTMENT eNCOUnter      Pt Name: Polo Michel  MRN: [id number]  [place] 1975  Date of evaluation: 5/8/2022  Provider:Angel Elizabeth PA-C    CHIEF COMPLAINT       Chief Complaint   Patient presents with    Abdominal Pain         HISTORY OF PRESENT ILLNESS  (Location/Symptom, Timing/Onset, Context/Setting, Quality, Duration, Modifying Factors, Severity.)   Polo Michel is a 56 y.o. female who presents to the emergency department with complaints of right upper quadrant abdominal pain that radiates diffusely through the abdomen upper with associated nausea, vomiting and diarrhea. Patient states that the symptoms have been ongoing and progressively worsening over the past week. Patient also states that she had lab work done by her PCPs office on Wednesday and had elevated liver function test.  Patient states that she had been on tizanidine and thought that that may have been the cause. Patient states that today the symptoms are worse than they have been previously. Patient denies any radiation of pain into the back. Patient also states that she has had \"rust colored\" urine    HPI    Nursing Notes were reviewed and I agree. REVIEW OF SYSTEMS    (2-9 systems for level 4, 10 or more for level 5)     Review of Systems   Constitutional: Negative for diaphoresis and fever. HENT: Negative for hearing loss and trouble swallowing. Eyes: Negative for pain. Respiratory: Negative for apnea and shortness of breath. Cardiovascular: Negative for chest pain. Gastrointestinal: Positive for abdominal pain, diarrhea, nausea and vomiting. Endocrine: Negative. Genitourinary: Negative for hematuria. Musculoskeletal: Negative for neck pain and neck stiffness. Skin: Negative for color change. Allergic/Immunologic: Negative. Neurological: Negative for dizziness and numbness. Hematological: Negative. Psychiatric/Behavioral: Negative.     All other systems reviewed and are negative. Except as noted above the remainder of the review of systems was reviewed and negative. PAST MEDICAL HISTORY     Past Medical History:   Diagnosis Date    HLD (hyperlipidemia)     Migraine with aura and without status migrainosus, not intractable          SURGICAL HISTORY       Past Surgical History:   Procedure Laterality Date    HAND SURGERY Right     Middle finger trigger finger release    TONSILLECTOMY           CURRENT MEDICATIONS       Previous Medications    BUSPIRONE (BUSPAR) 5 MG TABLET    Take 1 tablet by mouth 2 times daily    CYCLOBENZAPRINE (FLEXERIL) 10 MG TABLET    Take 10 mg by mouth 2 times daily as needed for Muscle spasms     ETONOGESTREL-ETHINYL ESTRADIOL (NUVARING) 0.12-0.015 MG/24HR VAGINAL RING    Vaginal: One ring, inserted vaginally and left in place continuously for 3 consecutive weeks, then removed for 1 week. A new ring is inserted 7 days after the last was removed    GABAPENTIN (NEURONTIN) 300 MG CAPSULE    Take 4 capsules by mouth 2 times daily for 30 days. METHYLPHENIDATE (RITALIN) 10 MG TABLET    Take one tablet in the morning and one tablet at 2 pm.       ALLERGIES     Nickel, Other, Oxycodone-acetaminophen, Neosporin  [bacitracin-polymyxin b], Vitamin b12, Azithromycin, and Vitamin b complex  [b complex-b12]    FAMILY HISTORY       Family History   Problem Relation Age of Onset    No Known Problems Mother     Diabetes Father     Hypertension Father           SOCIAL HISTORY       Social History     Socioeconomic History    Marital status: Single     Spouse name: None    Number of children: None    Years of education: None    Highest education level: None   Occupational History    None   Tobacco Use    Smoking status: Former Smoker    Smokeless tobacco: Current User     Types: Chew   Substance and Sexual Activity    Alcohol use:  Yes    Drug use: Never    Sexual activity: None   Other Topics Concern    None   Social History Narrative    None     Social Determinants of Health     Financial Resource Strain:     Difficulty of Paying Living Expenses: Not on file   Food Insecurity:     Worried About Running Out of Food in the Last Year: Not on file    Karthik of Food in the Last Year: Not on file   Transportation Needs:     Lack of Transportation (Medical): Not on file    Lack of Transportation (Non-Medical): Not on file   Physical Activity:     Days of Exercise per Week: Not on file    Minutes of Exercise per Session: Not on file   Stress:     Feeling of Stress : Not on file   Social Connections:     Frequency of Communication with Friends and Family: Not on file    Frequency of Social Gatherings with Friends and Family: Not on file    Attends [employer] Services: Not on file    Active Member of [address] [place] [address] [employer] or Organizations: Not on file    Attends Club or Organization Meetings: Not on file    Marital Status: Not on file   Intimate Partner Violence:     Fear of Current or Ex-Partner: Not on file    Emotionally Abused: Not on file    Physically Abused: Not on file    Sexually Abused: Not on file   Housing Stability:     Unable to Pay for Housing in the Last Year: Not on file    Number of [place] in the Last Year: Not on file    Unstable Housing in the Last Year: Not on file       SCREENINGS    Isael Coma Scale  Eye Opening: Spontaneous  Best Verbal Response: Oriented  Best Motor Response: Obeys commands  Isael Coma Scale Score: 15      PHYSICAL EXAM    (up to 7 forlevel 4, 8 or more for level 5)     ED Triage Vitals [05/08/22 2129]   BP Temp Temp Source Pulse Resp SpO2 Height Weight   (!) 140/82 98.7 °F (37.1 °C) Oral 138 20 98 % 5' 6\" (1.676 m) 245 lb (111.1 kg)       Physical Exam  Vitals and nursing note reviewed. Constitutional:       General: She is not in acute distress. Appearance: She is well-developed. She is not diaphoretic. HENT:      Head: Normocephalic and atraumatic.       Mouth/Throat: Pharynx: No oropharyngeal exudate. Eyes:      General: No scleral icterus. Conjunctiva/sclera: Conjunctivae normal.      Pupils: Pupils are equal, round, and reactive to light. Neck:      Trachea: No tracheal deviation. Cardiovascular:      Rate and Rhythm: Normal rate. Heart sounds: Normal heart sounds. Pulmonary:      Effort: Pulmonary effort is normal. No respiratory distress. Breath sounds: Normal breath sounds. Abdominal:      General: Bowel sounds are normal. There is no distension. Palpations: Abdomen is soft. Tenderness: There is abdominal tenderness in the right upper quadrant, epigastric area and left upper quadrant. There is no guarding or rebound. Positive signs include Malcolm's sign. Musculoskeletal:         General: Normal range of motion. Cervical back: Normal range of motion and neck supple. Skin:     General: Skin is warm and dry. Findings: No erythema or rash. Neurological:      Mental Status: She is alert and oriented to person, place, and time. Cranial Nerves: No cranial nerve deficit. Motor: No abnormal muscle tone. Psychiatric:         Behavior: Behavior normal.         Thought Content: Thought content normal.         Judgment: Judgment normal.           DIAGNOSTIC RESULTS     RADIOLOGY:   Non-plain film images such as CT, Ultrasound and MRI are read by the radiologist. Plain radiographic images are visualized and preliminarilyinterpreted by Onel Jack PA-C with the below findings:      Interpretation per the Radiologist below, if available at the time of this note:    CT ABDOMEN PELVIS W IV CONTRAST Additional Contrast? None   Final Result   1.  .  The gallbladder is moderately distended without evidence for    calcified gallstones. No CT evidence for gallbladder wall thickening or    biliary dilatation. 2.  No bowel obstruction. No definite asymmetric bowel mucosal    abnormality.   No free intraperitoneal fluid or pneumoperitoneum. 3.  Nonspecific mesenteric lymph nodes measuring up to 10 mm in short    axis diameter. No significant mesenteric fat stranding. This is a    nonspecific finding and presumed related to chronic changes. However,    nonspecific mesenteritis from occult small bowel process is also a    consideration. 4.  Partially calcified soft tissue lesion noted in the posterior    mediastinum adjacent to the distal thoracic esophagus measuring 3 x 2.6 x    2.6 cm. No other lymphadenopathy in the inferior thorax, abdomen or    pelvis identified. Differential consideration includes partially    calcified granulomatous involvement, Castleman's disease or other soft    tissue posterior mediastinal mass. Please correlate with prior imaging,    if available. Nonemergent dedicated imaging of the chest is recommended    for further evaluation. LABS:  Labs Reviewed   CBC WITH AUTO DIFFERENTIAL - Abnormal; Notable for the following components:       Result Value    WBC 12.9 (*)     RBC 5.52 (*)     MCV 77.5 (*)     MCH 25.4 (*)     MCHC 32.7 (*)     RDW 15.9 (*)     Neutrophils Absolute 9.7 (*)     Monocytes Absolute 0.9 (*)     All other components within normal limits   URINALYSIS WITH REFLEX TO CULTURE - Abnormal; Notable for the following components:    Color, UA DARK YELLOW (*)     Ketones, Urine TRACE (*)     All other components within normal limits   BASIC METABOLIC PANEL - Abnormal; Notable for the following components:    Sodium 134 (*)     Glucose 205 (*)     All other components within normal limits   HEPATIC FUNCTION PANEL - Abnormal; Notable for the following components:     (*)     AST 92 (*)     Total Bilirubin 1.1 (*)     Bilirubin, Direct 0.5 (*)     All other components within normal limits   LIPASE   LACTIC ACID   MAGNESIUM   CK       All other labs were within normal range or not returnedas of this dictation.     EMERGENCYDEPARTMENT COURSE and DIFFERENTIAL DIAGNOSIS/MDM: Vitals:    Vitals:    05/08/22 2129 05/09/22 0034   BP: (!) 140/82 (!) 129/90   Pulse: 138 82   Resp: 20    Temp: 98.7 °F (37.1 °C)    TempSrc: Oral    SpO2: 98% 100%   Weight: 245 lb (111.1 kg)    Height: 5' 6\" (1.676 m)        REASSESSMENT      Patient presented the emergency department with complaints of upper abdominal pain beginning in the right upper quadrant of the abdomen as well as associated nausea and chronic diarrhea. Patient had elevated liver function tests and repeat laboratory testing today does show a decline in her LFTs returning towards her baseline. Patient has been afebrile in the emergency department. CT scan shows moderate distended gallbladder without evidence of calcified gallstones and no evidence of gallbladder wall thickening or biliary dilatation. Discussed the case with general surgery who recommends following up in clinic for ultrasound to rule out gallbladder disease. In addition, I did discuss the soft tissue lesion in the posterior mediastinum that is adjacent to the distal thoracic esophagus. Given this finding, patient will be referred to gastroenterology for follow-up for further evaluation    MDM    PROCEDURES:    Procedures      FINAL IMPRESSION      1.  Pain of upper abdomen          DISPOSITION/PLAN   DISPOSITION Decision To Discharge 05/09/2022 01:25:03 AM      PATIENT REFERRED TO:  Mague Laboy MD  [address] [facility] [address]  [phone]    Call in 1 day      [facility], [facility] [address] [place] [address]  [phone]    Call in 2 days      Rey Danielson MD  [address] [place] [address]  [phone]    Call in 2 days        DISCHARGE MEDICATIONS:  New Prescriptions    DICYCLOMINE (BENTYL) 10 MG CAPSULE    Take 1 capsule by mouth every 6 hours as needed (abdominal pain)    ONDANSETRON (ZOFRAN ODT) 4 MG DISINTEGRATING TABLET    Take 1 tablet by mouth every 8 hours as needed for Nausea    PANTOPRAZOLE (PROTONIX) 20 MG TABLET Take 2 tablets by mouth daily       (Please note that portions of this note were completed with a voice recognition program.  Efforts were made to edit the dictations but occasionally words are mis-transcribed.)    ROSNEDO Jefferson PA-C  05/09/22 1130

## 2022-05-09 NOTE — ED TRIAGE NOTES
Pt presents to ED c/o abdominal pain, sharp pain on RUQ where she has a hernia  Pt states she has been taking tizanidine and has never had blood work done while taking this medication.   Pt states N/V, only able to eat crackers and drink water since Wednesday

## 2022-05-11 ENCOUNTER — OFFICE VISIT (OUTPATIENT)
Dept: SURGERY | Age: 47
End: 2022-05-11
Payer: COMMERCIAL

## 2022-05-11 ENCOUNTER — HOSPITAL ENCOUNTER (OUTPATIENT)
Dept: ULTRASOUND IMAGING | Age: 47
Discharge: HOME | End: 2022-05-13
Payer: COMMERCIAL

## 2022-05-11 VITALS
HEART RATE: 127 BPM | BODY MASS INDEX: 39.37 KG/M2 | HEIGHT: 66 IN | WEIGHT: 245 LBS | OXYGEN SATURATION: 98 % | TEMPERATURE: 97 F

## 2022-05-11 DIAGNOSIS — R10.13 EPIGASTRIC PAIN: ICD-10-CM

## 2022-05-11 DIAGNOSIS — Z09 FOLLOW-UP EXAMINATION: Primary | ICD-10-CM

## 2022-05-11 DIAGNOSIS — R16.0 LIVER MASS: ICD-10-CM

## 2022-05-11 DIAGNOSIS — R74.8 ELEVATED LIVER ENZYMES: ICD-10-CM

## 2022-05-11 PROCEDURE — 76705 ECHO EXAM OF ABDOMEN: CPT

## 2022-05-11 PROCEDURE — 99215 OFFICE O/P EST HI 40 MIN: CPT | Performed by: NURSE PRACTITIONER

## 2022-05-11 NOTE — LETTER
[address] TRAUMA SURG  [facility] [address]  [place] [address]  Dept: [phone]  Dept Fax: [phone]  Loc: [facility] [place]ola [place]  [address]           RETURN TO WORK STATUS  5/11/2022          These are your [employer] Instructions. It may contain personal and confidential  information about your health. It is up to you to share  these instructions as necessary with your employer(s) as required by their policies for you to return to work. WORK STATUS: Patient states has been ill and unable to work from 5/8/2022 to 5/19/2022.     (PLEASE NOTE: If modified work is not available, this patient is then unable to work for this period of time.)          Jaycee Contreras, APRN - CNP

## 2022-05-11 NOTE — PROGRESS NOTES
TRAUMA/EMERGENCY GENERAL SURGERY CLINIC NOTE    Subjective:  Cristy Colmenares is a 56 y.o. female with PMHx of lumbago, cervical spondylosis, and closed head injury who is her for follow up of her right upper quadrant abdominal pain that have been ongoing and worsening causing her to present to the ED on 5/8/22 who is here for follow up with her gallbladder ultrasound. .      Patient states she has epigastric pain that changes from left to right upper quadrants intermittently with burning and occasional nausea. Has been taking bentyl daily twice per day with improved pain. (+) belching. Patient states pain is not related to eating and does not worsen or improve postprandial. No emesis. Rates pain 3-4/10. Has been following a bland diet, taking probiotics, and having regular bowel movements. Denies any urinary symptoms. Overall she feels the current medications are hekping      Vitals:    05/11/22 1332   Pulse: 127   Temp: 97 °F (36.1 °C)   SpO2: 98%         Physical Exam:  Gen: Alert, well developed, NAD  CV: RRR, S1, S2. Normal pulses. Pulm: Non labored respirations, on room air. ABD: Soft, nondistended. Tender to palpation epigastric and mid right abdomen. No guarding or rebound. No palpable hernia  Neuro: No focal deficits, Ox3, appropriate  Ext: no edema, warm and dry       Medications:  Current Outpatient Medications on File Prior to Visit   Medication Sig Dispense Refill    dicyclomine (BENTYL) 10 MG capsule Take 1 capsule by mouth every 6 hours as needed (abdominal pain) 20 capsule 0    ondansetron (ZOFRAN ODT) 4 MG disintegrating tablet Take 1 tablet by mouth every 8 hours as needed for Nausea 20 tablet 0    pantoprazole (PROTONIX) 20 MG tablet Take 2 tablets by mouth daily 60 tablet 0    gabapentin (NEURONTIN) 300 MG capsule Take 4 capsules by mouth 2 times daily for 30 days.  240 capsule 0    methylphenidate (RITALIN) 10 MG tablet Take one tablet in the morning and one tablet at 2 pm. 60 tablet 0    etonogestrel-ethinyl estradiol (NUVARING) 0.12-0.015 MG/24HR vaginal ring Vaginal: One ring, inserted vaginally and left in place continuously for 3 consecutive weeks, then removed for 1 week. A new ring is inserted 7 days after the last was removed 3 each 3    busPIRone (BUSPAR) 5 MG tablet Take 1 tablet by mouth 2 times daily 60 tablet 2    cyclobenzaprine (FLEXERIL) 10 MG tablet Take 10 mg by mouth 2 times daily as needed for Muscle spasms        No current facility-administered medications on file prior to visit. Labs:  No new labs      Studies:  US Gallbladder 5/9/2022:  1. A heterogeneous area is seen in the superior aspect of the right lobe of the liver. Recommend MRI for further evaluation. 2.  No evidence of cholecystitis or Cholelithiasis. Small polyp is seen in the gallbladder wall      Assessment:  Timothy Prabhakar is a 56 y.o. female with PMHx of lumbago, cervical spondylosis, and closed head injury who is her for follow up of her right upper quadrant abdominal pain that have been ongoing and worsening causing her to present to the ED on 5/8/22 who is here for follow up with her gallbladder ultrasound. Given patient exam, imaging and HPI, I do not feel this is consistent with acute cholecystitis and do not recommend surgical intervention at this time for laparoscopic cholecystectomy. Plan:  - Recommend MRI of liver for follow up on liver mass with follow up to PCP after MRI complete. (Ordered)  - Recommend repeat US gallbladder in one year  - Follow up with Gastroenterology (GI) as previously scheduled for elevated liver enzymes. - Recommend colonoscopy- can be scheduled with GI  - Recommend follow up with pulmonology regarding soft tissue lesion noted in posterior mediastinum adjacent to distal thoracic esophagus.  (referral placed)  - Follow up with Emergency General Surgery as needed  - Return to work 5/19/22 after previously scheduled follow up appointments        OCTAVIO Molina - [facility]  Emergency General Surgery  [phone] ([phone]) [phone]      Patient was seen and examined with attending Trauma Physician, Dr. Domo Huggins MD.

## 2022-05-11 NOTE — PATIENT INSTRUCTIONS
- Recommend MRI of liver for follow up on heterogeneous region  with follow up to PCP after MRI complete. (Ordered)  - Recommend repeat US gallbladder in one year  - Follow up with Gastroenterology  as previously scheduled for elevated liver enzymes.   - Recommend colonoscopy  - Recommend follow up with pulmonology regarding soft tissue lesion noted in posterior mediastinum adjacent to distal thoracic esophagus.  - Follow up with Emergency General Surgery as needed  - Return to work 5/19/22 after previously scheduled follow up appointments

## 2022-05-14 DIAGNOSIS — F07.81 POST-CONCUSSION SYNDROME: ICD-10-CM

## 2022-05-14 DIAGNOSIS — Z30.44 ENCOUNTER FOR SURVEILLANCE OF VAGINAL RING HORMONAL CONTRACEPTIVE DEVICE: ICD-10-CM

## 2022-05-15 ENCOUNTER — HOSPITAL ENCOUNTER (OUTPATIENT)
Dept: MRI IMAGING | Age: 47
Discharge: HOME | End: 2022-05-17
Payer: COMMERCIAL

## 2022-05-15 DIAGNOSIS — G62.9 NEUROPATHY: ICD-10-CM

## 2022-05-15 PROCEDURE — 73218 MRI UPPER EXTREMITY W/O DYE: CPT

## 2022-05-16 ENCOUNTER — OFFICE VISIT (OUTPATIENT)
Dept: GASTROENTEROLOGY | Age: 47
End: 2022-05-16
Payer: COMMERCIAL

## 2022-05-16 VITALS
WEIGHT: 244 LBS | HEIGHT: 66 IN | BODY MASS INDEX: 39.21 KG/M2 | SYSTOLIC BLOOD PRESSURE: 110 MMHG | OXYGEN SATURATION: 98 % | DIASTOLIC BLOOD PRESSURE: 68 MMHG | HEART RATE: 118 BPM

## 2022-05-16 DIAGNOSIS — R79.89 ABNORMAL LFTS: Primary | ICD-10-CM

## 2022-05-16 PROCEDURE — 99204 OFFICE O/P NEW MOD 45 MIN: CPT | Performed by: INTERNAL MEDICINE

## 2022-05-16 RX ORDER — PANTOPRAZOLE SODIUM 20 MG/1
40 TABLET, DELAYED RELEASE ORAL DAILY
Qty: 60 TABLET | Refills: 0 | Status: SHIPPED | OUTPATIENT
Start: 2022-05-16

## 2022-05-16 RX ORDER — BUSPIRONE HYDROCHLORIDE 5 MG/1
5 TABLET ORAL 2 TIMES DAILY
Qty: 60 TABLET | Refills: 2 | Status: SHIPPED | OUTPATIENT
Start: 2022-05-16

## 2022-05-16 RX ORDER — DICYCLOMINE HYDROCHLORIDE 10 MG/1
10 CAPSULE ORAL 3 TIMES DAILY PRN
Qty: 90 CAPSULE | Refills: 1 | Status: SHIPPED | OUTPATIENT
Start: 2022-05-16

## 2022-05-16 RX ORDER — GABAPENTIN 300 MG/1
1200 CAPSULE ORAL 2 TIMES DAILY
Qty: 240 CAPSULE | Refills: 0 | Status: SHIPPED | OUTPATIENT
Start: 2022-05-16 | End: 2022-06-16 | Stop reason: SDUPTHER

## 2022-05-16 RX ORDER — ETONOGESTREL AND ETHINYL ESTRADIOL 11.7; 2.7 MG/1; MG/1
INSERT, EXTENDED RELEASE VAGINAL
Qty: 3 EACH | Refills: 3 | Status: SHIPPED | OUTPATIENT
Start: 2022-05-16 | End: 2022-06-16 | Stop reason: SDUPTHER

## 2022-05-16 RX ORDER — METHYLPHENIDATE HYDROCHLORIDE 10 MG/1
TABLET ORAL
Qty: 60 TABLET | Refills: 0 | Status: SHIPPED | OUTPATIENT
Start: 2022-05-16 | End: 2022-06-30 | Stop reason: SDUPTHER

## 2022-05-16 ASSESSMENT — ENCOUNTER SYMPTOMS
CHEST TIGHTNESS: 0
SHORTNESS OF BREATH: 0
NAUSEA: 1
DIARRHEA: 0
ABDOMINAL DISTENTION: 0
WHEEZING: 0
BLOOD IN STOOL: 0
VOICE CHANGE: 0
CONSTIPATION: 0
RECTAL PAIN: 0
PHOTOPHOBIA: 0
EYE PAIN: 0
COLOR CHANGE: 0
ABDOMINAL PAIN: 1
VOMITING: 1
TROUBLE SWALLOWING: 0
EYE REDNESS: 0

## 2022-05-16 NOTE — TELEPHONE ENCOUNTER
Patient is requesting medication refill.  Please approve or deny this request.    Rx requested:  Requested Prescriptions     Pending Prescriptions Disp Refills    busPIRone (BUSPAR) 5 MG tablet 60 tablet 2     Sig: Take 1 tablet by mouth 2 times daily         Last Office Visit:   11/11/2021      Next Visit Date:  Future Appointments   Date Time Provider Marleny Ta   5/16/2022  2:00 PM Meme Bravo MD [address]   5/31/2022  1:00 PM Meghan Buck MD [facility]   6/7/2022  2:30 PM Juan Nolasco MD [facility]   7/19/2022  3:15 PM Debbie Serna MD [facility]   7/26/2022 10:45 AM Meghan Buck MD [address] [facility]
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
## 2022-05-16 NOTE — TELEPHONE ENCOUNTER
Patient is requesting medication refill. Please approve or deny this request.    Rx requested:  Requested Prescriptions     Pending Prescriptions Disp Refills    gabapentin (NEURONTIN) 300 MG capsule 240 capsule 0     Sig: Take 4 capsules by mouth 2 times daily for 30 days.     methylphenidate (RITALIN) 10 MG tablet 60 tablet 0     Sig: Take one tablet in the morning and one tablet at 2 pm.         Last Office Visit:   5/12/2021      Next Visit Date:  Future Appointments   Date Time Provider Marleny Ta   5/16/2022  2:00 PM Cherise Rust MD [address]   5/31/2022  1:00 PM Ness Wheeler MD [facility]   6/7/2022  2:30 PM Berwyn Cushing, MD [facility]   7/19/2022  3:15 PM Ena Hewitt MD [facility]   7/26/2022 10:45 AM Ness Wheeler MD [id number] [facility]

## 2022-05-24 ENCOUNTER — HOSPITAL ENCOUNTER (OUTPATIENT)
Dept: MRI IMAGING | Age: 47
Discharge: HOME | End: 2022-05-26
Payer: COMMERCIAL

## 2022-05-24 DIAGNOSIS — G62.9 NEUROPATHY: ICD-10-CM

## 2022-05-24 PROCEDURE — 73221 MRI JOINT UPR EXTREM W/O DYE: CPT

## 2022-05-27 ENCOUNTER — HOSPITAL ENCOUNTER (OUTPATIENT)
Dept: MRI IMAGING | Age: 47
Discharge: HOME | End: 2022-05-29

## 2022-05-27 DIAGNOSIS — R16.0 LIVER MASS: ICD-10-CM

## 2022-05-31 ENCOUNTER — OFFICE VISIT (OUTPATIENT)
Dept: FAMILY MEDICINE CLINIC | Age: 47
End: 2022-05-31
Payer: COMMERCIAL

## 2022-05-31 VITALS
HEART RATE: 117 BPM | WEIGHT: 242.8 LBS | BODY MASS INDEX: 39.02 KG/M2 | OXYGEN SATURATION: 98 % | HEIGHT: 66 IN | SYSTOLIC BLOOD PRESSURE: 130 MMHG | TEMPERATURE: 97.7 F | DIASTOLIC BLOOD PRESSURE: 76 MMHG

## 2022-05-31 DIAGNOSIS — M51.36 BULGING LUMBAR DISC: Primary | ICD-10-CM

## 2022-05-31 DIAGNOSIS — M76.30 ILIOTIBIAL BAND SYNDROME, UNSPECIFIED LATERALITY: ICD-10-CM

## 2022-05-31 DIAGNOSIS — F07.81 POST-CONCUSSION SYNDROME: ICD-10-CM

## 2022-05-31 DIAGNOSIS — F32.A ANXIETY AND DEPRESSION: ICD-10-CM

## 2022-05-31 DIAGNOSIS — M48.02 FORAMINAL STENOSIS OF CERVICAL REGION: ICD-10-CM

## 2022-05-31 DIAGNOSIS — M47.812 CERVICAL SPONDYLOSIS: ICD-10-CM

## 2022-05-31 DIAGNOSIS — Z13.1 DIABETES MELLITUS SCREENING: ICD-10-CM

## 2022-05-31 DIAGNOSIS — F41.9 ANXIETY AND DEPRESSION: ICD-10-CM

## 2022-05-31 DIAGNOSIS — Z13.220 LIPID SCREENING: ICD-10-CM

## 2022-05-31 PROCEDURE — 99214 OFFICE O/P EST MOD 30 MIN: CPT | Performed by: FAMILY MEDICINE

## 2022-05-31 SDOH — ECONOMIC STABILITY: FOOD INSECURITY: WITHIN THE PAST 12 MONTHS, YOU WORRIED THAT YOUR FOOD WOULD RUN OUT BEFORE YOU GOT MONEY TO BUY MORE.: NEVER TRUE

## 2022-05-31 SDOH — ECONOMIC STABILITY: FOOD INSECURITY: WITHIN THE PAST 12 MONTHS, THE FOOD YOU BOUGHT JUST DIDN'T LAST AND YOU DIDN'T HAVE MONEY TO GET MORE.: NEVER TRUE

## 2022-05-31 ASSESSMENT — PATIENT HEALTH QUESTIONNAIRE - PHQ9
7. TROUBLE CONCENTRATING ON THINGS, SUCH AS READING THE NEWSPAPER OR WATCHING TELEVISION: 0
8. MOVING OR SPEAKING SO SLOWLY THAT OTHER PEOPLE COULD HAVE NOTICED. OR THE OPPOSITE, BEING SO FIGETY OR RESTLESS THAT YOU HAVE BEEN MOVING AROUND A LOT MORE THAN USUAL: 0
SUM OF ALL RESPONSES TO PHQ QUESTIONS 1-9: 0
4. FEELING TIRED OR HAVING LITTLE ENERGY: 0
3. TROUBLE FALLING OR STAYING ASLEEP: 0
2. FEELING DOWN, DEPRESSED OR HOPELESS: 0
SUM OF ALL RESPONSES TO PHQ QUESTIONS 1-9: 0
SUM OF ALL RESPONSES TO PHQ QUESTIONS 1-9: 0
1. LITTLE INTEREST OR PLEASURE IN DOING THINGS: 0
7. TROUBLE CONCENTRATING ON THINGS, SUCH AS READING THE NEWSPAPER OR WATCHING TELEVISION: 0
9. THOUGHTS THAT YOU WOULD BE BETTER OFF DEAD, OR OF HURTING YOURSELF: 0
SUM OF ALL RESPONSES TO PHQ QUESTIONS 1-9: 0
SUM OF ALL RESPONSES TO PHQ QUESTIONS 1-9: 0
5. POOR APPETITE OR OVEREATING: 0
2. FEELING DOWN, DEPRESSED OR HOPELESS: 0
SUM OF ALL RESPONSES TO PHQ QUESTIONS 1-9: 0
SUM OF ALL RESPONSES TO PHQ QUESTIONS 1-9: 0
9. THOUGHTS THAT YOU WOULD BE BETTER OFF DEAD, OR OF HURTING YOURSELF: 0
4. FEELING TIRED OR HAVING LITTLE ENERGY: 0
10. IF YOU CHECKED OFF ANY PROBLEMS, HOW DIFFICULT HAVE THESE PROBLEMS MADE IT FOR YOU TO DO YOUR WORK, TAKE CARE OF THINGS AT HOME, OR GET ALONG WITH OTHER PEOPLE: 0
8. MOVING OR SPEAKING SO SLOWLY THAT OTHER PEOPLE COULD HAVE NOTICED. OR THE OPPOSITE, BEING SO FIGETY OR RESTLESS THAT YOU HAVE BEEN MOVING AROUND A LOT MORE THAN USUAL: 0
3. TROUBLE FALLING OR STAYING ASLEEP: 0
SUM OF ALL RESPONSES TO PHQ9 QUESTIONS 1 & 2: 0
5. POOR APPETITE OR OVEREATING: 0
10. IF YOU CHECKED OFF ANY PROBLEMS, HOW DIFFICULT HAVE THESE PROBLEMS MADE IT FOR YOU TO DO YOUR WORK, TAKE CARE OF THINGS AT HOME, OR GET ALONG WITH OTHER PEOPLE: 0
1. LITTLE INTEREST OR PLEASURE IN DOING THINGS: 0
SUM OF ALL RESPONSES TO PHQ9 QUESTIONS 1 & 2: 0
6. FEELING BAD ABOUT YOURSELF - OR THAT YOU ARE A FAILURE OR HAVE LET YOURSELF OR YOUR FAMILY DOWN: 0
6. FEELING BAD ABOUT YOURSELF - OR THAT YOU ARE A FAILURE OR HAVE LET YOURSELF OR YOUR FAMILY DOWN: 0
SUM OF ALL RESPONSES TO PHQ QUESTIONS 1-9: 0

## 2022-05-31 ASSESSMENT — SOCIAL DETERMINANTS OF HEALTH (SDOH): HOW HARD IS IT FOR YOU TO PAY FOR THE VERY BASICS LIKE FOOD, HOUSING, MEDICAL CARE, AND HEATING?: NOT HARD AT ALL
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
## 2022-06-07 ENCOUNTER — INITIAL CONSULT (OUTPATIENT)
Dept: NEUROSURGERY | Age: 47
End: 2022-06-07
Payer: COMMERCIAL

## 2022-06-07 VITALS
SYSTOLIC BLOOD PRESSURE: 120 MMHG | WEIGHT: 237 LBS | TEMPERATURE: 97.2 F | DIASTOLIC BLOOD PRESSURE: 76 MMHG | HEIGHT: 66 IN | BODY MASS INDEX: 38.09 KG/M2

## 2022-06-07 DIAGNOSIS — M50.30 DEGENERATIVE DISC DISEASE, CERVICAL: ICD-10-CM

## 2022-06-07 DIAGNOSIS — M47.812 CERVICAL SPONDYLOSIS WITHOUT MYELOPATHY: Primary | ICD-10-CM

## 2022-06-07 PROCEDURE — 99203 OFFICE O/P NEW LOW 30 MIN: CPT | Performed by: NEUROLOGICAL SURGERY

## 2022-06-07 ASSESSMENT — ENCOUNTER SYMPTOMS
NAUSEA: 0
SHORTNESS OF BREATH: 0
EYE PAIN: 0
BACK PAIN: 1

## 2022-06-08 ENCOUNTER — OFFICE VISIT (OUTPATIENT)
Dept: PULMONOLOGY | Age: 47
End: 2022-06-08
Payer: COMMERCIAL

## 2022-06-08 VITALS
HEIGHT: 66 IN | WEIGHT: 229 LBS | DIASTOLIC BLOOD PRESSURE: 78 MMHG | BODY MASS INDEX: 36.8 KG/M2 | OXYGEN SATURATION: 98 % | HEART RATE: 126 BPM | SYSTOLIC BLOOD PRESSURE: 122 MMHG | TEMPERATURE: 97.6 F

## 2022-06-08 DIAGNOSIS — G47.30 SLEEP-DISORDERED BREATHING: ICD-10-CM

## 2022-06-08 DIAGNOSIS — R91.1 LUNG NODULE: Primary | ICD-10-CM

## 2022-06-08 PROCEDURE — 99203 OFFICE O/P NEW LOW 30 MIN: CPT | Performed by: INTERNAL MEDICINE
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
## 2022-06-14 ENCOUNTER — HOSPITAL ENCOUNTER (OUTPATIENT)
Dept: MRI IMAGING | Age: 47
Discharge: HOME | End: 2022-06-16
Payer: COMMERCIAL

## 2022-06-14 DIAGNOSIS — R16.0 LIVER MASS: ICD-10-CM

## 2022-06-14 PROCEDURE — 74183 MRI ABD W/O CNTR FLWD CNTR: CPT

## 2022-06-14 PROCEDURE — 6360000004 HC RX CONTRAST MEDICATION: Performed by: NURSE PRACTITIONER

## 2022-06-14 PROCEDURE — A9577 INJ MULTIHANCE: HCPCS | Performed by: NURSE PRACTITIONER

## 2022-06-14 RX ADMIN — GADOBENATE DIMEGLUMINE 20 ML: 529 INJECTION, SOLUTION INTRAVENOUS at 10:59

## 2022-06-15 ENCOUNTER — HOSPITAL ENCOUNTER (OUTPATIENT)
Dept: PHYSICAL THERAPY | Age: 47
Setting detail: THERAPIES SERIES
Discharge: HOME | End: 2022-06-15
Payer: COMMERCIAL

## 2022-06-15 ENCOUNTER — PATIENT MESSAGE (OUTPATIENT)
Dept: NEUROLOGY | Age: 47
End: 2022-06-15

## 2022-06-15 PROCEDURE — 97162 PT EVAL MOD COMPLEX 30 MIN: CPT

## 2022-06-15 PROCEDURE — 97110 THERAPEUTIC EXERCISES: CPT

## 2022-06-15 ASSESSMENT — PAIN DESCRIPTION - DESCRIPTORS: DESCRIPTORS: ACHING;DULL;SHOOTING

## 2022-06-15 ASSESSMENT — PAIN DESCRIPTION - ORIENTATION: ORIENTATION: RIGHT

## 2022-06-15 ASSESSMENT — PAIN DESCRIPTION - ONSET: ONSET: AWAKENED FROM SLEEP

## 2022-06-15 ASSESSMENT — PAIN DESCRIPTION - LOCATION: LOCATION: SHOULDER

## 2022-06-15 ASSESSMENT — PAIN DESCRIPTION - PAIN TYPE: TYPE: ACUTE PAIN

## 2022-06-15 ASSESSMENT — PAIN SCALES - GENERAL: PAINLEVEL_OUTOF10: 4

## 2022-06-15 ASSESSMENT — PAIN DESCRIPTION - FREQUENCY: FREQUENCY: CONTINUOUS
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
## 2022-06-16 ENCOUNTER — TELEPHONE (OUTPATIENT)
Dept: NEUROLOGY | Age: 47
End: 2022-06-16

## 2022-06-16 DIAGNOSIS — F07.81 POST-CONCUSSION SYNDROME: ICD-10-CM

## 2022-06-16 DIAGNOSIS — Z30.44 ENCOUNTER FOR SURVEILLANCE OF VAGINAL RING HORMONAL CONTRACEPTIVE DEVICE: ICD-10-CM

## 2022-06-17 ENCOUNTER — HOSPITAL ENCOUNTER (OUTPATIENT)
Dept: LAB | Age: 47
Discharge: HOME | End: 2022-06-17
Payer: COMMERCIAL

## 2022-06-17 DIAGNOSIS — Z13.1 DIABETES MELLITUS SCREENING: ICD-10-CM

## 2022-06-17 DIAGNOSIS — Z13.220 LIPID SCREENING: ICD-10-CM

## 2022-06-17 DIAGNOSIS — R79.89 ABNORMAL LFTS: ICD-10-CM

## 2022-06-17 LAB
ALBUMIN SERPL-MCNC: 4 G/DL (ref 3.5–4.6)
ALP BLD-CCNC: 150 U/L (ref 40–130)
ALT SERPL-CCNC: 30 U/L (ref 0–33)
ANION GAP SERPL CALCULATED.3IONS-SCNC: 17 MEQ/L (ref 9–15)
AST SERPL-CCNC: 19 U/L (ref 0–35)
BILIRUB SERPL-MCNC: 0.3 MG/DL (ref 0.2–0.7)
BUN BLDV-MCNC: 13 MG/DL (ref 6–20)
CALCIUM SERPL-MCNC: 9.1 MG/DL (ref 8.5–9.9)
CHLORIDE BLD-SCNC: 101 MEQ/L (ref 95–107)
CHOLESTEROL, FASTING: 234 MG/DL (ref 0–199)
CO2: 18 MEQ/L (ref 20–31)
CREAT SERPL-MCNC: 0.63 MG/DL (ref 0.5–0.9)
GFR AFRICAN AMERICAN: >60
GFR NON-AFRICAN AMERICAN: >60
GLOBULIN: 3.3 G/DL (ref 2.3–3.5)
GLUCOSE BLD-MCNC: 281 MG/DL (ref 70–99)
GLUCOSE FASTING: 279 MG/DL (ref 70–99)
HCT VFR BLD CALC: 42.6 % (ref 37–47)
HDLC SERPL-MCNC: 60 MG/DL (ref 40–59)
HEMOGLOBIN: 13.9 G/DL (ref 12–16)
HEPATITIS B SURFACE ANTIGEN INTERPRETATION: NORMAL
LDL CHOLESTEROL CALCULATED: 131 MG/DL (ref 0–129)
MCH RBC QN AUTO: 26 PG (ref 27–31.3)
MCHC RBC AUTO-ENTMCNC: 32.7 % (ref 33–37)
MCV RBC AUTO: 79.4 FL (ref 82–100)
PDW BLD-RTO: 16.2 % (ref 11.5–14.5)
PLATELET # BLD: 245 K/UL (ref 130–400)
POTASSIUM SERPL-SCNC: 4.2 MEQ/L (ref 3.4–4.9)
RBC # BLD: 5.36 M/UL (ref 4.2–5.4)
SODIUM BLD-SCNC: 136 MEQ/L (ref 135–144)
TOTAL PROTEIN: 7.3 G/DL (ref 6.3–8)
TRIGLYCERIDE, FASTING: 217 MG/DL (ref 0–150)
WBC # BLD: 6.7 K/UL (ref 4.8–10.8)

## 2022-06-17 PROCEDURE — 86376 MICROSOMAL ANTIBODY EACH: CPT

## 2022-06-17 PROCEDURE — 86039 ANTINUCLEAR ANTIBODIES (ANA): CPT

## 2022-06-17 PROCEDURE — 82784 ASSAY IGA/IGD/IGG/IGM EACH: CPT

## 2022-06-17 PROCEDURE — 82947 ASSAY GLUCOSE BLOOD QUANT: CPT

## 2022-06-17 PROCEDURE — 86803 HEPATITIS C AB TEST: CPT

## 2022-06-17 PROCEDURE — 86708 HEPATITIS A ANTIBODY: CPT

## 2022-06-17 PROCEDURE — 86704 HEP B CORE ANTIBODY TOTAL: CPT

## 2022-06-17 PROCEDURE — 36415 COLL VENOUS BLD VENIPUNCTURE: CPT

## 2022-06-17 PROCEDURE — 83516 IMMUNOASSAY NONANTIBODY: CPT

## 2022-06-17 PROCEDURE — 80053 COMPREHEN METABOLIC PANEL: CPT

## 2022-06-17 PROCEDURE — 82103 ALPHA-1-ANTITRYPSIN TOTAL: CPT

## 2022-06-17 PROCEDURE — 86709 HEPATITIS A IGM ANTIBODY: CPT

## 2022-06-17 PROCEDURE — 80061 LIPID PANEL: CPT

## 2022-06-17 PROCEDURE — 85027 COMPLETE CBC AUTOMATED: CPT

## 2022-06-17 PROCEDURE — 82104 ALPHA-1-ANTITRYPSIN PHENO: CPT

## 2022-06-17 PROCEDURE — 87340 HEPATITIS B SURFACE AG IA: CPT

## 2022-06-17 RX ORDER — ETONOGESTREL AND ETHINYL ESTRADIOL 11.7; 2.7 MG/1; MG/1
INSERT, EXTENDED RELEASE VAGINAL
Qty: 3 EACH | Refills: 3 | Status: SHIPPED | OUTPATIENT
Start: 2022-06-17 | End: 2022-10-10 | Stop reason: SDUPTHER

## 2022-06-17 RX ORDER — GABAPENTIN 300 MG/1
1200 CAPSULE ORAL 2 TIMES DAILY
Qty: 240 CAPSULE | Refills: 0 | Status: SHIPPED | OUTPATIENT
Start: 2022-06-17 | End: 2022-09-22 | Stop reason: SDUPTHER

## 2022-06-17 NOTE — TELEPHONE ENCOUNTER
Patient is requesting medication refill. Please approve or deny this request.    Rx requested:  Requested Prescriptions     Pending Prescriptions Disp Refills    gabapentin (NEURONTIN) 300 MG capsule 240 capsule 0     Sig: Take 4 capsules by mouth 2 times daily for 30 days.          Last Office Visit:   11/11/2021      Next Visit Date:  Future Appointments   Date Time Provider Marleny Ta   6/21/2022 10:30 AM Shannon Samuels PSYD MLOX  [place]   6/21/2022  1:45 PM Sue Stovall MD [address]   6/22/2022  5:94 PM [facility], PTA [address] [facility]   6/24/2022  3:73 PM [facility], PTA [address]   7/19/2022  3:15 PM Laina Arceo MD [facility]   11/29/2022 11:00 AM Jaime Dash  [place], [place] [address]

## 2022-06-18 LAB
HAV IGM SER IA-ACNC: NONREACTIVE
HEPATITIS C ANTIBODY: NONREACTIVE

## 2022-06-19 LAB
HAV AB SERPL IA-ACNC: NEGATIVE
HEPATITIS B CORE TOTAL ANTIBODY: NEGATIVE
IGA: 228 MG/DL (ref 68–408)
IGG: 1026 MG/DL (ref 768–1632)
IGM: 178 MG/DL (ref 35–263)

## 2022-06-20 LAB — LIVER-KIDNEY MICROSOME-1 AB IGG: 1.1 U (ref 0–24.9)

## 2022-06-21 ENCOUNTER — OFFICE VISIT (OUTPATIENT)
Dept: BEHAVIORAL/MENTAL HEALTH CLINIC | Age: 47
End: 2022-06-21
Payer: COMMERCIAL

## 2022-06-21 ENCOUNTER — OFFICE VISIT (OUTPATIENT)
Dept: GASTROENTEROLOGY | Age: 47
End: 2022-06-21
Payer: COMMERCIAL

## 2022-06-21 VITALS
HEART RATE: 112 BPM | BODY MASS INDEX: 38.41 KG/M2 | WEIGHT: 238 LBS | DIASTOLIC BLOOD PRESSURE: 80 MMHG | OXYGEN SATURATION: 96 % | SYSTOLIC BLOOD PRESSURE: 134 MMHG

## 2022-06-21 DIAGNOSIS — F32.A DEPRESSION, UNSPECIFIED DEPRESSION TYPE: ICD-10-CM

## 2022-06-21 DIAGNOSIS — K76.9 LIVER LESION: ICD-10-CM

## 2022-06-21 DIAGNOSIS — Z12.11 COLON CANCER SCREENING: Primary | ICD-10-CM

## 2022-06-21 DIAGNOSIS — F43.10 PTSD (POST-TRAUMATIC STRESS DISORDER): Primary | ICD-10-CM

## 2022-06-21 LAB — F-ACTIN AB IGA: 8.3 UNITS (ref 0–24.9)

## 2022-06-21 PROCEDURE — 99214 OFFICE O/P EST MOD 30 MIN: CPT | Performed by: INTERNAL MEDICINE

## 2022-06-21 PROCEDURE — 90791 PSYCH DIAGNOSTIC EVALUATION: CPT | Performed by: PSYCHOLOGIST

## 2022-06-21 RX ORDER — SODIUM, POTASSIUM,MAG SULFATES 17.5-3.13G
SOLUTION, RECONSTITUTED, ORAL ORAL
Qty: 1 EACH | Refills: 0 | Status: SHIPPED | OUTPATIENT
Start: 2022-06-21

## 2022-06-21 ASSESSMENT — PATIENT HEALTH QUESTIONNAIRE - PHQ9
9. THOUGHTS THAT YOU WOULD BE BETTER OFF DEAD, OR OF HURTING YOURSELF: 0
SUM OF ALL RESPONSES TO PHQ QUESTIONS 1-9: 6
7. TROUBLE CONCENTRATING ON THINGS, SUCH AS READING THE NEWSPAPER OR WATCHING TELEVISION: 1
3. TROUBLE FALLING OR STAYING ASLEEP: 2
1. LITTLE INTEREST OR PLEASURE IN DOING THINGS: 0
SUM OF ALL RESPONSES TO PHQ QUESTIONS 1-9: 6
2. FEELING DOWN, DEPRESSED OR HOPELESS: 1
5. POOR APPETITE OR OVEREATING: 0
SUM OF ALL RESPONSES TO PHQ9 QUESTIONS 1 & 2: 1
8. MOVING OR SPEAKING SO SLOWLY THAT OTHER PEOPLE COULD HAVE NOTICED. OR THE OPPOSITE, BEING SO FIGETY OR RESTLESS THAT YOU HAVE BEEN MOVING AROUND A LOT MORE THAN USUAL: 0
4. FEELING TIRED OR HAVING LITTLE ENERGY: 2
SUM OF ALL RESPONSES TO PHQ QUESTIONS 1-9: 6
10. IF YOU CHECKED OFF ANY PROBLEMS, HOW DIFFICULT HAVE THESE PROBLEMS MADE IT FOR YOU TO DO YOUR WORK, TAKE CARE OF THINGS AT HOME, OR GET ALONG WITH OTHER PEOPLE: 1
6. FEELING BAD ABOUT YOURSELF - OR THAT YOU ARE A FAILURE OR HAVE LET YOURSELF OR YOUR FAMILY DOWN: 0
SUM OF ALL RESPONSES TO PHQ QUESTIONS 1-9: 6

## 2022-06-21 ASSESSMENT — ENCOUNTER SYMPTOMS
SHORTNESS OF BREATH: 0
NAUSEA: 0
PHOTOPHOBIA: 0
EYE PAIN: 0
CHEST TIGHTNESS: 0
EYE REDNESS: 0
BLOOD IN STOOL: 0
ABDOMINAL DISTENTION: 0
RECTAL PAIN: 0
VOICE CHANGE: 0
DIARRHEA: 0
COLOR CHANGE: 0
WHEEZING: 0
VOMITING: 0
CONSTIPATION: 1
ABDOMINAL PAIN: 1
TROUBLE SWALLOWING: 0

## 2022-06-21 ASSESSMENT — ANXIETY QUESTIONNAIRES
2. NOT BEING ABLE TO STOP OR CONTROL WORRYING: 1
3. WORRYING TOO MUCH ABOUT DIFFERENT THINGS: 1
1. FEELING NERVOUS, ANXIOUS, OR ON EDGE: 1
5. BEING SO RESTLESS THAT IT IS HARD TO SIT STILL: 0
4. TROUBLE RELAXING: 0
GAD7 TOTAL SCORE: 4
6. BECOMING EASILY ANNOYED OR IRRITABLE: 1
IF YOU CHECKED OFF ANY PROBLEMS ON THIS QUESTIONNAIRE, HOW DIFFICULT HAVE THESE PROBLEMS MADE IT FOR YOU TO DO YOUR WORK, TAKE CARE OF THINGS AT HOME, OR GET ALONG WITH OTHER PEOPLE: SOMEWHAT DIFFICULT
7. FEELING AFRAID AS IF SOMETHING AWFUL MIGHT HAPPEN: 0
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
## 2022-06-21 NOTE — PATIENT INSTRUCTIONS
Yael and [phone]          [facility] [address]:    Emergency or crisis issues for mental health concerns should be handled through the 24-Hour Hotline  99 766785    A new crisis text resource available for Debbie: Text 4HOPE to 521402 and get a reply from a trained Crisis Counselor    You can identify providers or availability of services at the [address] Non-Emergency Navigator: [address]:  Mel Peter  1910 [facility],   [address]  [address] is moving 2/15/18:  New location: [facility] 62, 0[facility], [address] [facility] [address]  [facility]  [phone]  Jada Sofie:  [place]  [phone]  [address] [place] [address]. Francisac Dong  [facility]  [phone]    AGENCIES SERVING ADULTS    AMHERST:  [facility]  [phone]  Mel Peter  [phone]  [place]RI[place]:  [facility]  [phone]  [place]:  [place]  [phone]  100 [facility]. [address]  56  [address] (two locations)  [address] 43, [address] [place] [address]  [phone]  (Services include: Psychiatry, adult & pediatrics, therapy, evaluation, addiction services)    Yunier Villalobos (through the [facility])  [phone]    The Revistronicnie Sleek is a peer resource available Monday through Friday, from 1pm-8pm. The Kinnie Sleek provides a safe, confidential place to talk and be heard. To speak to a , call [phone] and ask to be connected to the [facility] [employer].     [address] [facility] Intensive Outpatient Services (IOP)  [phone]  [address]  Edyta Gonzalez 70  [phone],  [phone]  (Services include: Psychiatry, therapy, evaluation, addiction services)    [facility]  [address] [facility], [address]  [phone]  (Services include: Psychiatry, adult & pediatrics, therapy, evaluation, addiction services)    8 [facility] (2 locations)     133 Mila Wilson [facility] Gomez[address]   [facility], [address] 79  [phone]  (Services include: Psychiatry, adult & pediatrics, therapy, evaluation, addiction services)     [facility]  [address]  [phone]  (Services include: Psychiatry, adult & pediatrics, therapy, evaluation, addiction services)    [facility], 295 [facility], [address] [place] [address]  [phone]  (Services include: PEDIATRIC Psychiatry, family, in home & school based services)    [address] [place] [address], 562 [facility], [address] [place] [address] Road  [phone]  (Services include: [address] Psychiatry, family, in home & school based services)    Intensive Outpatient Services (IOP)    [facility]  To contact the [address] [place], [place] [address]vd program or to make a referral, please call the IOP office at [phone]. We are located at:  Mood Disorders Intensive Outpatient Program  W.O. Walker [address]  [place] [address]. [facility]  For more information about [facility], call [phone]. [address] .[phone]   [facility] Daytime .[phone]   [facility] Afternoon .[phone]   [facility] .[phone]      Psych [address], ESPOO, [address]  Phone: [phone]    Adolescent IOP  Six-Week Program  18 Sessions  Tuesday and Thursday  3:00 to 6:00 p.m. Saturday  10:00 a.m. to 1:00 p.m. Parents join on Saturday    Adult IOP  Six-Week Program  18 Sessions   Monday, Wednesday and Friday  5:30 to 8:30 p.m.     For more information about the IOP services in [place],   please call the Intake Department at [phone]. [facility]:    [address] [facility] [address]   500 [facility] [address]  [place], 1266 [facility], 1266 [facility]  [phone]    [address]:    [facility] (2 locations)    701 [facility].    [facility] 112, [address]     Jameel Dominguez, 901 [facility]  [phone]      Private Providers    [facility] (numerous locations)    General acute hospital (formerly Psych[facility])  [address]       Numerous locations  [facility], [address] [facility] [address]       [address] [place] [address] (2 locations)  901 [facility]    [address] [facility] [address] [facility], [address] [address]  [address] [place] [address] or  [phone]    Dr Emma Carolina, [facility] 42  [address] [place] [address] [facility]  [phone]    [facility] Psychiatry  Numerous locations  and virtual appointments  [phone]  [email or web address]    Dr. Oleksandr Enciso  [address] [facility] [address] [place] [address]  [phone]    Dr. Theresa Joshi and Dr. Raul Martino     [address] [place], [place] [address]e [address]     [phone]         Substance Abuse Resources    Alcoholics Anonymous   Narcotic Anonymous  www.a[place]aincounty. org   www.naohio. org  [phone]     [place] (numerous locations)  www.smartrecovery. org   Toni Craven 91   [phone]    [facility], [address] [facility]         [phone] 64 [address]   [address]  [place], [address] [place] [address], [address] [place] [address]  [phone]    Road to [place] [address]  [phone]    Emergency or crisis issues for substance abuse or addiction should be handled through the 24-Hour Hotline (340) 160-7307 or  1701 77 18 61 on Mental Illness Www.august. Sonya Robledo of [facility]     [address], [address] Ariel Francisco, [address] [facility]   [phone]    [facility] on Aging  http://lcooa. org  Address: 1 [facility] 270, [facility], [address] [facility]  Phone: [phone]    [facility], [address] [place] [address]  [phone] Toll-Free   Administrative/Helpline  (345) [phone] Voice   Administrative/Helpline  ([phone] Voice   24-Hour Helpline  http://www. Baydiner. org  Service description: Provides weekly domestic violence support groups to educate, support and assist women experiencing domestic violence and other similar situations. Offers a support group specific to LGBTQI. Intake procedure: Call the main office for meeting times and locations. Fees:Free. Eligibility :Serves victims of domestic violence in [facility]. Hours: Varies. Call for details.  RESPITE    The [address] [address] Shari Francisco, [address] [facility] [address]  Phone: [phone]    [email or web address].Ziffi/    4421 [facility] Crisis Hotline:  2-609.479.4768    National Suicide Prevention Lifeline:  (934) 102-ZGRS (6279)  www.suicidepreventionlifeline. [address] [facility] Hotline:  (308) Natalee Moody (614-5615)  Www.youthline. [facility] Financial:  (521) 287-4693 and Press 1  Text 166087  www. veteranscrisisline. net    [address] First Call For Help: dial 211 or go to   www. 211lorain. org for a variety of   community services including assistance  with housing, utilities, food, healthcare, etc.      Vocation and [facility] [address])  The Employment netWork   [address] [phone]     The Employment net[employer], [address] [facility], is a partnership of over twenty agencies and organizations that have joined together to deliver a comprehensive system of high quality, customer-friendly services designed to meet the education, training, employment or supportive service needs of the community. [place] of vocational rehabilitation (BVR)    Debbie is served through the [facility] at:   [facility]in [facility]., [facility] 275 [facility], [address] [facility], [facility]   Voice/TTHARPREET [phone]  [phone]  Toll-free [phone]

## 2022-06-22 ENCOUNTER — HOSPITAL ENCOUNTER (OUTPATIENT)
Dept: PHYSICAL THERAPY | Age: 47
Setting detail: THERAPIES SERIES
Discharge: HOME | End: 2022-06-22
Payer: COMMERCIAL

## 2022-06-22 LAB
ANA PATTERN: ABNORMAL
ANA TITER: ABNORMAL
ANTINUCLEAR AB INTERPRETIVE COMMENT: ABNORMAL
ANTINUCLEAR ANTIBODY, HEP-2, IGG: DETECTED

## 2022-06-22 PROCEDURE — 97140 MANUAL THERAPY 1/> REGIONS: CPT

## 2022-06-22 PROCEDURE — 97110 THERAPEUTIC EXERCISES: CPT

## 2022-06-22 NOTE — PROGRESS NOTES
Pt. very limited by pain and mobility guarded and limited tolerance to therex. Able to tolerate gentle doorway stretches and passive table slides for shd flexion. Guarded with all PROM. Applied KT tape for pain relief. Pt. did not rate pain post.  Body Structures, Functions, Activity Limitations Requiring Skilled Therapeutic Intervention: Decreased functional mobility ,Decreased ROM,Decreased strength,Decreased endurance,Increased pain    Post-Treatment Pain Level: Activity Tolerance: Patient limited by pain    Therapy Prognosis: Good       GOALS   Patient goals :  To get rid of her right UE pain so that she can return to her normal function  Short Term Goals Completed by 1-2 weeks from date of evaluation Current Status Goal Status   Pt reports having 3/10 right shoulder pain with ADLS       Pt reports able to complete her HEP 3-5x per week                                                                           Long Term Goals Completed by 4-6 weeks from date of evaluation Current Status Goal Status   Pt reports having 2/10 or less R UE pain while performing ADLS and work duties       Increase AROM of right shoulder to dhmwkhd=270 degrees, abduciton =140 degrees and IR= able to reach approx L3-4       Increase RUE strength to 4/5 or >so that pt can perform overhead activities without increased pain       Improve her Spadi (Shoulder Pain & Disability Index) from 90% disability on eval to <30% by time of DC       Pt indep with an advanced HEP                                                    TREATMENT PLAN   Plan Frequency: 1-2x per week  Plan weeks: 4-6 weeks  Current Treatment Recommendations: Strengthening,ROM,Functional mobility training,Manual Therapy - Joint Manipulation,Manual Therapy - Soft Tissue Mobilization,Pain management,Home exercise program,Safety education & training,Modalities   Requires PT Follow-Up: Yes  Plan Comment: Estim, US, KT tape, cervical traction, CP, HP       Therapy Time  Individual Time In:       145  Individual Time Out:  230  Minutes:  45        Electronically signed by Vern Barraza PTA  on 2/96/8336 at 3:30 PM   [address]

## 2022-06-24 ENCOUNTER — HOSPITAL ENCOUNTER (OUTPATIENT)
Dept: PHYSICAL THERAPY | Age: 47
Setting detail: THERAPIES SERIES
Discharge: HOME | End: 2022-06-24
Payer: COMMERCIAL

## 2022-06-24 PROCEDURE — 97035 APP MDLTY 1+ULTRASOUND EA 15: CPT

## 2022-06-24 PROCEDURE — 97110 THERAPEUTIC EXERCISES: CPT

## 2022-06-24 PROCEDURE — 97140 MANUAL THERAPY 1/> REGIONS: CPT

## 2022-06-24 ASSESSMENT — PAIN DESCRIPTION - PAIN TYPE: TYPE: ACUTE PAIN

## 2022-06-24 ASSESSMENT — PAIN SCALES - GENERAL: PAINLEVEL_OUTOF10: 4

## 2022-06-24 ASSESSMENT — PAIN DESCRIPTION - ORIENTATION: ORIENTATION: RIGHT

## 2022-06-24 ASSESSMENT — PAIN DESCRIPTION - LOCATION: LOCATION: SHOULDER;ARM

## 2022-06-29 ENCOUNTER — HOSPITAL ENCOUNTER (OUTPATIENT)
Dept: PHYSICAL THERAPY | Age: 47
Setting detail: THERAPIES SERIES
Discharge: HOME | End: 2022-06-29
Payer: COMMERCIAL

## 2022-06-29 DIAGNOSIS — Z30.44 ENCOUNTER FOR SURVEILLANCE OF VAGINAL RING HORMONAL CONTRACEPTIVE DEVICE: ICD-10-CM

## 2022-06-29 DIAGNOSIS — R73.01 IMPAIRED FASTING BLOOD SUGAR: Primary | ICD-10-CM

## 2022-06-29 PROCEDURE — 97140 MANUAL THERAPY 1/> REGIONS: CPT

## 2022-06-29 PROCEDURE — 97110 THERAPEUTIC EXERCISES: CPT

## 2022-06-29 RX ORDER — ETONOGESTREL AND ETHINYL ESTRADIOL 11.7; 2.7 MG/1; MG/1
INSERT, EXTENDED RELEASE VAGINAL
Qty: 3 EACH | Refills: 3 | OUTPATIENT
Start: 2022-06-29

## 2022-06-29 ASSESSMENT — PAIN DESCRIPTION - PAIN TYPE: TYPE: ACUTE PAIN

## 2022-06-29 ASSESSMENT — PAIN DESCRIPTION - LOCATION: LOCATION: SHOULDER;ARM

## 2022-06-29 ASSESSMENT — PAIN DESCRIPTION - FREQUENCY: FREQUENCY: CONTINUOUS

## 2022-06-29 ASSESSMENT — PAIN DESCRIPTION - ORIENTATION: ORIENTATION: RIGHT

## 2022-06-29 ASSESSMENT — PAIN SCALES - GENERAL: PAINLEVEL_OUTOF10: 4

## 2022-06-29 NOTE — PROGRESS NOTES
Physical Therapy: Daily Note   Patient: Mukul Poon (3 y.o. female)   Examination Date:   Plan of Care/Certification Expiration Date: 07/15/22    No data recorded   :  1975 # of Visits since [facility]:   4   MRN: [id number]  CSN: [id number] Start of Care Date:   6/15/2022   Insurance: Payor: [employer] / Plan: [address] [place] [address] / Product Type: *No Product type* /   Insurance ID: [id number] - (Commercial) Secondary Insurance (if applicable):    Referring Physician: Juan M Lucas MD     PCP: Max Mistry MD Visits to Date/Visits Approved:     No Show/Cancelled Appts: 0 / 0     Medical Diagnosis: Pain in unspecified shoulder [M25.519]    Treatment Diagnosis: Right shoulder pain        SUBJECTIVE EXAMINATION   Pain Level: Pain Screening  Patient Currently in Pain: Yes  Pain Assessment: 0-10  Pain Level: 4  Pain Type: Acute pain  Pain Location: Shoulder,Arm  Pain Orientation: Right  Pain Radiating Towards: bicep and tricep  Pain Descriptors: Tightness,Spasm,Sharp,Aching  Pain Frequency: Continuous    Patient Comments: Subjective: Pt. states she was able to tolerate KT this time for 5 days pt. reports however needs something for tricep as cleaning fridge triggored tricep area.     HEP Compliance: Good        OBJECTIVE EXAMINATION   Restrictions:  No data recorded No data recorded No data recorded          TREATMENT     Exercises:      Treatment Reasoning    Exercise 1: Verbally reviewed scap retractions and cap D  Exercise 3: table slides flexion with arms on small roll for a more passive stretch x 10 hold 10 sec  Exercise 4: table slides abduction with arms on small roll for a more passive stretch x 5 hold 10 sec  Exercise 7: IR with towel behind back x 3 reps inc pain deferred                           Manual Therapy: (CPT 95475) Treatment Reasoning     Joint Mobilization: gentle PROM of R shd flexion, abd, ER pt. guarded and limited by  pain all planes does better with slow movement. Scap mobilization and lat strethc tightness noted inferior angle and lateral border, MFR to relieve with tenderness noted at subscapularis  Other: KT tape to R bicep proximal to distal 50% tension to dec tightness horizontal strip x 2 max tension over R bicep triggor points for pain control,  Ant GH to T8 with post and inferior pull 30 % tension for posutral awareness. R flank rib 5-6 to dec tightness medial to lateral 50% tension.                         Pt Education: Plan Comment: Estim, US, KT tape, cervical traction, CP, HP       ASSESSMENT     Assessment: Assessment: Pt. continues to be limited by pain with mobility particularly overhead. Demos tightness in shoulder and scapula with limited AROM noted pain and can only tolerate few reps with exercises this date. Pain 4/10 post session. Applied KT tape to dec pain. Body Structures, Functions, Activity Limitations Requiring Skilled Therapeutic Intervention: Decreased functional mobility ,Decreased ROM,Decreased strength,Decreased endurance,Increased pain    Post-Treatment Pain Level:       Activity Tolerance: Patient limited by pain    Therapy Prognosis: Good       GOALS      Short Term Goals Completed by   Current Status Goal Status                                                                                       Long Term Goals Completed by   Current Status Goal Status                                                                                        TREATMENT PLAN   Plan Frequency: 1-2x per week  Plan weeks: 4-6 weeks  Current Treatment Recommendations: Strengthening,ROM,Functional mobility training,Manual Therapy - Joint Manipulation,Manual Therapy - Soft Tissue Mobilization,Pain management,Home exercise program,Safety education & training,Modalities   Requires PT Follow-Up: Yes  Plan Comment: Estim, US, KT tape, cervical traction, CP, HP       Therapy Time  Individual Time In:       1245  Individual Time Out:  130  Minutes:  45 Electronically signed by Jalen Posada PTA  on 0/13/6563 at 2:37 PM   [address]

## 2022-06-30 DIAGNOSIS — F07.81 POST-CONCUSSION SYNDROME: ICD-10-CM

## 2022-06-30 LAB
ALPHA-1 ANTITRYPSIN PHENOTYPE: NORMAL
ALPHA-1 ANTITRYPSIN: 185 MG/DL (ref 90–200)

## 2022-06-30 RX ORDER — METHYLPHENIDATE HYDROCHLORIDE 10 MG/1
TABLET ORAL
Qty: 60 TABLET | Refills: 0 | Status: SHIPPED | OUTPATIENT
Start: 2022-06-30 | End: 2022-07-21 | Stop reason: SDUPTHER

## 2022-07-05 ENCOUNTER — OFFICE VISIT (OUTPATIENT)
Dept: BEHAVIORAL/MENTAL HEALTH CLINIC | Age: 47
End: 2022-07-05
Payer: COMMERCIAL

## 2022-07-05 ENCOUNTER — HOSPITAL ENCOUNTER (OUTPATIENT)
Dept: PHYSICAL THERAPY | Age: 47
Setting detail: THERAPIES SERIES
Discharge: HOME | End: 2022-07-05
Payer: COMMERCIAL

## 2022-07-05 DIAGNOSIS — F43.10 PTSD (POST-TRAUMATIC STRESS DISORDER): Primary | ICD-10-CM

## 2022-07-05 PROCEDURE — 97140 MANUAL THERAPY 1/> REGIONS: CPT

## 2022-07-05 PROCEDURE — 90832 PSYTX W PT 30 MINUTES: CPT | Performed by: PSYCHOLOGIST

## 2022-07-05 PROCEDURE — 97035 APP MDLTY 1+ULTRASOUND EA 15: CPT

## 2022-07-05 ASSESSMENT — ANXIETY QUESTIONNAIRES
1. FEELING NERVOUS, ANXIOUS, OR ON EDGE: 1
GAD7 TOTAL SCORE: 3
3. WORRYING TOO MUCH ABOUT DIFFERENT THINGS: 1
4. TROUBLE RELAXING: 0
2. NOT BEING ABLE TO STOP OR CONTROL WORRYING: 1
IF YOU CHECKED OFF ANY PROBLEMS ON THIS QUESTIONNAIRE, HOW DIFFICULT HAVE THESE PROBLEMS MADE IT FOR YOU TO DO YOUR WORK, TAKE CARE OF THINGS AT HOME, OR GET ALONG WITH OTHER PEOPLE: NOT DIFFICULT AT ALL
5. BEING SO RESTLESS THAT IT IS HARD TO SIT STILL: 0
7. FEELING AFRAID AS IF SOMETHING AWFUL MIGHT HAPPEN: 0
6. BECOMING EASILY ANNOYED OR IRRITABLE: 0

## 2022-07-05 ASSESSMENT — PATIENT HEALTH QUESTIONNAIRE - PHQ9
8. MOVING OR SPEAKING SO SLOWLY THAT OTHER PEOPLE COULD HAVE NOTICED. OR THE OPPOSITE, BEING SO FIGETY OR RESTLESS THAT YOU HAVE BEEN MOVING AROUND A LOT MORE THAN USUAL: 0
10. IF YOU CHECKED OFF ANY PROBLEMS, HOW DIFFICULT HAVE THESE PROBLEMS MADE IT FOR YOU TO DO YOUR WORK, TAKE CARE OF THINGS AT HOME, OR GET ALONG WITH OTHER PEOPLE: 1
3. TROUBLE FALLING OR STAYING ASLEEP: 1
SUM OF ALL RESPONSES TO PHQ QUESTIONS 1-9: 3
5. POOR APPETITE OR OVEREATING: 0
SUM OF ALL RESPONSES TO PHQ9 QUESTIONS 1 & 2: 0
9. THOUGHTS THAT YOU WOULD BE BETTER OFF DEAD, OR OF HURTING YOURSELF: 0
6. FEELING BAD ABOUT YOURSELF - OR THAT YOU ARE A FAILURE OR HAVE LET YOURSELF OR YOUR FAMILY DOWN: 0
4. FEELING TIRED OR HAVING LITTLE ENERGY: 1
7. TROUBLE CONCENTRATING ON THINGS, SUCH AS READING THE NEWSPAPER OR WATCHING TELEVISION: 1
SUM OF ALL RESPONSES TO PHQ QUESTIONS 1-9: 3
SUM OF ALL RESPONSES TO PHQ QUESTIONS 1-9: 3
1. LITTLE INTEREST OR PLEASURE IN DOING THINGS: 0
2. FEELING DOWN, DEPRESSED OR HOPELESS: 0
SUM OF ALL RESPONSES TO PHQ QUESTIONS 1-9: 3

## 2022-07-05 ASSESSMENT — PAIN SCALES - GENERAL: PAINLEVEL_OUTOF10: 4

## 2022-07-05 ASSESSMENT — PAIN DESCRIPTION - ORIENTATION: ORIENTATION: RIGHT

## 2022-07-05 NOTE — PATIENT INSTRUCTIONS
1. Dedicate 5 minutes 3x/day to practice the deep breathing. Try to focus on making your breath out roughly twice as long as your breath in. Imagine there is a balloon just behind your navel: imagine you are inflating the balloon as you breathe in, deflating as you breathe out. 2. Review the list of apps and give some a try! [facility] Box, Mindfulness , CBTi , and progressive muscle relaxation for sleep, etc.)    3. Read the below information about stress management    4. Follow up as scheduled        \"The entire autonomic nervous system (and through it, our internal organs and glands) is largely driven by our breathing patterns. By changing our breathing we can influence millions of biochemical reactions in our body, producing more relaxing substances such as endorphins and fewer anxiety-producing ones like adrenaline and higher blood acidity. Mindfulness of the breath is so effective that it is common to all meditative and prayer traditions. \" Anxiety Fear & Breathing - Breathing. com    \"When overcoming high levels of anxiety, it is important to learn the techniques of correct breathing. Many people who live with high levels of anxiety are known to breathe through their chest. Shallow breathing through the chest means you are disrupting the balance of oxygen and carbon dioxide necessary to be in a relaxed state. This type of breathing will perpetuate the symptoms of anxiety. \" [email or web address]. com      Diaphragmatic Breathing             _____________________________________________________________________________  1. Sit in a comfortable position    2. Place one hand on your stomach and the other on your chest    3. Try to breathe so that only your stomach rises and falls    As you inhale, concentrate on your chest remaining relatively still while your stomach rises. It may be helpful for you to imagine that your pants are too big and you need to push your stomach out to hold them up.   When exhaling, allow your stomach to fall in and the air to fully escape. Inhale slowly. You may choose to hold the air in for about a second. Exhale slowly. Dont push the air out, but just let the natural pressure of your body slowly move it out. It is normal for this healthy method of breathing to feel a little awkward at first.  With practice, it will feel more natural.    4. Get your mind on your side      One other important factor in getting relaxed is your mind. Your mind and body are connected. The mind influences the body and the body influences the mind. What you do with your mind when you are trying to relax is very important. The key is to avoid thinking about stressful things. You can think about       Neutral things (e.g., counting, saying a word like calm or relax)    Pleasant things (e.g., imagining a pleasant place)     5. It is recommended that you practice 2 times per day, 10 minutes each time. ----------------------------------------------------------------------------------------------------------------------  ----------------------------------------------------------------------------------------------------------------------  ----------------------------------------------------------------------------------------------------------------------  ----------------------------------------------------------------------------------------------------------------------      CONTROLLED or MEASURED BREATHING EXERCISE: (YOU MAY WANT TO DOWNLOAD THE FREE ALISHA \"VIRTUAL HOPE BOX\" TO PRACTICE THIS EXERCISE)     You can sit or stand, but be sure to soften up a little before you begin. Make sure your hands are relaxed, and your knees are soft.  Drop your shoulders and let your jaw relax.  Now breath in slowly through your nose and count to three, keep your shoulders down and allow your stomach to expand as you breathe in.   Hold the breath for a moment.    Now release your breath slowly and smoothly as you count to six.  Repeat for a couple of minutes        It can be very helpful to use tools like relaxed breathing, muscle relaxation, and guided imagery/visualization to cope with stress, pain, anxiety and depression. I recommend to all my patients that they try different techniques to find the ones that work best for them. Below are 2 websites that have several breathing, relaxation, and visualization exercises that you can listen to and download for free.    NetworkAffair.tn. html  · Deep Breathing & Guided Relaxation Exercises (3)  · Guided Imagery/Visualization Exercises (5)  · Mindfulness & Meditation Exercises (3)  · Progressive Muscle Relaxation   · Soothing Instrumental Music (11)    [email or web address]  · Diaphragmatic Breathing   · Deep Breathing I   · Deep Breathing II   · Progressive Muscle Relaxation   · Guided Imagery: [employer]   · Guided Imagery: [facility]   · Relaxing Phrases   · Just This Breath   · Increasing Awareness   · Sending Thoughts Away on Clouds  · Sending Thoughts Away on Leaves  · Sorting Into Boxes     -----------------------------------------------------  Below are several apps that you can download to your smartphone to help with relaxation and mood coping. [id number]  Platform: [employer]  Cost: Free  Your breathing has a profound effect on your body. Nabeel Maddox know this fact to be true if youve ever taken deep breaths to calm yourself down when you were upset. That exercise can often make you feel more centered, and its proof that breathing is powerful. The [id number] adelia uses guided breathing exercises to help reduce symptoms of an anxiety attack. If an attack is coming or the symptoms are unbearable, slip away into a quiet room, open your adelia, and let the worry and stress slip away with each breath.     Universal Breathing - Pranayama Free  Platform: [employer]  Cost: Free  Focused breathing exercises can help you regain composure during an anxiety attack. They can also help you prevent an anxiety attack before one starts. Pranayama breathing techniques are common in yoga and have powerful benefits. If youre a beginner, you can benefit from the adelias guided breathing instruction. Vazquez Blackmon learn how to breathe deeply, hold, and then release with better control. If it works for you, you can purchase the full course which gives you access to the entire program.  Breathing Zone   Platform: 10X Technologieshone & Android  Cost: $3.99   Breathing Zone uses a clinically proven therapeutic breathing exercise that decreases your heart rate, and with daily use can help manage high blood pressure.    ----------------------------------------------  Self-Help for Anxiety Management (TAMMY)  Platform: [employer] & Android  Cost: Free  The Self-Help for Anxiety Management (TAMMY) adelia from the [employer] can help you regain control of your anxiety and emotions. Tell the adelia how youre feeling, how anxious you are, or how worried you are. Then let the adelias self-help features walk you through some calming or relaxation practices. If you want, you can connect with a social network of other [facility] users. Dont worry, the network isnt connected to larger networks like Twitter or [employer]. Stop Panic & Anxiety Help  Platform: Android  Cost: Free  If panic and anxiety attacks have a  on your life, this adelia might help you let them go. The Stop Panic & Anxiety Help Android adelia uses emotion and relaxation training audio tracks to help you fight your fears and find a state of calm. When youve overcome the attack, use the adelias journal to record what caused the attack and how you were able to get through it. Then use this journal to learn from your experiences and prepare for the future.   I Can Be Fearless by Human Progress  Platform: 10X Technologieshone  Cost: Free  When you were younger, your parents might have told you that you could do anything you put your mind to. This adelia might not help you be an astronaut or a world famous actress, but it can help you break through your anxiety, fears, and worries to a place of calm and confidence. Open your Apple device and select what you want to be right now -- calm, motivated, and confident are among the options -- then let the audio hypnosis guide you through a session. Anti-Anxiety   Platform: Android  Cost: Free  You can tell the adelia your problems by taking a diagnostic quiz about your level of stress and anxiety. Using your answers, the adelia will design a custom treatment plan for you. Follow instructional self-help videos like How to Tolerate and Lessen Anxiety.  Keep a daily journal of your anxiety and worries, and track your progress as you learn to regain a sense of calm. Worry Box - Anxiety Self-Help  Platform: Android  Cost: Free  Have you ever wished you could put all your worries in a box, leave them there, and walk away? The Worry Box adelia may let you do just that. The adelia functions a lot like a journal: Write down your thoughts, anxieties, and worries, and let the adelia help you think them through. It will ask questions, give specific anxiety-reducing help, and can even direct you to help you reduce your worries and anxiety. It is all password protected, so you can feel safe sharing the details of your stresses. -----------------------------------------------  Relax Melodies  Platform: Spot Runnerhone and Android  Cost: Free  Anxiety can disrupt healthy sleep patterns in more than one way. First, people who dont get enough sleep tend to feel more anxious. Then, people who are more anxious have a difficult time sleeping. Creating a calming environment may help you fall asleep and stay asleep. Relax to one of this adelias 50 sounds. Need the music to stop once youre asleep? Set a timer, and it will stop playing. Set an alarm when you need to be awake.  Then, enjoy the benefits of a good brings together posture, breathing, and the mind to reduce anxiety. This Android adelia connects users with a library of relaxation videos that contain instructions for relaxing and clearing the mind. The videos are created by Dr. Donaldo Salgado, a psychologist with more than 20 years of experience. In addition to viewing Dr. Danielle Rodriguez videos, you can read a variety of articles related to anxiety, meditation, and stress management. Anxiety Free  Platform: iPDotBlu   Cost: Free  Meditation requires mindfulness and a sense of presence in your thoughts. Hypnosis is one step beyond meditation. It works by sending signals to your brain and transforming it almost unknowingly. The creators of this adelia say that its audio recordings contain subliminal signals that speak to the subconscious with powerful effect.  Hypnosis, like meditation, requires practice, and the goal is to get each user to a place where self-hypnosis is possible in order to reduce anxiety. Relax & Rest Guided Meditations  Platform: iPDotBlu and [employer]  Cost: $0.99  While group meetings and discussions are always an option, some people find relaxation more easily on their own. This adelia lets you relax in the space of your own home or office with three guided meditations. Breath Awareness Guided Meditation (5 minutes), Deep Rested Guided Meditation (13 minutes), and Whole Body Guided Relaxation (24 minutes) are designed specifically to help you relax and sink into a peaceful meditation moment. Equanimity - Meditation Timer & Tracker  Platform: [employer]   Cost: $4.99  Meditation is one way you can cope with the symptoms and side effects of anxiety. People who meditate can eventually train themselves to stay calm when feeling stressed or anxious. Equanimity - Meditation Timer & Tracker is simple and straightforward. Time each session and watch for visual light cues to let you know how long youve been meditating.  Take notes about each session, and watch your progress as you learn to manage your stress and anxiety. Virtual Hope Box  Platform: iPhone and Android  Cost: Free  The Automatic Data Box (VHB) is a smartphone application that contains simple tools to help with coping, relaxation, distraction, and positive thinking via personalized supportive audio, video, pictures, games, mindfulness exercises, positive messages and activity planning, inspirational quotes, coping statements, and other tools. Acupressure: Heal Yourself  Platform: iPhone and Android  Cost: $1.99  Acupressure is a natural healing strategy in which you target specific areas of the body in order to alleviate pain or unwanted symptoms. Acupressure can also increase blood flow, which can boost your mood and your health. This adelia helps you find your bodys acupressure points. Apply pressure on those points when youre feeling overwhelmed, and receive the positive, calming benefit  PTSD : Self-Management of Posttraumatic Stress  Platform: iPhone and Android  Cost: Free  This adelia can help you learn about and manage symptoms that often occur after trauma. Provides information and coping skills for common kinds of posttraumatic stress symptoms and problems, including systematic relaxation and self-help techniques. [place]: Feel better and live happier today  Platform: iPhone  Cost: Free  Daily mood and health tracking as well as journaling. Activities are based on mindfulness, relaxation and Cognitive Behavioral Therapy. Online support, networking and emails. CBT-i : Cognitive Behavioral Therapy for Insomnia  Platform: iPhone  Cost: Free  Identify sleep patterns with a sleep diary and assessment, tools to create new sleep habits, quiet your mind and prevent insomnia in the future. You can set reminders and learn about healthy sleep habits.    Mindfulness   Platform: iPhone  Cost: Free  Mindfulness practice to decrease stress, manage emotional discomfort, depression, physical pain, and other problems. It offers exercises, information, and a tracking log to that you can optimize practice. Livekick  Platform: iPhone  Cost: Free for the first month then $5.99/month for full access  Livekick is a platform to discover original content from top thought-leaders and experts across relationship, career, anxiety, sleep, addiction and more. Their proprietary Programs and Moodboosts help users gain new perspectives and tools to change behaviors and live life in forward motion. Livekick features:  Programs   Created exclusively for Livekick, programs are designed to give users unique insights and tools to feed their appetite for personal growth. Users can listen to bite-sized Coaching sessions and learn new tools such as meditations, breathing exercises, visualizations and affirmations. By offering a holistic, multi-faceted approach, users will be able to reach peak mental performance and gain a deeper understanding of their physical, emotional and spiritual well-being. Moodboost   On a daily basis, people experience a wide range of emotions. Whether they're nervous about a work meeting, having trouble sleeping, or need an extra boost of confidence before their date, Livekick's quick daily Moodboosts can help turn a user's negative thoughts into a positive state of mind. EZMove   Users can track progress and star their favorite sessions and Moodboosts to help them stay on course of their personal growth journey. Users can create a daily mental wellness routine to help them form healthier habits and change behaviors. Ask the Experts   Users can submit personal questions to be answered by Livekick experts and see those posed by the community. They will also get Smooth Sailing tips and Words of [place] to help users navigate their day.

## 2022-07-05 NOTE — PROGRESS NOTES
Physical Therapy  Physical Therapy: Daily Note   Patient: Alysha Baumann (81 y.o. female)   Examination Date:   Plan of Care/Certification Expiration Date: 07/15/22    No data recorded   :  1975 # of Visits since [facility]:   5   MRN: [id number]  CSN: [id number] Start of Care Date:   6/15/2022   Insurance: Payor: [employer] / Plan: [address] [place] [address] / Product Type: *No Product type* /   Insurance ID: [id number] - (Commercial) Secondary Insurance (if applicable):    Referring Physician: Primus Goltz, MD     PCP: Zeina Hough MD Visits to Date/Visits Approved:     No Show/Cancelled Appts: 0 / 0     Medical Diagnosis: Pain in unspecified shoulder [M25.519]    Treatment Diagnosis: Right shoulder pain        SUBJECTIVE EXAMINATION   Pain Level: Pain Screening  Patient Currently in Pain: Yes  Pain Assessment: 0-10  Pain Level: 4  Pain Orientation: Right  Pain Radiating Towards: R shoulder and bicep    Patient Comments: Subjective: Pt arrives to therapy late. She states the KT tape has been helping her \"tremendously! \"    HEP Compliance: Good            TREATMENT         Manual Therapy: (CPT 68845) Treatment Reasoning     Joint Mobilization: R scap mobs with focus on upward rotation and retraction. Very tight with upward rotation  Other: PROM to RUE to dec tightness; flexion, abduction and ER. KT tape I strip to support R bicep 25% tension. I strip across area of pain x 2 with 100% tension for pain relief. I strip to R GH head with posterior medial pull over T8 for postural support 25% tension.                        Modalities  Ultrasound: (CPT [id number]) Treatment Reasoning    Ultrasound specified location: R medial board of scapula  Ultrasound frequency: 1 MHz  Ultrasound intensity (W/cm2): 1.2  Ultrasound mode: Continuous  Ultrasound Parameters: x 10 min prior to manual                         Pt Education: Plan Comment: Estim, US, KT tape, cervical traction, CP, HP ASSESSMENT     Assessment: Assessment: Pt with limited treatment time this date and thus focused on manual scap mobs after US to dec tightness. PROM to RUE to inc ROM followed by KT tape to dec pain and prolong relief. Pt reports pain level remained the same post. Educated on icing at home. Continue with POC. Body Structures, Functions, Activity Limitations Requiring Skilled Therapeutic Intervention: Decreased functional mobility ,Decreased ROM,Decreased strength,Decreased endurance,Increased pain    Post-Treatment Pain Level: Activity Tolerance: Patient limited by pain    Therapy Prognosis: Good       GOALS   Patient goals :  To get rid of her right UE pain so that she can return to her normal function  Short Term Goals Completed by 1-2 weeks from date of evaluation Current Status Goal Status   Pt reports having 3/10 right shoulder pain with ADLS       Pt reports able to complete her HEP 3-5x per week                                                                           Long Term Goals Completed by 4-6 weeks from date of evaluation Current Status Goal Status   Pt reports having 2/10 or less R UE pain while performing ADLS and work duties       Increase AROM of right shoulder to xzagaaq=504 degrees, abduciton =140 degrees and IR= able to reach approx L3-4       Increase RUE strength to 4/5 or >so that pt can perform overhead activities without increased pain       Improve her Spadi (Shoulder Pain & Disability Index) from 90% disability on eval to <30% by time of DC       Pt indep with an advanced HEP                                                    TREATMENT PLAN   Plan Frequency: 1-2x per week  Plan weeks: 4-6 weeks  Current Treatment Recommendations: Strengthening,ROM,Functional mobility training,Manual Therapy - Joint Manipulation,Manual Therapy - Soft Tissue Mobilization,Pain management,Home exercise program,Safety education & training,Modalities   Plan Comment: Estim, US, KT tape, cervical traction, CP, HP       Therapy Time  Individual Time In:       7430  Individual Time Out:  1500  Minutes:  35        Electronically signed by Franklyn Osei PTA  on 7/5/2022 at 4:46 PM   POC NOTE
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
## 2022-07-08 ENCOUNTER — TELEPHONE (OUTPATIENT)
Dept: GASTROENTEROLOGY | Age: 47
End: 2022-07-08

## 2022-07-08 ENCOUNTER — HOSPITAL ENCOUNTER (OUTPATIENT)
Dept: PHYSICAL THERAPY | Age: 47
Setting detail: THERAPIES SERIES
Discharge: HOME | End: 2022-07-08
Payer: COMMERCIAL

## 2022-07-08 PROCEDURE — 97110 THERAPEUTIC EXERCISES: CPT

## 2022-07-08 PROCEDURE — 97140 MANUAL THERAPY 1/> REGIONS: CPT

## 2022-07-08 PROCEDURE — 97035 APP MDLTY 1+ULTRASOUND EA 15: CPT

## 2022-07-08 NOTE — PROGRESS NOTES
Physical Therapy: Daily Note   Patient: Castro Lobo (58 y.o. female)   Examination Date:   Plan of Care/Certification Expiration Date: 07/15/22    No data recorded   :  1975 # of Visits since [facility]:   6   MRN: [id number]  CSN: [id number] Start of Care Date:   6/15/2022   Insurance: Payor: [employer] / Plan: [address] [place] [address] / Product Type: *No Product type* /   Insurance ID: [id number] - (Commercial) Secondary Insurance (if applicable):    Referring Physician: Hans Pierre MD     PCP: Abel Obando MD Visits to Date/Visits Approved:     No Show/Cancelled Appts: 0 / 0     Medical Diagnosis: Pain in unspecified shoulder [M25.519]    Treatment Diagnosis: Right shoulder pain        SUBJECTIVE EXAMINATION   Pain Level:      Patient Comments:      HEP Compliance: Good        OBJECTIVE EXAMINATION   Restrictions:  No data recorded No data recorded No data recorded          TREATMENT     Exercises:      Treatment Reasoning    Exercise 1: BUE ER x 10 hold 3 sec  Exercise 2: wall slides R shd flexion x 5 hold 3 sec  Exercise 3: B shd ext YTB x 10  Exercise 4: doorway stretch x 1 ea hold 20-30 sec hands mid and hands low limited tolerance to hold time  Exercise 5: bicep curls palm up x 1# x 5 reps  Exercise 6: pulleys flexion and abduction x 10 hold 3 sec  Exercise 7: radial nerve stretch x 5  Exercise 8: tray stretch x 5 hold 3 sec                           Manual Therapy: (CPT 51345) Treatment Reasoning     Joint Mobilization: R scap mobs with focus on upward rotation and retraction. Very tight with upward rotation  Soft Tissue Mobilizaton: MFR along bicep and deltoid tuberosity, supraspinatus insertion  Other: PROM to RUE to dec tightness; flexion, abduction IR, and  ER. KT tape I strip to support R bicep 25% tension. I strip across area of pain x 2 with 100% tension for pain relief. I strip to R GH head with posterior medial pull over T8 for postural support 25% tension.                       Modalities  Ultrasound: (CPT 24989) Treatment Reasoning    Patient Position: Supine  Ultrasound location: Right  Ultrasound specified location: R ant GH proximal  Ultrasound frequency: Other (comment) (2 mHz)  Ultrasound intensity (W/cm2): 1.4  Ultrasound mode: Pulsed (50%)                         Pt Education: Plan Comment: Estim, US, KT tape, cervical traction, CP, HP       ASSESSMENT     Assessment: Assessment: Able to initiate gentle strengthening this date with limited reps and mild c/o pain and cramping. US and manual therapy for pain releif and to dec tightness and pain. Pain post is 3/10. Reapplied KT tape as good releif. CP issued to go. Body Structures, Functions, Activity Limitations Requiring Skilled Therapeutic Intervention: Decreased functional mobility ,Decreased ROM,Decreased strength,Decreased endurance,Increased pain    Post-Treatment Pain Level: Activity Tolerance: Patient limited by pain    Therapy Prognosis: Good       GOALS   Patient goals :  To get rid of her right UE pain so that she can return to her normal function  Short Term Goals Completed by 1-2 weeks from date of evaluation Current Status Goal Status   Pt reports having 3/10 right shoulder pain with ADLS       Pt reports able to complete her HEP 3-5x per week                                                                           Long Term Goals Completed by 4-6 weeks from date of evaluation Current Status Goal Status   Pt reports having 2/10 or less R UE pain while performing ADLS and work duties       Increase AROM of right shoulder to gjfxctu=110 degrees, abduciton =140 degrees and IR= able to reach approx L3-4       Increase RUE strength to 4/5 or >so that pt can perform overhead activities without increased pain       Improve her Spadi (Shoulder Pain & Disability Index) from 90% disability on eval to <30% by time of DC       Pt indep with an advanced HEP TREATMENT PLAN   Plan Frequency: 1-2x per week  Plan weeks: 4-6 weeks  Current Treatment Recommendations: Strengthening,ROM,Functional mobility training,Manual Therapy - Joint Manipulation,Manual Therapy - Soft Tissue Mobilization,Pain management,Home exercise program,Safety education & training,Modalities   Requires PT Follow-Up: Yes  Plan Comment: Estim, US, KT tape, cervical traction, CP, HP       Therapy Time  Individual Time In:       300  Individual Time Out:  614  Minutes:  45        Electronically signed by Osito Coppola PTA  on 9/4/4181 at 3:59 PM   [address] [facility]

## 2022-07-13 ENCOUNTER — HOSPITAL ENCOUNTER (OUTPATIENT)
Dept: PHYSICAL THERAPY | Age: 47
Setting detail: THERAPIES SERIES
Discharge: HOME | End: 2022-07-13
Payer: COMMERCIAL

## 2022-07-13 PROCEDURE — 97035 APP MDLTY 1+ULTRASOUND EA 15: CPT

## 2022-07-13 PROCEDURE — 97140 MANUAL THERAPY 1/> REGIONS: CPT

## 2022-07-13 PROCEDURE — 97110 THERAPEUTIC EXERCISES: CPT

## 2022-07-13 ASSESSMENT — PAIN DESCRIPTION - ORIENTATION: ORIENTATION: RIGHT

## 2022-07-13 ASSESSMENT — PAIN DESCRIPTION - LOCATION: LOCATION: SHOULDER;ARM

## 2022-07-13 ASSESSMENT — PAIN DESCRIPTION - PAIN TYPE: TYPE: ACUTE PAIN

## 2022-07-13 ASSESSMENT — PAIN DESCRIPTION - DESCRIPTORS: DESCRIPTORS: SPASM;TIGHTNESS

## 2022-07-13 ASSESSMENT — PAIN DESCRIPTION - FREQUENCY: FREQUENCY: CONTINUOUS

## 2022-07-13 ASSESSMENT — PAIN SCALES - GENERAL: PAINLEVEL_OUTOF10: 4

## 2022-07-13 NOTE — PROGRESS NOTES
Reasoning     Joint Mobilization: R scap mobs with focus on upward rotation and retraction. Inc tightness  with upward rotation  Soft Tissue Mobilizaton: MFR along bicep and deltoid tuberosity, supraspinatus insertion  Other: Deffered KT as mild skin redness and pt. to go for massage post session.                       Modalities  Ultrasound: (CPT 40400) Treatment Reasoning    Patient Position: Supine  Ultrasound location: Right  Ultrasound specified location: R ant GH proximal  Ultrasound frequency: Other (comment) (2 mHz)  Ultrasound intensity (W/cm2): 1.4  Ultrasound mode: Pulsed (50%)                         Pt Education: Plan Comment: Estim, US, KT tape, cervical traction, CP, HP       ASSESSMENT     Assessment: Assessment: Able to continue with RTC strengthening and postural strengthening with some tightness but no spasms. Continued with US and manual therapy to dec pain and tightness. Good response and continued soft tissue at massage post session. Pt. declined CP as going right for massage. Issued strenghteing therex to HEP. Pain holds 4/10. Body Structures, Functions, Activity Limitations Requiring Skilled Therapeutic Intervention: Decreased functional mobility ,Decreased ROM,Decreased strength,Decreased endurance,Increased pain    Post-Treatment Pain Level: Activity Tolerance: Patient limited by pain; Patient tolerated treatment well; Patient limited by endurance    Therapy Prognosis: Good       GOALS   Patient goals :  To get rid of her right UE pain so that she can return to her normal function  Short Term Goals Completed by 1-2 weeks from date of evaluation Current Status Goal Status   Pt reports having 3/10 right shoulder pain with ADLS       Pt reports able to complete her HEP 3-5x per week                                                                           Long Term Goals Completed by 4-6 weeks from date of evaluation Current Status Goal Status   Pt reports having 2/10 or less R UE pain while performing ADLS and work duties       Increase AROM of right shoulder to kvhlbsx=798 degrees, abduciton =140 degrees and IR= able to reach approx L3-4       Increase RUE strength to 4/5 or >so that pt can perform overhead activities without increased pain       Improve her Spadi (Shoulder Pain & Disability Index) from 90% disability on eval to <30% by time of DC       Pt indep with an advanced HEP                                                    TREATMENT PLAN   Plan Frequency: 1-2x per week  Plan weeks: 4-6 weeks  Current Treatment Recommendations: Strengthening,ROM,Functional mobility training,Manual Therapy - Joint Manipulation,Manual Therapy - Soft Tissue Mobilization,Pain management,Home exercise program,Safety education & training,Modalities   Requires PT Follow-Up: Yes  Plan Comment: Estim, US, KT tape, cervical traction, CP, HP       Therapy Time  Individual Time In:       300  Individual Time Out:  345  Minutes:  45        Electronically signed by Kristina Borges PTA  on 2/16/0818 at 4:09 PM   [address] [facility]

## 2022-07-14 PROBLEM — M25.519 SHOULDER PAIN: Status: ACTIVE | Noted: 2022-07-14

## 2022-07-14 PROBLEM — M54.10 RADICULAR PAIN: Status: ACTIVE | Noted: 2022-07-14

## 2022-07-14 PROBLEM — M54.2 NECK PAIN: Status: ACTIVE | Noted: 2022-07-14

## 2022-07-15 ENCOUNTER — HOSPITAL ENCOUNTER (OUTPATIENT)
Dept: PHYSICAL THERAPY | Age: 47
Setting detail: THERAPIES SERIES
Discharge: HOME | End: 2022-07-15
Payer: COMMERCIAL

## 2022-07-15 PROCEDURE — 97035 APP MDLTY 1+ULTRASOUND EA 15: CPT

## 2022-07-15 PROCEDURE — 97140 MANUAL THERAPY 1/> REGIONS: CPT

## 2022-07-15 PROCEDURE — 97110 THERAPEUTIC EXERCISES: CPT

## 2022-07-15 NOTE — PROGRESS NOTES
Physical Therapy: Daily Note   Patient: Genet Yu (13 y.o. female)   Examination Date:   Plan of Care/Certification Expiration Date: 07/15/22    No data recorded   :  1975 # of Visits since [facility]:   8   MRN: [id number]  CSN: [id number] Start of Care Date:   6/15/2022   Insurance: Payor: [employer] / Plan: [address] [place] [address] / Product Type: *No Product type* /   Insurance ID: [id number] - (Commercial) Secondary Insurance (if applicable):    Referring Physician: Dell Chang MD     PCP: Daquan Argueta MD Visits to Date/Visits Approved:     No Show/Cancelled Appts: 0 / 0     Medical Diagnosis: Pain in unspecified shoulder [M25.519]    Treatment Diagnosis: Right shoulder pain        SUBJECTIVE EXAMINATION   Pain Level:      Patient Comments: Subjective: Mt tricep and bicep is what is bothering me today, I had to take a muscle relaxer last night due to pain. I have knots in the biceps. I can not bring my arm out to side yet with abduction due to pinching feeling with elevating arm. HEP Compliance: FAIR        OBJECTIVE EXAMINATION   Restrictions:  No data recorded No data recorded No data recorded              TREATMENT     Exercises:      Treatment Reasoning    Exercise 1: BUE ER x 10 hold 3 sec  Exercise 3: High Rows YTB x 10 hold 3 sec  Exercise 4: continued to verbally review doorway stretch and radial nerve stretch  Exercise 5: Mid Rows YTB x 10 hold 3 sec  Exercise 6: Table slides flexion and abduction with foam roller x 10 ea hold 5 sec  Exercise 7: Wall slides with pillow case B shoulder flexion x 10 with 3-5 sec Hold. Exercise 8: Pulleys tolerating flexion only x 10' AAROM. Exercise 9: Attempted wand shoulder flexion with back against wall and knees bent. Pt. tolerated one rep with c/o bicep and tricep pain. Pt. able to slowly raise shoulder up to ~ 170 degrees.                           Manual Therapy: (CPT 79795) Treatment Reasoning     Joint Mobilization: R scap mobs with focus on upward rotation, retraction, and rotation. Inc tightness and rigid ROM with superior and upward rotation. Other: PROM with R shoulder Radial and median nerve stretch with arm extended towards locked position ~ 1- 25 sec holds x 6 to decrease tension. Modalities  Ultrasound: (CPT 93199) Treatment Reasoning    Patient Position: Supine  Ultrasound location: Right  Ultrasound specified location: R tricep posterior and lateral region with complaints of knots and pain. Ultrasound frequency: 1 MHz (2 mHz)  Ultrasound intensity (W/cm2): 1.5  Ultrasound mode: Pulsed (20%)  Post treatment skin assessment: Intact Limitations addressed: Tissue extensibility, Pain modulation                    Pt Education: Plan Comment: Estim, US, KT tape, cervical traction, CP, HP       ASSESSMENT     Assessment: Assessment: Pt. tolerates current R shoulder A/AOM with good technique. Modified to advance R shoulder flexion with wallslides to improve stretch and strength with scapular rythm ROM to improve ROM tolerance. Unable to advance vertical R shoulder flexion ROM tolerance with wand this date due to c/o nerve pulling pain in tricep and bicep region. Focus on manual therapy to assist with scapular ROM and radial and median nerve stretches to decrease tolerance and improve ROM tolerance. Pt. reports and demo's good response ending Rx session with US to R tricep region to decrease pain and tightness. Body Structures, Functions, Activity Limitations Requiring Skilled Therapeutic Intervention: Decreased functional mobility , Decreased ROM, Decreased strength, Decreased endurance, Increased pain    Post-Treatment Pain Level: Activity Tolerance: Patient limited by pain; Patient tolerated treatment well; Patient limited by endurance    Therapy Prognosis: Good       GOALS   Patient goals :  To get rid of her right UE pain so that she can return to her normal function  Short Term Goals Completed by 1-2 weeks from date of evaluation Current Status Goal Status   Pt reports having 3/10 right shoulder pain with ADLS       Pt reports able to complete her HEP 3-5x per week                                                                           Long Term Goals Completed by 4-6 weeks from date of evaluation Current Status Goal Status   Pt reports having 2/10 or less R UE pain while performing ADLS and work duties       Increase AROM of right shoulder to [id number]=851 degrees, abduciton =140 degrees and IR= able to reach approx L3-4       Increase RUE strength to 4/5 or >so that pt can perform overhead activities without increased pain       Improve her Spadi (Shoulder Pain & Disability Index) from 90% disability on eval to <30% by time of DC       Pt indep with an advanced HEP                                                    TREATMENT PLAN   Plan Frequency: 1-2x per week  Plan weeks: 4-6 weeks  Current Treatment Recommendations: Strengthening, ROM, Functional mobility training, Manual Therapy - Joint Manipulation, Manual Therapy - Soft Tissue Mobilization, Pain management, Home exercise program, Safety education & training, Modalities   Plan Comment: Estim, US, KT tape, cervical traction, CP, HP       Therapy Time  Individual Time In:  130       Individual Time Out:  230  Minutes:  60        Electronically signed by Carissa Babb PTA  on 7/15/2022 at 2:45 PM   POC NOTE

## 2022-07-19 ENCOUNTER — OFFICE VISIT (OUTPATIENT)
Dept: NEUROLOGY | Age: 47
End: 2022-07-19
Payer: COMMERCIAL

## 2022-07-19 VITALS
HEART RATE: 121 BPM | WEIGHT: 240 LBS | BODY MASS INDEX: 38.57 KG/M2 | SYSTOLIC BLOOD PRESSURE: 110 MMHG | DIASTOLIC BLOOD PRESSURE: 80 MMHG | HEIGHT: 66 IN

## 2022-07-19 DIAGNOSIS — G56.12 MEDIAN NERVE DYSFUNCTION, LEFT: ICD-10-CM

## 2022-07-19 DIAGNOSIS — R41.840 CONCENTRATION DEFICIT: ICD-10-CM

## 2022-07-19 DIAGNOSIS — H53.143 PHOTOPHOBIA OF BOTH EYES: ICD-10-CM

## 2022-07-19 DIAGNOSIS — R41.840 ATTENTION DEFICIT: ICD-10-CM

## 2022-07-19 DIAGNOSIS — F07.81 POST-CONCUSSION SYNDROME: Primary | ICD-10-CM

## 2022-07-19 DIAGNOSIS — M54.10 RADICULAR PAIN: ICD-10-CM

## 2022-07-19 DIAGNOSIS — M47.812 CERVICAL SPONDYLOSIS WITHOUT MYELOPATHY: ICD-10-CM

## 2022-07-19 PROCEDURE — 99214 OFFICE O/P EST MOD 30 MIN: CPT | Performed by: PSYCHIATRY & NEUROLOGY

## 2022-07-19 ASSESSMENT — ENCOUNTER SYMPTOMS
BACK PAIN: 0
TROUBLE SWALLOWING: 0
SHORTNESS OF BREATH: 0
CHOKING: 0
VOMITING: 0
PHOTOPHOBIA: 1
COLOR CHANGE: 0
NAUSEA: 0

## 2022-07-19 NOTE — PROGRESS NOTES
Subjective:      Patient ID: Alysha Baumann is a 56 y.o. female who presents today for:  Chief Complaint   Patient presents with    3 Month Follow-Up     Post-concussion syndrome, patient states she's still having some issues with sound sensitive and lights but the medication is working. HPI 60-year-old right-handed female with a history of postconcussion syndrome with median nerve dysfunction with concentration deficit closed head injury and cervical spondylosis. Patient is on Ritalin 10 mg in the morning and 2 PM and gabapentin and BuSpar. I truly feel that some of the medications affecting her memory. She also has been followed by pain management cervical spondylosis. Was having postconcussional headaches which are migrainous in. Offered her CGRP blockers. Patient appears to be doing well on the combination of these medications. She sleeps well and her concentration is much better.     Past Medical History:   Diagnosis Date    HLD (hyperlipidemia)     Migraine with aura and without status migrainosus, not intractable      Past Surgical History:   Procedure Laterality Date    HAND SURGERY Right     Middle finger trigger finger release    TONSILLECTOMY       Social History     Socioeconomic History    Marital status: Single     Spouse name: Not on file    Number of children: Not on file    Years of education: Not on file    Highest education level: Not on file   Occupational History    Not on file   Tobacco Use    Smoking status: Former    Smokeless tobacco: Current     Types: Chew   Substance and Sexual Activity    Alcohol use: Yes    Drug use: Never    Sexual activity: Not on file   Other Topics Concern    Not on file   Social History Narrative    Not on file     Social Determinants of Health     Financial Resource Strain: Low Risk     Difficulty of Paying Living Expenses: Not hard at all   Food Insecurity: No Food Insecurity    Worried About [employer] in the Last Year: Never true    Ran Out of Food in the Last Year: Never true   Transportation Needs: Not on file   Physical Activity: Not on file   Stress: Not on file   Social Connections: Not on file   Intimate Partner Violence: Not on file   Housing Stability: Not on file     Family History   Problem Relation Age of Onset    No Known Problems Mother     Diabetes Father     Hypertension Father      Allergies   Allergen Reactions    Nickel Hives and Itching    Other      Other reaction(s): Dizziness    Oxycodone-Acetaminophen Other (See Comments)     nausea      Neosporin  [Bacitracin-Polymyxin B] Hives    Vitamin B12 Hives    Azithromycin Hives    Vitamin B Complex  [B Complex-B12] Hives       Current Outpatient Medications   Medication Sig Dispense Refill    methylphenidate (RITALIN) 10 MG tablet Take one tablet in the morning and one tablet at 2 pm. 60 tablet 0    etonogestrel-ethinyl estradiol (NUVARING) 0.12-0.015 MG/24HR vaginal ring Vaginal: One ring, inserted vaginally and left in place continuously for 3 consecutive weeks, then removed for 1 week. A new ring is inserted 7 days after the last was removed 3 each 3    gabapentin (NEURONTIN) 300 MG capsule Take 4 capsules by mouth 2 times daily for 30 days.  240 capsule 0    busPIRone (BUSPAR) 5 MG tablet Take 1 tablet by mouth 2 times daily 60 tablet 2    dicyclomine (BENTYL) 10 MG capsule Take 1 capsule by mouth 3 times daily as needed (abdominal pain) 90 capsule 1    cyclobenzaprine (FLEXERIL) 10 MG tablet Take 10 mg by mouth 2 times daily as needed for Muscle spasms       Na Sulfate-K Sulfate-Mg Sulf (SUPREP) 17.5-3.13-1.6 GM/177ML SOLN solution As directed (Patient not taking: Reported on 7/19/2022) 1 each 0    pantoprazole (PROTONIX) 20 MG tablet Take 2 tablets by mouth daily (Patient not taking: Reported on 7/19/2022) 60 tablet 0    ondansetron (ZOFRAN ODT) 4 MG disintegrating tablet Take 1 tablet by mouth every 8 hours as needed for Nausea (Patient not taking: Reported on 7/19/2022) 20 tablet 0     No current facility-administered medications for this visit. Review of Systems   Constitutional:  Negative for fever. HENT:  Negative for ear pain, tinnitus and trouble swallowing. Eyes:  Positive for photophobia and visual disturbance. Respiratory:  Negative for choking and shortness of breath. Cardiovascular:  Negative for chest pain and palpitations. Gastrointestinal:  Negative for nausea and vomiting. Musculoskeletal:  Positive for neck pain. Negative for back pain, gait problem, joint swelling, myalgias and neck stiffness. Skin:  Negative for color change. Allergic/Immunologic: Negative for food allergies. Neurological:  Positive for weakness. Negative for dizziness, tremors, seizures, syncope, facial asymmetry, speech difficulty, light-headedness, numbness and headaches. Psychiatric/Behavioral:  Negative for behavioral problems, confusion, hallucinations and sleep disturbance. Objective:   /80 (Site: Left Upper Arm, Position: Sitting, Cuff Size: Medium Adult)   Pulse (!) 121   Ht 5' 6\" (1.676 m)   Wt 240 lb (108.9 kg)   BMI 38.74 kg/m²     Physical Exam  Vitals reviewed. Eyes:      Pupils: Pupils are equal, round, and reactive to light. Cardiovascular:      Rate and Rhythm: Normal rate and regular rhythm. Heart sounds: No murmur heard. Pulmonary:      Effort: Pulmonary effort is normal.      Breath sounds: Normal breath sounds. Abdominal:      General: Bowel sounds are normal.   Musculoskeletal:         General: Normal range of motion. Cervical back: Normal range of motion. Skin:     General: Skin is warm. Neurological:      Mental Status: She is alert and oriented to person, place, and time. Cranial Nerves: No cranial nerve deficit. Sensory: No sensory deficit. Motor: No abnormal muscle tone. Coordination: Coordination normal.      Deep Tendon Reflexes: Reflexes are normal and symmetric.  Babinski sign absent on the right side. Babinski sign absent on the left side. Psychiatric:         Mood and Affect: Mood normal.       No results found. Lab Results   Component Value Date/Time    WBC 6.7 06/17/2022 10:48 AM    RBC 5.36 06/17/2022 10:48 AM    HGB 13.9 06/17/2022 10:48 AM    HCT 42.6 06/17/2022 10:48 AM    MCV 79.4 06/17/2022 10:48 AM    MCH 26.0 06/17/2022 10:48 AM    MCHC 32.7 06/17/2022 10:48 AM    RDW 16.2 06/17/2022 10:48 AM     06/17/2022 10:48 AM     Lab Results   Component Value Date/Time     06/17/2022 10:48 AM    K 4.2 06/17/2022 10:48 AM     06/17/2022 10:48 AM    CO2 18 06/17/2022 10:48 AM    BUN 13 06/17/2022 10:48 AM    CREATININE 0.63 06/17/2022 10:48 AM    GFRAA >60.0 06/17/2022 10:48 AM    LABGLOM >60.0 06/17/2022 10:48 AM    GLUCOSE 281 06/17/2022 10:48 AM    PROT 7.3 06/17/2022 10:48 AM    LABALBU 4.0 06/17/2022 10:48 AM    CALCIUM 9.1 06/17/2022 10:48 AM    BILITOT 0.3 06/17/2022 10:48 AM    ALKPHOS 150 06/17/2022 10:48 AM    AST 19 06/17/2022 10:48 AM    ALT 30 06/17/2022 10:48 AM     No results found for: PROTIME, INR  No results found for: TSH, [id number], FOLATE, FERRITIN, IRON, TIBC, PTRFSAT, RETICCOUNT, TSH, FREET4  Lab Results   Component Value Date/Time    HDL 60 06/17/2022 10:49 AM    LDLCALC 131 06/17/2022 10:49 AM     No results found for: LABAMPH, BARBSCNU, LABBENZ, CANNAB, COCAINESCRN, LABMETH, OPIATESCREENURINE, PHENCYCLIDINESCREENURINE, PPXUR, ETOH  No results found for: LITHIUM, DILFRTOT, VALPROATE    Assessment:       Diagnosis Orders   1. Post-concussion syndrome        2. Photophobia of both eyes        3. Median nerve dysfunction, left        4. Radicular pain        5. Cervical spondylosis without myelopathy        6. Concentration deficit        7. Attention deficit        Postconcussion syndrome with some degree of attention difficulties and concentration issues.   This is responded very well to Ritalin 10 mg in the morning and 10 mg in the afternoon without any side effects. Patient also is on gabapentin and buspirone which can add to some of the concentration issues. She has no reticulocyte pain in the right arm and therefore she has to be on this medication and was this is somewhat better we we will try to decrease her medications as this may help her. Her neck pain is not as bothersome. She still has significant photophobia but has not had major headaches of concern. She uses over-the-counter medications. We did offer her CGRP blockers though she reports this this combination of gabapentin and BuSpar is helping. Once we start to retract her gabapentin at some point in her headaches increase then we may consider CGRP blockers for headaches      Plan:      No orders of the defined types were placed in this encounter. No orders of the defined types were placed in this encounter. No follow-ups on file.       Adam Sanz MD

## 2022-07-20 ENCOUNTER — HOSPITAL ENCOUNTER (OUTPATIENT)
Dept: PHYSICAL THERAPY | Age: 47
Setting detail: THERAPIES SERIES
Discharge: HOME | End: 2022-07-20
Payer: COMMERCIAL

## 2022-07-20 PROCEDURE — 97530 THERAPEUTIC ACTIVITIES: CPT

## 2022-07-20 PROCEDURE — 97140 MANUAL THERAPY 1/> REGIONS: CPT

## 2022-07-20 ASSESSMENT — PAIN DESCRIPTION - LOCATION: LOCATION: SHOULDER

## 2022-07-20 ASSESSMENT — PAIN DESCRIPTION - ORIENTATION: ORIENTATION: RIGHT

## 2022-07-20 ASSESSMENT — PAIN DESCRIPTION - DESCRIPTORS: DESCRIPTORS: ACHING;DULL

## 2022-07-20 ASSESSMENT — PAIN DESCRIPTION - PAIN TYPE: TYPE: ACUTE PAIN

## 2022-07-20 ASSESSMENT — PAIN SCALES - GENERAL: PAINLEVEL_OUTOF10: 3

## 2022-07-20 ASSESSMENT — PAIN DESCRIPTION - FREQUENCY: FREQUENCY: CONTINUOUS

## 2022-07-20 NOTE — PROGRESS NOTES
Physical Therapy: Recheck and Daily Note   Patient: Alysha Baumann (52 y.o. female)   Examination Date:   Plan of Care/Certification Expiration Date: 22    No data recorded   :  1975 # of Visits since [facility]:   9   MRN: [id number]  CSN: [id number] Start of Care Date:   6/15/2022   Insurance: Payor: [employer] / Plan: [address] [place] [address] / Product Type: *No Product type* /   Insurance ID: [id number] - (Commercial) Secondary Insurance (if applicable):    Referring Physician: Primus Goltz, MD Dr Kristine Scurry   PCP: Zeina Hough MD Visits to Date/Visits Approved:  (9 current plus 1-2x per week for 4-6 weeks=18)    No Show/Cancelled Appts: 0 / 0     Medical Diagnosis: Pain in unspecified shoulder [M25.519]    Treatment Diagnosis: Right shoulder pain        SUBJECTIVE EXAMINATION   Pain Level: Pain Screening  Patient Currently in Pain: Yes  Pain Assessment: 0-10  Pain Level: 3  Patient's Stated Pain Goal: 3  Pain Type: Acute pain  Pain Location: Shoulder  Pain Orientation: Right  Pain Descriptors: Aching, Dull  Pain Frequency: Continuous    Patient Comments: Subjective: Pt reports just coming from her massage therapist that her pain is 3/10 \"achy and dull pain\"    HEP Compliance: Good        OBJECTIVE EXAMINATION   Restrictions:  No data recorded No data recorded No data recorded      Right AROM  Right PROM         AROM RUE (degrees)  R Shoulder Flexion 0-180: 0-113 degrees in standing with lateral arm/shoulder pain  R Shoulder ABduction 0-180: 0-71 degreees in standing with increase pain  R Shoulder Int Rotation  0-70: Pt able to place her right hand to her right lateral buttock            Left AROM  Right AROM        AROM RUE (degrees)  R Shoulder Flexion 0-180: 0-113 degrees in standing with lateral arm/shoulder pain  R Shoulder ABduction 0-180: 0-71 degreees in standing with increase pain  R Shoulder Int Rotation  0-70: Pt able to place her right hand to her right lateral buttock       Left Strength  Right Strength              Strength RUE  R Shoulder Flexion: 3+/5  R Shoulder ABduction: 3+/5  R Shoulder Internal Rotation: 3/5, 3+/5  R Shoulder External Rotation: 3/5, 3+/5  R Elbow Flexion: 3+/5, 3/5  R Elbow Extension: 4-/5, 4/5       Hand Dominance:     Left  Right     Strength  Handle Setting 2: Average 67# L  Handle Setting 2: 30#, 40#a and 26#= average=32#   Pinch Strength (if applicable)           Left Hand Edema  Right Hand Edema             Fine Motor Skills:      TREATMENT     Manual Therapy: (CPT 86065) Treatment Reasoning     Other: KT tape to her right UE: 1. Anchored to her right mid scap with medial inferior pull 25% for positioning; 2nd strip: Y pattern to right deltoid anchored distally with 25% pull on ends for assisting muscle; 3rd: Anchored to her distal bicep with 25% pull proximally to pain control and assistance of muscle 4th: Tricep with strip anchored distally with 25% tension pull proximally for pain control. Pt Education: Plan Comment: Estim, US, KT tape, cervical traction, CP, HP       ASSESSMENT     Assessment: Assessment: Pt arrives to therapy with 3/10 R UE pain after her massage therapy with reports that her bicep pain is less since her massage. PT completed recheck measures with plans to continue with PT as pt is making progress in all areas and is now starting to tolerate her strengthening program but that her arm was very flared up after her last session where pt has trouble using her arm at work. PT applied KT tape in various areas of her R UE to provide support, muscle assistance and pain relief as pts pain increased after ROM and MMT measurements taken. Pt to ice as home if needed. Plan to continue with PT for 1-2x per week for 4-6 weeks to progress with ROM, strengthening and pain control.   Body Structures, Functions, Activity Limitations Requiring Skilled Therapeutic Intervention: Decreased functional mobility , Decreased ROM, Decreased strength, Decreased endurance, Increased pain    Post-Treatment Pain Level: Activity Tolerance: Patient limited by endurance; Patient limited by pain    Therapy Prognosis: Good       GOALS   Patient goals :  To get rid of her right UE pain so that she can return to her normal function  Short Term Goals Completed by 1-2 weeks from date of recheck Current Status Goal Status   Pt reports having 3/10 or less right shoulder pain with ADLS Pt reports 9/79 up to 6-8/10 at times Not Met   Pt reports able to complete her HEP 3-5x per week Pt 5 out of 7 days of week Met                                                                       Long Term Goals Completed by 4-6 weeks from date of recheck Current Status Goal Status   Pt reports having 2/10 or less R UE pain while performing ADLS and work duties Pt mostly at a 4/10 and up to 6-8/10 at times Not Met   Increase AROM of right shoulder to [id number]=645 degrees, abduciton =140 degrees and IR= able to reach approx L3-4 Shoulder flexion= 113           Right shoudler abduction=0-71 with pain        IR=outside lateral right buttock with increased pain Not Met, In progress   Increase RUE strength to 4/5 or >so that pt can perform overhead activities without increased pain Progressing with strengthening In progress   Improve her Spadi (Shoulder Pain & Disability Index) from 90% disability on eval to <30% by time of DC Spadi at recheck=47% Not Met, In progress   Pt indep with an advanced HEP Pt continues to progress with her HEP In progress                                                TREATMENT PLAN   Plan Frequency: 1-2x per week  Plan weeks: 4-6 weeks  Current Treatment Recommendations: Strengthening, ROM, Functional mobility training, Manual Therapy - Joint Manipulation, Manual Therapy - Soft Tissue Mobilization, Pain management, Home exercise program, Safety education & training, Modalities   Requires PT Follow-Up: Yes  Plan Comment: Estim, US, KT tape, cervical traction, CP, HP       Therapy Time  Individual Time In:      1:45pm   Individual Time Out:   2:45pm   Minutes:   60 min     Electronically signed by Hussein Hanley PT  on 7/20/2022 at 5:27 PM

## 2022-07-21 RX ORDER — METHYLPHENIDATE HYDROCHLORIDE 10 MG/1
TABLET ORAL
Qty: 60 TABLET | Refills: 0 | Status: SHIPPED | OUTPATIENT
Start: 2022-07-26 | End: 2022-10-12

## 2022-07-22 ENCOUNTER — HOSPITAL ENCOUNTER (EMERGENCY)
Age: 47
Discharge: HOME | End: 2022-07-23
Attending: EMERGENCY MEDICINE
Payer: COMMERCIAL

## 2022-07-22 ENCOUNTER — HOSPITAL ENCOUNTER (OUTPATIENT)
Dept: PHYSICAL THERAPY | Age: 47
Setting detail: THERAPIES SERIES
Discharge: HOME | End: 2022-07-22
Payer: COMMERCIAL

## 2022-07-22 ENCOUNTER — APPOINTMENT (OUTPATIENT)
Dept: GENERAL RADIOLOGY | Age: 47
End: 2022-07-22
Payer: COMMERCIAL

## 2022-07-22 DIAGNOSIS — S42.201A CLOSED FRACTURE OF PROXIMAL END OF RIGHT HUMERUS, UNSPECIFIED FRACTURE MORPHOLOGY, INITIAL ENCOUNTER: Primary | ICD-10-CM

## 2022-07-22 PROCEDURE — 97140 MANUAL THERAPY 1/> REGIONS: CPT

## 2022-07-22 PROCEDURE — 99284 EMERGENCY DEPT VISIT MOD MDM: CPT

## 2022-07-22 PROCEDURE — 6370000000 HC RX 637 (ALT 250 FOR IP): Performed by: EMERGENCY MEDICINE

## 2022-07-22 PROCEDURE — 6360000002 HC RX W HCPCS: Performed by: EMERGENCY MEDICINE

## 2022-07-22 PROCEDURE — 73030 X-RAY EXAM OF SHOULDER: CPT

## 2022-07-22 PROCEDURE — G0283 ELEC STIM OTHER THAN WOUND: HCPCS

## 2022-07-22 PROCEDURE — 96372 THER/PROPH/DIAG INJ SC/IM: CPT

## 2022-07-22 PROCEDURE — 97110 THERAPEUTIC EXERCISES: CPT

## 2022-07-22 RX ORDER — ONDANSETRON 4 MG/1
4 TABLET, ORALLY DISINTEGRATING ORAL ONCE
Status: COMPLETED | OUTPATIENT
Start: 2022-07-22 | End: 2022-07-22

## 2022-07-22 RX ADMIN — HYDROMORPHONE HYDROCHLORIDE 1 MG: 1 INJECTION, SOLUTION INTRAMUSCULAR; INTRAVENOUS; SUBCUTANEOUS at 23:57

## 2022-07-22 RX ADMIN — ONDANSETRON 4 MG: 4 TABLET, ORALLY DISINTEGRATING ORAL at 23:57

## 2022-07-22 ASSESSMENT — ENCOUNTER SYMPTOMS
ABDOMINAL PAIN: 0
COUGH: 0
BACK PAIN: 0
SORE THROAT: 0
EYE REDNESS: 0
SHORTNESS OF BREATH: 0
NAUSEA: 0
EYE DISCHARGE: 0
VOMITING: 0

## 2022-07-22 ASSESSMENT — PAIN DESCRIPTION - ORIENTATION
ORIENTATION: LEFT
ORIENTATION: RIGHT

## 2022-07-22 ASSESSMENT — PAIN SCALES - GENERAL
PAINLEVEL_OUTOF10: 3
PAINLEVEL_OUTOF10: 10

## 2022-07-22 ASSESSMENT — PAIN DESCRIPTION - LOCATION
LOCATION: ARM
LOCATION: ARM

## 2022-07-22 NOTE — PROGRESS NOTES
Physical Therapy: Daily Note   Patient: Sreekanth Alvarado (47 y.o. female)   Examination Date:   Plan of Care/Certification Expiration Date: 22    No data recorded   :  1975 # of Visits since [facility]:   10   MRN: [id number]  CSN: [id number] Start of Care Date:   6/15/2022   Insurance: Payor: [employer] / Plan: [address] [place] [address] / Product Type: *No Product type* /   Insurance ID: [id number] - (Commercial) Secondary Insurance (if applicable):    Referring Physician: MD Dr Mitchell Chacko   PCP: Omar Sue MD Visits to Date/Visits Approved: 10 / 18 (9 current plus 1-2x per week for 4-6 weeks=18)    No Show/Cancelled Appts: 0 / 0     Medical Diagnosis: Pain in unspecified shoulder [M25.519]    Treatment Diagnosis: Right shoulder pain        SUBJECTIVE EXAMINATION   Pain Level: Pain Screening  Patient Currently in Pain: Yes  Pain Assessment: 0-10  Pain Level: 3  Pain Location: Arm  Pain Orientation: Right    Patient Comments: Subjective: Pt states after last tx she was flared up to an 8/10 pain and almost had to leave work. Pt's massage therapist has been working on releasing scar tissue. Pt has not done band ther ex since last tx d/t recent flare up.      HEP Compliance: Good        OBJECTIVE EXAMINATION   RUE PROM   Flexion: ~0-115 deg  Abduction~0-100 deg   ER full ROM  IR ~0-30 deg   TREATMENT     Exercises:      Treatment Reasoning    Exercise 1: pulleys; H5'' x 10 flexion and abduction  Exercise 2: wall slides flexion with eccentric abduction; 2 x 5  Exercise 3: B shoulder extension; YTB H3'' x 10  Exercise 4: towel stretch behind the back adduction RUE; H5'' x 5  Exercise 5: IR towel stretch; attempted, painful and deferred                          Manual Therapy: (CPT 92201) Treatment Reasoning     Joint Mobilization: R scap mobs with focus on upward rotation and retraction  Other: PROM to RUE flexion, abduction and IR (performed stretches with distraction in order to open shoulder joint and dec pressure on nerve with good response)                      Modalities  Electric Stimulation, unattended:  (CPT 22 156849) /   Treatment Reasoning    Patient Position: Seated  E-stim location: Right  E-stim specified location: R upper trap, triceps, bicep and pec  E-stim type: Interferential (IFC)  E-stim via: 4 Electrode Pads  E-stim Parameters: IFC/high/static x 15 min to R shoulder to dec pain and dec muscle tightness post tx. (in conjunction with ice)                        Pt Education: Plan Comment: Estim, US, KT tape, cervical traction, CP, HP       ASSESSMENT     Assessment: Assessment: Pt recently flared with pain and thus states she would like to stick with YTB this date. Pt performed band ther ex and displayed fatigue with shaking noted after inc in reps. No inc in pain during ther ex however and able to set scapula with good form. Trialed horizontal adduction stretch behind back as well as IR stretch which was not tolerable this date. Performed scap mobs and PROM to RUE with inc tolerance while providing distraction. Pt reporting pain level 4/10 post. Donned estim and CP to R shoulder for further pain relief. Pt reports shoulder \"felt good\" post but did not rate pain. Will continue with POC. Body Structures, Functions, Activity Limitations Requiring Skilled Therapeutic Intervention: Decreased functional mobility , Decreased ROM, Decreased strength, Decreased endurance, Increased pain    Post-Treatment Pain Level: Activity Tolerance: Patient limited by endurance; Patient limited by pain    Therapy Prognosis: Good       GOALS   Patient goals :  To get rid of her right UE pain so that she can return to her normal function  Short Term Goals Completed by 1-2 weeks from date of recheck Current Status Goal Status   Pt reports having 3/10 or less right shoulder pain with ADLS Pt reports 7/01 up to 6-8/10 at times Not Met   Pt reports able to complete her HEP 3-5x per week Pt 5 out of 7 days of week Met                                                                       Long Term Goals Completed by 4-6 weeks from date of recheck Current Status Goal Status   Pt reports having 2/10 or less R UE pain while performing ADLS and work duties Pt mostly at a 4/10 and up to 6-8/10 at times Not Met   Increase AROM of right shoulder to [id number] degrees, abduciton =140 degrees and IR= able to reach approx L3-4 Shoulder flexion=                 Right shoudler abduction=        IR=     Increase RUE strength to 4/5 or >so that pt can perform overhead activities without increased pain       Improve her Spadi (Shoulder Pain & Disability Index) from 90% disability on eval to <30% by time of DC Spadi at recheck=                                                          TREATMENT PLAN   Plan Frequency: 1-2x per week  Plan weeks: 4-6 weeks  Current Treatment Recommendations: Strengthening, ROM, Functional mobility training, Manual Therapy - Joint Manipulation, Manual Therapy - Soft Tissue Mobilization, Pain management, Home exercise program, Safety education & training, Modalities   Plan Comment: Estim, US, KT tape, cervical traction, CP, HP       Therapy Time  Individual Time In:       1100  Individual Time Out:  1200  Minutes:  60        Electronically signed by Christine Manley PTA  on 7/22/2022 at 12:14 PM   POC NOTE

## 2022-07-23 VITALS
HEART RATE: 99 BPM | BODY MASS INDEX: 38.74 KG/M2 | WEIGHT: 240 LBS | TEMPERATURE: 97.8 F | SYSTOLIC BLOOD PRESSURE: 135 MMHG | RESPIRATION RATE: 16 BRPM | OXYGEN SATURATION: 99 % | DIASTOLIC BLOOD PRESSURE: 82 MMHG

## 2022-07-23 PROCEDURE — 6370000000 HC RX 637 (ALT 250 FOR IP): Performed by: EMERGENCY MEDICINE

## 2022-07-23 RX ORDER — OXYCODONE HYDROCHLORIDE AND ACETAMINOPHEN 5; 325 MG/1; MG/1
1 TABLET ORAL EVERY 6 HOURS PRN
Qty: 12 TABLET | Refills: 0 | Status: SHIPPED | OUTPATIENT
Start: 2022-07-23 | End: 2022-07-26

## 2022-07-23 RX ORDER — ONDANSETRON 4 MG/1
4 TABLET, ORALLY DISINTEGRATING ORAL ONCE
Status: COMPLETED | OUTPATIENT
Start: 2022-07-23 | End: 2022-07-23

## 2022-07-23 RX ORDER — TRAMADOL HYDROCHLORIDE 50 MG/1
50 TABLET ORAL ONCE
Status: COMPLETED | OUTPATIENT
Start: 2022-07-23 | End: 2022-07-23

## 2022-07-23 RX ORDER — OXYCODONE HYDROCHLORIDE AND ACETAMINOPHEN 5; 325 MG/1; MG/1
2 TABLET ORAL ONCE
Status: DISCONTINUED | OUTPATIENT
Start: 2022-07-23 | End: 2022-07-23 | Stop reason: HOSPADM

## 2022-07-23 RX ORDER — TRAMADOL HYDROCHLORIDE 50 MG/1
50 TABLET ORAL EVERY 6 HOURS PRN
Qty: 12 TABLET | Refills: 0 | Status: SHIPPED | OUTPATIENT
Start: 2022-07-23 | End: 2022-07-26

## 2022-07-23 RX ORDER — ONDANSETRON 4 MG/1
4 TABLET, ORALLY DISINTEGRATING ORAL EVERY 8 HOURS PRN
Qty: 14 TABLET | Refills: 0 | Status: SHIPPED | OUTPATIENT
Start: 2022-07-23

## 2022-07-23 RX ADMIN — TRAMADOL HYDROCHLORIDE 50 MG: 50 TABLET, COATED ORAL at 01:11

## 2022-07-23 RX ADMIN — ONDANSETRON 4 MG: 4 TABLET, ORALLY DISINTEGRATING ORAL at 01:02

## 2022-07-23 NOTE — ED PROVIDER NOTES
2000 \[facility]"" ED  EMERGENCY DEPARTMENT ENCOUNTER      Pt Name: Sonido Vieira  MRN: [id number]  [place] 1975  Date of evaluation: 7/22/2022  Provider: Carter Ormond, DO        HISTORY OF PRESENT ILLNESS    Sonido Vieira is a 56 y.o. female per chart review has ah/o HLD, migraine. She presents with shoulder pain. She states she has chronic pain in the shoulder and today reinjured it. The history is provided by the patient. Shoulder Problem  Location:  Shoulder  Injury: yes    Mechanism of injury: fall    Fall:     Fall occurred:  Standing    Impact surface:  [address] of impact:  Outstretched arms    Entrapped after fall: no    Pain details:     Quality:  Aching    Radiates to:  Does not radiate    Severity:  Moderate    Onset quality:  Sudden    Timing:  Constant    Progression:  Unchanged  Handedness:  Right-handed  Dislocation: no    Foreign body present:  No foreign bodies  Tetanus status:  Unknown  Prior injury to area:  Yes  Relieved by:  Elevation, ice and being still  Worsened by:  Exercise, movement and stretching area  Ineffective treatments:  NSAIDs, ice and immobilization  Associated symptoms: decreased range of motion    Associated symptoms: no back pain, no fever and no neck pain           REVIEW OF SYSTEMS       Review of Systems   Constitutional:  Negative for chills and fever. HENT:  Negative for ear pain and sore throat. Eyes:  Negative for discharge and redness. Respiratory:  Negative for cough and shortness of breath. Cardiovascular:  Negative for chest pain and palpitations. Gastrointestinal:  Negative for abdominal pain, nausea and vomiting. Genitourinary:  Negative for difficulty urinating and dysuria. Musculoskeletal:  Positive for arthralgias. Negative for back pain and neck pain. Skin:  Negative for rash and wound. Neurological:  Negative for dizziness and syncope. Psychiatric/Behavioral:  Negative for confusion. The patient is not nervous/anxious. All other systems reviewed and are negative. Except as noted above the remainder of the review of systems was reviewed and negative. PAST MEDICAL HISTORY     Past Medical History:   Diagnosis Date    HLD (hyperlipidemia)     Migraine with aura and without status migrainosus, not intractable          SURGICAL HISTORY       Past Surgical History:   Procedure Laterality Date    HAND SURGERY Right     Middle finger trigger finger release    TONSILLECTOMY           CURRENT MEDICATIONS       Discharge Medication List as of 7/23/2022  1:51 AM        CONTINUE these medications which have NOT CHANGED    Details   methylphenidate (RITALIN) 10 MG tablet Take one tablet in the morning and one tablet at 2 pm., Disp-60 tablet, R-0Normal      Na Sulfate-K Sulfate-Mg Sulf (SUPREP) 17.5-3.13-1.6 GM/177ML SOLN solution As directed, Disp-1 each, R-0Normal      etonogestrel-ethinyl estradiol (NUVARING) 0.12-0.015 MG/24HR vaginal ring Vaginal: One ring, inserted vaginally and left in place continuously for 3 consecutive weeks, then removed for 1 week. A new ring is inserted 7 days after the last was removed, Disp-3 each, R-3Normal      gabapentin (NEURONTIN) 300 MG capsule Take 4 capsules by mouth 2 times daily for 30 days. , Disp-240 capsule, R-0Normal      busPIRone (BUSPAR) 5 MG tablet Take 1 tablet by mouth 2 times daily, Disp-60 tablet, R-2Normal      dicyclomine (BENTYL) 10 MG capsule Take 1 capsule by mouth 3 times daily as needed (abdominal pain), Disp-90 capsule, R-1Normal      pantoprazole (PROTONIX) 20 MG tablet Take 2 tablets by mouth daily, Disp-60 tablet, R-0Normal      !! ondansetron (ZOFRAN ODT) 4 MG disintegrating tablet Take 1 tablet by mouth every 8 hours as needed for Nausea, Disp-20 tablet, R-0Print      cyclobenzaprine (FLEXERIL) 10 MG tablet Take 10 mg by mouth 2 times daily as needed for Muscle spasms Historical Med       !! - Potential duplicate medications found. Please discuss with provider. ALLERGIES     Nickel, Other, Oxycodone-acetaminophen, Neosporin  [bacitracin-polymyxin b], Vitamin b12, Azithromycin, and Vitamin b complex  [b complex-b12]    FAMILY HISTORY       Family History   Problem Relation Age of Onset    No Known Problems Mother     Diabetes Father     Hypertension Father           SOCIAL HISTORY       Social History     Socioeconomic History    Marital status: Single   Tobacco Use    Smoking status: Former    Smokeless tobacco: Current     Types: Chew   Substance and Sexual Activity    Alcohol use: Yes    Drug use: Never     Social Determinants of Health     Financial Resource Strain: Low Risk     Difficulty of Paying Living Expenses: Not hard at all   Food Insecurity: No Food Insecurity    Worried About [employer] in the Last Year: Never true    Ran Out of Food in the Last Year: Never true         PHYSICAL EXAM       ED Triage Vitals [07/22/22 2228]   BP Temp Temp Source Heart Rate Resp SpO2 Height Weight   (!) 137/106 97.8 °F (36.6 °C) Oral (!) 110 18 98 % -- 240 lb (108.9 kg)       Physical Exam  Vitals and nursing note reviewed. Constitutional:       Appearance: Normal appearance. HENT:      Head: Normocephalic and atraumatic. Right Ear: Tympanic membrane normal.      Left Ear: Tympanic membrane normal.      Nose: Nose normal.      Mouth/Throat:      Mouth: Mucous membranes are moist.      Pharynx: Oropharynx is clear. Eyes:      General: Lids are normal.      Extraocular Movements: Extraocular movements intact. Conjunctiva/sclera: Conjunctivae normal.      Pupils: Pupils are equal, round, and reactive to light. Cardiovascular:      Rate and Rhythm: Regular rhythm. Tachycardia present. Pulses: Normal pulses. Heart sounds: Normal heart sounds. Pulmonary:      Effort: Pulmonary effort is normal.      Breath sounds: Normal breath sounds. Abdominal:      General: Abdomen is flat. Bowel sounds are normal.      Palpations: Abdomen is soft. Musculoskeletal:         General: Tenderness present. Right shoulder: Tenderness and bony tenderness present. Decreased range of motion. Cervical back: Full passive range of motion without pain, normal range of motion and neck supple. Skin:     General: Skin is warm. Capillary Refill: Capillary refill takes less than 2 seconds. Neurological:      General: No focal deficit present. Mental Status: She is alert and oriented to person, place, and time. Deep Tendon Reflexes: Reflexes are normal and symmetric. Psychiatric:         Attention and Perception: Attention and perception normal.         Mood and Affect: Mood normal.         Behavior: Behavior normal. Behavior is cooperative. LABS:  Labs Reviewed - No data to display      MDM:   Vitals:    Vitals:    07/22/22 2228 07/23/22 0155   BP: (!) 137/106 135/82   Pulse: (!) 110 99   Resp: 18 16   Temp: 97.8 °F (36.6 °C)    TempSrc: Oral    SpO2: 98% 99%   Weight: 240 lb (108.9 kg)        MDM  Number of Diagnoses or Management Options  Closed fracture of proximal end of right humerus, unspecified fracture morphology, initial encounter  Diagnosis management comments: Patient presents with right shoulder pain. Xray of the right shoulder ordered. Dilaudid 1mg IM and zofran 4mg ODT ordered for pain. Right shoulder xray: several pathologic fractures. She is to follow up with orthopedics. She was given rx for pain medication and a sling. She will follow up if her pain changes or gets worse to the ER. Amount and/or Complexity of Data Reviewed  Tests in the radiology section of CPT®: ordered and reviewed         XR SHOULDER RIGHT (MIN 2 VIEWS)   Final Result      PERMEATIVE BONE DESTRUCTION OF THE MID TO PROXIMAL RIGHT HUMERAL DIAPHYSIS WITH MINIMALLY DISPLACED OBLIQUE PATHOLOGIC FRACTURES PROXIMALLY AND DISTALLY.                        Di Found, DO     The lab results, radiology and test results were reviewed with the patient and family. The patient will follow up in 2 days with their primary care doctor. If their symptoms change or get worse they will return to the ER. CRITICAL CARE TIME   Total CriticalCare time was 0 minutes, excluding separately reportable procedures. There was a high probability of clinically significant/life threatening deterioration in the patient's condition which required my urgent intervention. PROCEDURES:  Unlessotherwise noted below, none     Procedures      FINAL IMPRESSION      1.  Closed fracture of proximal end of right humerus, unspecified fracture morphology, initial encounter          DISPOSITION/PLAN   DISPOSITION Decision To Discharge 07/23/2022 12:40:06 AM          CECIL Corrales DO (electronically signed)  Attending Emergency Physician          Micheline Cook DO  07/26/22 1200

## 2022-07-23 NOTE — ED NOTES
Pt medicated per physician order. Pt grunting and appears to be in distress.      David Sharpe RN  07/23/22 7565

## 2022-07-23 NOTE — ED TRIAGE NOTES
Pt. Presents with right shoulder pain. She has been going through PT for a previous shoulder injury. She tripped over her shoe today and fell into the wall, hitting the right shoulder into the wall and causing new increased pain.

## 2022-07-27 ENCOUNTER — APPOINTMENT (OUTPATIENT)
Dept: PHYSICAL THERAPY | Age: 47
End: 2022-07-27
Payer: COMMERCIAL

## 2022-07-29 ENCOUNTER — APPOINTMENT (OUTPATIENT)
Dept: PHYSICAL THERAPY | Age: 47
End: 2022-07-29
Payer: COMMERCIAL

## 2022-07-31 DIAGNOSIS — K76.9 LIVER LESION: Primary | ICD-10-CM

## 2022-09-04 ENCOUNTER — HOSPITAL ENCOUNTER (EMERGENCY)
Age: 47
Discharge: HOME | End: 2022-09-05
Attending: EMERGENCY MEDICINE
Payer: COMMERCIAL

## 2022-09-04 ENCOUNTER — APPOINTMENT (OUTPATIENT)
Dept: CT IMAGING | Age: 47
End: 2022-09-04
Payer: COMMERCIAL

## 2022-09-04 DIAGNOSIS — R10.12 LEFT UPPER QUADRANT ABDOMINAL PAIN: Primary | ICD-10-CM

## 2022-09-04 LAB
ALBUMIN SERPL-MCNC: 4.2 G/DL (ref 3.5–4.6)
ALP BLD-CCNC: 155 U/L (ref 40–130)
ALT SERPL-CCNC: 15 U/L (ref 0–33)
ANION GAP SERPL CALCULATED.3IONS-SCNC: 15 MEQ/L (ref 9–15)
APTT: 29.2 SEC (ref 24.4–36.8)
AST SERPL-CCNC: 15 U/L (ref 0–35)
BACTERIA: ABNORMAL /HPF
BASOPHILS ABSOLUTE: 0.1 K/UL (ref 0–0.1)
BASOPHILS RELATIVE PERCENT: 0.3 % (ref 0.1–1.2)
BILIRUB SERPL-MCNC: 0.3 MG/DL (ref 0.2–0.7)
BILIRUBIN URINE: NEGATIVE
BLOOD, URINE: NORMAL
BUN BLDV-MCNC: 15 MG/DL (ref 6–20)
CALCIUM SERPL-MCNC: 9.9 MG/DL (ref 8.5–9.9)
CHLORIDE BLD-SCNC: 105 MEQ/L (ref 95–107)
CLARITY: CLEAR
CO2: 22 MEQ/L (ref 20–31)
COLOR: YELLOW
CREAT SERPL-MCNC: 0.68 MG/DL (ref 0.5–0.9)
EOSINOPHILS ABSOLUTE: 0 K/UL (ref 0–0.4)
EOSINOPHILS RELATIVE PERCENT: 0 % (ref 0.7–5.8)
EPITHELIAL CELLS, UA: ABNORMAL /HPF
GFR AFRICAN AMERICAN: >60
GFR NON-AFRICAN AMERICAN: >60
GLOBULIN: 3 G/DL (ref 2.3–3.5)
GLUCOSE BLD-MCNC: 194 MG/DL (ref 70–99)
GLUCOSE URINE: NEGATIVE MG/DL
HCG(URINE) PREGNANCY TEST: NEGATIVE
HCT VFR BLD CALC: 35.9 % (ref 37–47)
HEMOGLOBIN: 11.7 G/DL (ref 11.2–15.7)
IMMATURE GRANULOCYTES #: 4.3 K/UL
IMMATURE GRANULOCYTES %: 12 %
INR BLD: 0.9
KETONES, URINE: NEGATIVE MG/DL
LEUKOCYTE ESTERASE, URINE: NEGATIVE
LIPASE: 25 U/L (ref 12–95)
LYMPHOCYTES ABSOLUTE: 0.8 K/UL (ref 1.2–3.7)
LYMPHOCYTES RELATIVE PERCENT: 2.1 %
MCH RBC QN AUTO: 26.5 PG (ref 25.6–32.2)
MCHC RBC AUTO-ENTMCNC: 32.6 % (ref 32.2–35.5)
MCV RBC AUTO: 81.4 FL (ref 79.4–94.8)
MONOCYTES ABSOLUTE: 1 K/UL (ref 0.2–0.9)
MONOCYTES RELATIVE PERCENT: 2.8 % (ref 4.7–12.5)
NEUTROPHILS ABSOLUTE: 29.4 K/UL (ref 1.6–6.1)
NEUTROPHILS RELATIVE PERCENT: 82.8 % (ref 34–71.1)
NITRITE, URINE: NEGATIVE
PDW BLD-RTO: 12.9 % (ref 11.7–14.4)
PH UA: 6.5 (ref 5–9)
PLATELET # BLD: 314 K/UL (ref 182–369)
POTASSIUM REFLEX MAGNESIUM: 3.6 MEQ/L (ref 3.4–4.9)
PROTEIN UA: NEGATIVE MG/DL
PROTHROMBIN TIME: 12.2 SEC (ref 12.3–14.9)
RBC # BLD: 4.41 M/UL (ref 3.93–5.22)
RBC UA: ABNORMAL /HPF (ref 0–2)
SODIUM BLD-SCNC: 142 MEQ/L (ref 135–144)
SPECIFIC GRAVITY UA: 1.01 (ref 1–1.03)
TOTAL PROTEIN: 7.2 G/DL (ref 6.3–8)
TROPONIN: <0.01 NG/ML (ref 0–0.01)
URINE REFLEX TO CULTURE: NORMAL
UROBILINOGEN, URINE: 0.2 E.U./DL
WBC # BLD: 35.5 K/UL (ref 4–10)
WBC UA: ABNORMAL /HPF (ref 0–5)

## 2022-09-04 PROCEDURE — 6360000004 HC RX CONTRAST MEDICATION: Performed by: EMERGENCY MEDICINE

## 2022-09-04 PROCEDURE — 2580000003 HC RX 258: Performed by: EMERGENCY MEDICINE

## 2022-09-04 PROCEDURE — 84484 ASSAY OF TROPONIN QUANT: CPT

## 2022-09-04 PROCEDURE — 36415 COLL VENOUS BLD VENIPUNCTURE: CPT

## 2022-09-04 PROCEDURE — 74177 CT ABD & PELVIS W/CONTRAST: CPT

## 2022-09-04 PROCEDURE — 84703 CHORIONIC GONADOTROPIN ASSAY: CPT

## 2022-09-04 PROCEDURE — 80053 COMPREHEN METABOLIC PANEL: CPT

## 2022-09-04 PROCEDURE — 85610 PROTHROMBIN TIME: CPT

## 2022-09-04 PROCEDURE — 85730 THROMBOPLASTIN TIME PARTIAL: CPT

## 2022-09-04 PROCEDURE — 83690 ASSAY OF LIPASE: CPT

## 2022-09-04 PROCEDURE — 99285 EMERGENCY DEPT VISIT HI MDM: CPT

## 2022-09-04 PROCEDURE — 81001 URINALYSIS AUTO W/SCOPE: CPT

## 2022-09-04 PROCEDURE — 85025 COMPLETE CBC W/AUTO DIFF WBC: CPT

## 2022-09-04 RX ORDER — PREDNISONE 20 MG/1
20 TABLET ORAL DAILY
COMMUNITY

## 2022-09-04 RX ORDER — 0.9 % SODIUM CHLORIDE 0.9 %
1000 INTRAVENOUS SOLUTION INTRAVENOUS ONCE
Status: COMPLETED | OUTPATIENT
Start: 2022-09-04 | End: 2022-09-05

## 2022-09-04 RX ORDER — ALLOPURINOL 300 MG/1
300 TABLET ORAL DAILY
COMMUNITY

## 2022-09-04 RX ADMIN — SODIUM CHLORIDE 1000 ML: 900 INJECTION, SOLUTION INTRAVENOUS at 23:31

## 2022-09-04 RX ADMIN — IOPAMIDOL 100 ML: 755 INJECTION, SOLUTION INTRAVENOUS at 23:48

## 2022-09-05 VITALS
OXYGEN SATURATION: 98 % | TEMPERATURE: 98.2 F | SYSTOLIC BLOOD PRESSURE: 150 MMHG | WEIGHT: 237 LBS | BODY MASS INDEX: 38.09 KG/M2 | DIASTOLIC BLOOD PRESSURE: 86 MMHG | HEIGHT: 66 IN | RESPIRATION RATE: 16 BRPM | HEART RATE: 98 BPM

## 2022-09-05 NOTE — ED NOTES
Pt discharged per physician order, Pt denies questions at this time. Pt ambulated to lobby with steady gait. No distress noted. Follow up appointment, and prescriptions discussed with patient.        Bernice Roque RN  09/05/22 2655

## 2022-09-05 NOTE — ED NOTES
Pt arrives to ER with complaints of LUQ abdominal pain that began at noon today. Pt states she is currently taking Neulasta (pegfilgrastim) for treatment for bone cancer. And Pt states she was told that if she had LUQ pain she should seek medical advice. No other distress noted at this time.  Pain is dull and 5/10     Hussein Harper RN  09/04/22 9002

## 2022-09-05 NOTE — DISCHARGE INSTRUCTIONS
Return to the Emergency Department immediately if you develop worsening symptoms, or you have any other concerns. Please follow up with your family doctor and oncologist in 1-2 days.

## 2022-09-05 NOTE — ED PROVIDER NOTES
eMERGENCY dEPARTMENT eNCOUnter      [facility]    Chief Complaint   Patient presents with    Abdominal Pain       HPI    Carlos Enrique Sanchez is a 56 y.o. female with history of hyperlipidemia, migraine, post pathology for fracture of the right humerus, who presentsto ED from home  By private car  With complaint of abdominal pain  Onset 2 days  Intensity of symptoms moderate  Location of symptoms left upper quadrant  Patient has lymphoma of the right humerus for which she follows at [address] [place] [address]  Patient is currently taking R-CHOP chemotherapy and Neulasta and is wondering if she can get a CT because of the side effects of the medicine. Patient denies any nausea vomiting diarrhea. She denies any fever chills chest pain or shortness of breath. PAST MEDICAL HISTORY    Past Medical History:   Diagnosis Date    HLD (hyperlipidemia)     Migraine with aura and without status migrainosus, not intractable        SURGICAL HISTORY    Past Surgical History:   Procedure Laterality Date    HAND SURGERY Right     Middle finger trigger finger release    TONSILLECTOMY         CURRENT MEDICATIONS    Current Outpatient Rx   Medication Sig Dispense Refill    pegfilgrastim (NEULASTA) 6 MG/0.6ML injection Inject 6 mg into the skin once      allopurinol (ZYLOPRIM) 300 MG tablet Take 300 mg by mouth daily      predniSONE (DELTASONE) 20 MG tablet Take 20 mg by mouth daily      ondansetron (ZOFRAN ODT) 4 MG disintegrating tablet Take 1 tablet by mouth every 8 hours as needed for Nausea or Vomiting 14 tablet 0    Na Sulfate-K Sulfate-Mg Sulf (SUPREP) 17.5-3.13-1.6 GM/177ML SOLN solution As directed (Patient not taking: Reported on 7/19/2022) 1 each 0    etonogestrel-ethinyl estradiol (NUVARING) 0.12-0.015 MG/24HR vaginal ring Vaginal: One ring, inserted vaginally and left in place continuously for 3 consecutive weeks, then removed for 1 week.  A new ring is inserted 7 days after the last was removed 3 each 3    gabapentin (NEURONTIN) 300 MG capsule Take 4 capsules by mouth 2 times daily for 30 days.  240 capsule 0    busPIRone (BUSPAR) 5 MG tablet Take 1 tablet by mouth 2 times daily 60 tablet 2    dicyclomine (BENTYL) 10 MG capsule Take 1 capsule by mouth 3 times daily as needed (abdominal pain) 90 capsule 1    pantoprazole (PROTONIX) 20 MG tablet Take 2 tablets by mouth daily (Patient not taking: Reported on 7/19/2022) 60 tablet 0    ondansetron (ZOFRAN ODT) 4 MG disintegrating tablet Take 1 tablet by mouth every 8 hours as needed for Nausea (Patient not taking: Reported on 7/19/2022) 20 tablet 0    cyclobenzaprine (FLEXERIL) 10 MG tablet Take 10 mg by mouth 2 times daily as needed for Muscle spasms          ALLERGIES    Allergies   Allergen Reactions    Nickel Hives and Itching    Other      Other reaction(s): Dizziness    Oxycodone-Acetaminophen Other (See Comments)     nausea      Neosporin  [Bacitracin-Polymyxin B] Hives    Vitamin B12 Hives    Azithromycin Hives    Vitamin B Complex  [B Complex-B12] Hives       FAMILY HISTORY    Family History   Problem Relation Age of Onset    No Known Problems Mother     Diabetes Father     Hypertension Father        SOCIAL HISTORY    Social History     Socioeconomic History    Marital status: Single     Spouse name: None    Number of children: None    Years of education: None    Highest education level: None   Tobacco Use    Smoking status: Former    Smokeless tobacco: Current     Types: Chew   Substance and Sexual Activity    Alcohol use: Yes    Drug use: Never     Social Determinants of Health     Financial Resource Strain: Low Risk     Difficulty of Paying Living Expenses: Not hard at all   Food Insecurity: No Food Insecurity    Worried About Running Out of Food in the Last Year: Never true    Ran Out of Food in the Last Year: Never true       REVIEW OF SYSTEMS    Constitutional:  Denies fever, chills, weight loss or weakness   Eyes:  Denies photophobia or discharge   HENT:  Denies sore throat or ear pain   Respiratory:  Denies cough or shortness of breath   Cardiovascular:  Denies chest pain, palpitations or swelling   GI: Complains of abdominal pain, but denies  nausea, vomiting, or diarrhea   Musculoskeletal:  Denies back pain   Skin:  Denies rash   Neurologic:  Denies headache, focal weakness or sensory changes   Endocrine:  Denies polyuria or polydypsia   Lymphatic:  Denies swollen glands   Psychiatric:  Denies depression, suicidal ideation or homicidal ideation   All systems negative except as marked. PHYSICAL EXAM    VITAL SIGNS: BP (!) 150/86   Pulse 98   Temp 98.2 °F (36.8 °C) (Oral)   Resp 16   Ht 5' 6\" (1.676 m)   Wt 237 lb (107.5 kg)   SpO2 98%   BMI 38.25 kg/m²    Constitutional: Morbidly obese, mild acute distress, Non-toxic appearance. HENT:  Normocephalic, Atraumatic, Bilateral external ears normal, Oropharynx moist, No oral exudates, Nose normal. Neck- Normal range of motion, No tenderness, Supple, No stridor. Eyes:  PERRL, EOMI, Conjunctiva normal, No discharge. Respiratory:  Normal breath sounds, No respiratory distress, No wheezing, No chest tenderness. Cardiovascular: Tachycardic, Normal rhythm, No murmurs, No rubs, No gallops. GI:  Bowel sounds normal, Soft, left upper quadrant tenderness, No masses, No pulsatile masses. No rebound or rigidity. : No CVA tenderness. Musculoskeletal:  Intact distal pulses, No edema, No tenderness, No cyanosis, No clubbing. Good range of motion in all major joints. No tenderness to palpation or major deformities noted. Back- No tenderness. Integument:  Warm, Dry, No erythema, No rash. Lymphatic:  No lymphadenopathy noted. Neurologic:  Alert & oriented x 3, Normal motor function, Normal sensory function, No focal deficits noted.    Psychiatric:  Affect normal, Judgment normal, Mood normal.       RADIOLOGY    CT ABDOMEN PELVIS W IV CONTRAST Additional Contrast? None   Final Result     No acute CT findings in the abdomen or pelvis. REEVALUATION   Patient was updated the results of labs and Radiology. Pain control adeqaute. Tolerating PO      Labs  Labs Reviewed   CBC WITH AUTO DIFFERENTIAL - Abnormal; Notable for the following components:       Result Value    WBC 35.5 (*)     Hematocrit 35.9 (*)     Neutrophils % 82.8 (*)     Monocytes % 2.8 (*)     Eosinophils % 0.0 (*)     Neutrophils Absolute 29.4 (*)     Lymphocytes Absolute 0.8 (*)     Monocytes Absolute 1.0 (*)     All other components within normal limits   COMPREHENSIVE METABOLIC PANEL W/ REFLEX TO MG FOR LOW K - Abnormal; Notable for the following components:    Glucose 194 (*)     Alkaline Phosphatase 155 (*)     All other components within normal limits   PROTIME-INR - Abnormal; Notable for the following components:    Protime 12.2 (*)     All other components within normal limits   MICROSCOPIC URINALYSIS - Abnormal; Notable for the following components:    RBC, UA 3-5 (*)     Bacteria, UA MANY (*)     All other components within normal limits   PREGNANCY, URINE   URINALYSIS WITH REFLEX TO CULTURE   LIPASE   TROPONIN   APTT             Summation      Patient Course:     ED Medications administered this visit:    Medications   0.9 % sodium chloride bolus (0 mLs IntraVENous Stopped 9/5/22 0107)   iopamidol (ISOVUE-370) 76 % injection 100 mL (100 mLs IntraVENous Given 9/4/22 7890)       New Prescriptions from this visit:    Discharge Medication List as of 9/5/2022  1:00 AM          Follow-up:  Pia Schmidt MD  [address]  [phone]    Call in 1 day      your oncologist    Call in 1 day        Final Impression:   1.  Left upper quadrant abdominal pain               (Please note that portions of this note were completed with a voice recognition program.  Efforts were made to edit the dictations but occasionally words are mis-transcribed.)          Inna Arellano MD  09/06/22 7829

## 2022-09-22 DIAGNOSIS — F07.81 POST-CONCUSSION SYNDROME: ICD-10-CM

## 2022-09-22 RX ORDER — GABAPENTIN 300 MG/1
1200 CAPSULE ORAL 2 TIMES DAILY
Qty: 240 CAPSULE | Refills: 0 | Status: SHIPPED | OUTPATIENT
Start: 2022-09-22 | End: 2022-10-22

## 2022-10-10 ENCOUNTER — HOSPITAL ENCOUNTER (OUTPATIENT)
Dept: PHYSICAL THERAPY | Age: 47
Setting detail: THERAPIES SERIES
Discharge: HOME | End: 2022-10-10
Payer: COMMERCIAL

## 2022-10-10 DIAGNOSIS — Z30.44 ENCOUNTER FOR SURVEILLANCE OF VAGINAL RING HORMONAL CONTRACEPTIVE DEVICE: ICD-10-CM

## 2022-10-10 PROCEDURE — 97110 THERAPEUTIC EXERCISES: CPT

## 2022-10-10 PROCEDURE — 97162 PT EVAL MOD COMPLEX 30 MIN: CPT

## 2022-10-10 RX ORDER — ETONOGESTREL AND ETHINYL ESTRADIOL 11.7; 2.7 MG/1; MG/1
INSERT, EXTENDED RELEASE VAGINAL
Qty: 3 EACH | Refills: 3 | Status: SHIPPED | OUTPATIENT
Start: 2022-10-10

## 2022-10-10 ASSESSMENT — PAIN DESCRIPTION - PAIN TYPE: TYPE: SURGICAL PAIN

## 2022-10-10 ASSESSMENT — PAIN DESCRIPTION - DESCRIPTORS: DESCRIPTORS: ACHING;SHARP

## 2022-10-10 ASSESSMENT — PAIN DESCRIPTION - LOCATION: LOCATION: ARM

## 2022-10-10 ASSESSMENT — PAIN DESCRIPTION - FREQUENCY: FREQUENCY: CONTINUOUS

## 2022-10-10 ASSESSMENT — PAIN SCALES - GENERAL: PAINLEVEL_OUTOF10: 4

## 2022-10-10 ASSESSMENT — PAIN DESCRIPTION - ORIENTATION: ORIENTATION: RIGHT

## 2022-10-10 NOTE — PROGRESS NOTES
Physical Therapy: Initial Evaluation    Patient: Faizan Garnett (34 y.o. female)   Examination Date:   Plan of Care Certification Period: 10/10/2022 to 11/10/22      :  1975 ;    Confirmed: Yes MRN: [id number]  CSN: [id number]   Insurance: Payor: [employer] / Plan: [employer] LANG - EXCHANGE / Product Type: *No Product type* /   Insurance ID: [id number] - (Commercial) Secondary Insurance (if applicable):    Referring Physician: MD Dr Crissy Dowell   PCP: Hai Fong MD Visits to Date/Visits Approved:     No Show/Cancelled Appts:      Medical Diagnosis: Pathological fracture, unspecified humerus, initial encounter for fracture [L80.600O] s/p ORIF Right humerus due to pathological fx (recently dx with lymphoma)  Treatment Diagnosis: s/p ORIF right humerus due to 2 pathological fxs (dx with lymphoma)     PERTINENT MEDICAL HISTORY      Self reported health status[de-identified] Good    Medical History: Chart Reviewed: Yes   Past Medical History:   Diagnosis Date    HLD (hyperlipidemia)     Migraine with aura and without status migrainosus, not intractable      Surgical History:   Past Surgical History:   Procedure Laterality Date    HAND SURGERY Right     Middle finger trigger finger release    IR TUNNELED CATHETER PLACEMENT GREATER THAN 5 YEARS  2022    IR TUNNELED CATHETER PLACEMENT GREATER THAN 5 YEARS    TONSILLECTOMY         Medications:   Current Outpatient Medications:     gabapentin (NEURONTIN) 300 MG capsule, Take 4 capsules by mouth 2 times daily for 30 days. , Disp: 240 capsule, Rfl: 0    pegfilgrastim (NEULASTA) 6 MG/0.6ML injection, Inject 6 mg into the skin once, Disp: , Rfl:     allopurinol (ZYLOPRIM) 300 MG tablet, Take 300 mg by mouth daily, Disp: , Rfl:     predniSONE (DELTASONE) 20 MG tablet, Take 20 mg by mouth daily, Disp: , Rfl:     ondansetron (ZOFRAN ODT) 4 MG disintegrating tablet, Take 1 tablet by mouth every 8 hours as needed for Nausea or Vomiting, Disp: 14 tablet, Rfl: 0    Na Sulfate-K Sulfate-Mg Sulf (SUPREP) 17.5-3.13-1.6 GM/177ML SOLN solution, As directed (Patient not taking: Reported on 7/19/2022), Disp: 1 each, Rfl: 0    etonogestrel-ethinyl estradiol (NUVARING) 0.12-0.015 MG/24HR vaginal ring, Vaginal: One ring, inserted vaginally and left in place continuously for 3 consecutive weeks, then removed for 1 week. A new ring is inserted 7 days after the last was removed, Disp: 3 each, Rfl: 3    busPIRone (BUSPAR) 5 MG tablet, Take 1 tablet by mouth 2 times daily, Disp: 60 tablet, Rfl: 2    dicyclomine (BENTYL) 10 MG capsule, Take 1 capsule by mouth 3 times daily as needed (abdominal pain), Disp: 90 capsule, Rfl: 1    pantoprazole (PROTONIX) 20 MG tablet, Take 2 tablets by mouth daily (Patient not taking: Reported on 7/19/2022), Disp: 60 tablet, Rfl: 0    ondansetron (ZOFRAN ODT) 4 MG disintegrating tablet, Take 1 tablet by mouth every 8 hours as needed for Nausea (Patient not taking: Reported on 7/19/2022), Disp: 20 tablet, Rfl: 0    cyclobenzaprine (FLEXERIL) 10 MG tablet, Take 10 mg by mouth 2 times daily as needed for Muscle spasms , Disp: , Rfl:   Allergies: Nickel, Other, Oxycodone-acetaminophen, Neosporin  [bacitracin-polymyxin b], Vitamin b12, Azithromycin, and Vitamin b complex  [b complex-b12]      SUBJECTIVE EXAMINATION      ,      Family/Caregiver Present: No    Subjective History: Onset Date: 08/15/22  Subjective: Pt reports right arm pain 4/10 \"achy, dull and swollen\"  Additional Pertinent Hx (if applicable): Pt reports having PT for her right shoulder and neck and was doing well and was progressing to strengthening. Pt was at home and tripped walking in her home and braced on a closed door with her right arm and had intense arm pain. Xray showed 2 pathological fx of her right humerus with a subsequent dx with cancer (lymphoma). Pt had ORIF of her right humerus on 8/15/22 and is currently undergoing chemo and radiation.  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 Pt is healing well and recevied the okay to start PT to build up her ROM, strength and endurance. Learning/Language:       Pain Screening   Pain Screening  Patient Currently in Pain: Yes  Pain Assessment: 0-10  Pain Level: 4  Patient's Stated Pain Goal: 0 - No pain  Pain Type: Surgical pain  Pain Location: Arm  Pain Orientation: Right  Pain Descriptors: Aching, Sharp  Pain Frequency: Continuous           Left AROM  Right AROM              AROM RUE (degrees)  R Shoulder Flexion 0-180: 0-105 AROM in standing  R Shoulder Extension 0-45: 0-55 degrees in standing  R Shoulder ABduction 0-180: 0-145 degrees  R Shoulder Int Rotation  0-70: Pt able to place her right hand to her right lateral buttock  R Shoulder Ext Rotation 0-90: 0-55 degrees with elbow       Left Strength  Right Strength              Strength RUE  R Shoulder Flexion: 3/5, 3-/5  R Shoulder Extension: 3/5  R Shoulder ABduction: 3/5, 3-/5  R Shoulder Internal Rotation: 3/5, 3+/5  R Shoulder External Rotation: 3/5, 3+/5  R Elbow Flexion: 3/5  R Elbow Extension: 4-/5        Special Tests: Spadi= 34%        ASSESSMENT     Impression: Assessment: Pt is a 57 yo female who was undergoing PT for her right shoulder pain and was doing well and was progressing to strengthening. Pt was at home and tripped walking in her home and braced on a closed door with her right arm and had intense arm pain. Xray showed 2 pathological fx of her right humerus with a subsequent dx with cancer (lymphoma). Pt had ORIF of her right humerus on 8/15/22 and is currently undergoing chemo and radiation. 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 Pt is healing well and given order to start PT. PT completed evaluation with pt reports increased right shoulder discomfort with endrange movements. Pt was instructed in ROM and gentle strengthening exercises use pain as her guide.  Pt was given copy of HEP and CP post tx session with plans to gently progress with ROM, strengthening and improving pts R UE mobility. Body Structures, Functions, Activity Limitations Requiring Skilled Therapeutic Intervention: Decreased functional mobility , Decreased ROM, Decreased strength, Decreased endurance, Increased pain    Statement of Medical Necessity: Physical Therapy is both indicated and medically necessary as outlined in the POC to increase the likelihood of meeting the functionally related goals stated below.      Patient's Activity Tolerance:        Patient's rehabilitation potential/prognosis is considered to be: Good    Factors which may impact rehabilitation potential include:          GOALS   Patient Goal(s): Be able to use her right arm overhead  Short Term Goals Completed by 1-2 weeks from date of evaluation Goal Status   Pt reports having 3/10 or less right shoulder pain with ADLS New   Pt reports able to complete her HEP 3-5x per week New                                                       Long Term Goals Completed by 4-6 weeks from date of evaluation on 10/10/22 Goal Status   Pt reports having 2/10 or less R UE pain while performing ADLS and work duties New   Increase AROM of right shoulder to ksrkjff=698 degrees, abduction =140 degrees and IR= able to reach to her toleraance for improved function New   Increase RUE strength to 4+/5 or >so that pt can perform overhead activities without increased pain New   Improve her Spadi (Shoulder Pain & Disability Index) from 34% disability on eval to <10% by time of DC New   Pt indep with an advanced HEP New                                      TREATMENT PLAN       Requires PT Follow-Up: Yes    Pt. actively involved in establishing Plan of Care and Goals: Yes  Patient/ Caregiver education and instruction:      *Pt dx with Lymphoma in Aug 2022 and is currently undergoing chemo and radiation* NO ESTIM OR US; Okay KT tape, CP, HP        Treatment may include any combination of the following: Strengthening, ROM, Functional mobility training, Manual Therapy - Soft Tissue Mobilization, Pain management, Home exercise program, Safety education & training, Modalities     Frequency / Duration:  Patient to be seen 1-2x per week for 4-6 weeks weeks      Eval Complexity:    Decision Making: Medium Complexity    PT Treatment Completed:  Exercises:      Treatment Reasoning    Exercise 1: Wall slides for flexion and scaption x 5 H10  Exercise 2: Standing shoulder flexion 0# x 10 R UE  Exercise 3: Standing shoulder scaption 0# x 10 RUE                          Therapy Time  Individual Time In:     12:00pm    Individual Time Out:  1:00pm  Minutes:  60 min         Therapist Signature: Andrew Mejia, PT    Date: 27/46/3100     I certify that the above Therapy Services are being furnished while the patient is under my care. I agree with the treatment plan and certify that this therapy is necessary. Physician's Signature:  ___________________________   Date:_______                                                                   Ji Bowie MD        Physician Comments: _______________________________________________    Please sign and return to [facility], [place]. Please fax to the location listed below.  Charles Mccracken for this referral!    [facility]  [address] [place] [address] [address] [address]  Dept: [phone]  Dept Fax: 21 : [phone]

## 2022-10-12 DIAGNOSIS — F07.81 POST-CONCUSSION SYNDROME: ICD-10-CM

## 2022-10-12 RX ORDER — METHYLPHENIDATE HYDROCHLORIDE 10 MG/1
TABLET ORAL
Qty: 60 TABLET | Refills: 0 | Status: SHIPPED | OUTPATIENT
Start: 2022-10-12 | End: 2022-11-28

## 2022-10-12 NOTE — TELEPHONE ENCOUNTER
Pharmacy is requesting medication refill.  Please approve or deny this request.    Rx requested:  Requested Prescriptions     Pending Prescriptions Disp Refills    methylphenidate (RITALIN) 10 MG tablet [Pharmacy Med Name: methylphenidate 10 mg tablet] 60 tablet 0     Sig: TAKE 1 TABLET BY MOUTH EVERY MORNING and TAKE 1 TABLET at 2pm         Last Office Visit:   7/19/2022      Next Visit Date:  Future Appointments   Date Time Provider Marleny Ta   10/13/2022  1:30 PM [facility]   11/29/2022 11:00 AM Nadia Stratton MD [facility]   1/17/2023  2:45 PM Erika Jorge  [address]

## 2022-10-13 ENCOUNTER — HOSPITAL ENCOUNTER (OUTPATIENT)
Dept: PHYSICAL THERAPY | Age: 47
Setting detail: THERAPIES SERIES
Discharge: HOME | End: 2022-10-13
Payer: COMMERCIAL

## 2022-10-13 PROCEDURE — 97110 THERAPEUTIC EXERCISES: CPT

## 2022-10-13 PROCEDURE — 97140 MANUAL THERAPY 1/> REGIONS: CPT

## 2022-10-13 ASSESSMENT — PAIN DESCRIPTION - ORIENTATION: ORIENTATION: RIGHT

## 2022-10-13 ASSESSMENT — PAIN SCALES - GENERAL: PAINLEVEL_OUTOF10: 3

## 2022-10-13 ASSESSMENT — PAIN DESCRIPTION - LOCATION: LOCATION: ARM

## 2022-10-18 ENCOUNTER — HOSPITAL ENCOUNTER (OUTPATIENT)
Dept: PHYSICAL THERAPY | Age: 47
Setting detail: THERAPIES SERIES
Discharge: HOME | End: 2022-10-18
Payer: COMMERCIAL

## 2022-10-18 PROCEDURE — 97110 THERAPEUTIC EXERCISES: CPT

## 2022-10-18 PROCEDURE — 97140 MANUAL THERAPY 1/> REGIONS: CPT

## 2022-10-18 ASSESSMENT — PAIN DESCRIPTION - LOCATION: LOCATION: SHOULDER

## 2022-10-18 ASSESSMENT — PAIN SCALES - GENERAL: PAINLEVEL_OUTOF10: 3

## 2022-10-18 ASSESSMENT — PAIN DESCRIPTION - ORIENTATION: ORIENTATION: RIGHT

## 2022-10-18 NOTE — PROGRESS NOTES
Physical Therapy: Daily Note   Patient: Angelic Mauricio (58 y.o. female)   Examination Date:   Plan of Care/Certification Expiration Date: 11/10/22    No data recorded   :  1975 # of Visits since [facility]:   12   MRN: [id number]  CSN: [id number] Start of Care Date:   6/15/2022   Insurance: Payor: [employer] / Plan: [address] [place] [address] / Product Type: *No Product type* /   Insurance ID: [id number] - (Commercial) Secondary Insurance (if applicable):    Referring Physician: Joy Riojas MD     PCP: Fallon Hubbard MD Visits to Date/Visits Approved: 3 / 12    No Show/Cancelled Appts:   /       Medical Diagnosis: Pathological fracture, unspecified humerus, initial encounter for fracture [B41.396U]    Treatment Diagnosis: s/p ORIF right humerus due to 2 pathological fxs (dx with lymphoma)        SUBJECTIVE EXAMINATION   Pain Level: Pain Screening  Patient Currently in Pain: Yes  Pain Assessment: 0-10  Pain Level: 3  Pain Location: Shoulder  Pain Orientation: Right    Patient Comments: Subjective: Pt having inc light and sound sensitivity today. She states her shoulder fatigues easily. She feels fatigued physically after her last radiation tx yesterday.     HEP Compliance: Good        OBJECTIVE EXAMINATION   Restrictions:  No data recorded No data recorded No data recorded      Left AROM  Right AROM        AROM RUE (degrees)  R Shoulder Flexion 0-180: 0-114 deg supine  R Shoulder ABduction 0-180: 0-110 deg (pain level 8/10 at end range)  R Shoulder Int Rotation  0-70: 0-40 deg at 45 deg ABD  R Shoulder Ext Rotation 0-90: 0-73 deg at 45 deg ABD         TREATMENT     Exercises:      Treatment Reasoning    Exercise 1: Wall slides for flexion and scaption x 5 H10  Exercise 2: Standing shoulder flexion x 3  Exercise 4: pulleys flexion and abduction x 10 H5'', attempted IR but too painful and thus deferred  Exercise 5: wall slides RUE flexion with eccentric abduction H5'' x 7  Exercise 6: capital D's x 10  Exercise 8: wall slides abduction with eccentric flexion; H5''x  2 (inc nerve pain in R hand and thus deferred)  Exercise 9: mid rows; H3'' x 6 YTB (dec reps d/t light headedness) /84 mmHg. Manual Therapy: (CPT 90298) Treatment Reasoning     Joint Mobilization: PROM to RUE flexion, abduction and ER. limited by pain and needed gentle oscillations to promote relaxation. R scap mobs with focus on upward rotation to inc ROM                      Pt Education: Additional Comments: *Pt dx with Lymphoma in Aug 2022 and is currently undergoing chemo and radiation* NO ESTIM OR US; Okay KT tape, CP, HP       ASSESSMENT     Assessment: Assessment: Pt feeling extreme fatigue and some light headedness during tx. BP taken and was WNL's. Performed PROM with pt progressing slowly but limited by pain. Gentle oscillations throughout PROM as well as VC's for scap setting to dec pain. Pt experiencing pain at end ranges and with eccentric lowering but eased with pressing into therapist's hand upon eccentric lowering. Pain level 4/10 post. Pt given mid rows for HEP this week with YTB. Pt states she will ice at home. Body Structures, Functions, Activity Limitations Requiring Skilled Therapeutic Intervention: Decreased functional mobility , Decreased ROM, Decreased strength, Decreased endurance, Increased pain    Post-Treatment Pain Level: Activity Tolerance: Patient limited by endurance;  Patient limited by pain    Therapy Prognosis: Good       GOALS   Patient Goals : Be able to use her right arm overhead  Short Term Goals Completed by 1-2 weeks from date of evaluation Current Status Goal Status   Pt reports having 3/10 or less right shoulder pain with ADLS   New   Pt reports able to complete her HEP 3-5x per week   New                                                                       Long Term Goals Completed by 4-6 weeks from date of evaluation on 10/10/22 Current Status Goal Status   Pt reports having 2/10 or less R UE pain while performing ADLS and work duties   New   Increase AROM of right shoulder to ojsszyz=348 degrees, abduction =140 degrees and IR= able to reach to her toleraance for improved function   New   Increase RUE strength to 4+/5 or >so that pt can perform overhead activities without increased pain   New   Improve her Spadi (Shoulder Pain & Disability Index) from 34% disability on eval to <10% by time of DC   New   Pt indep with an advanced HEP   New                                                TREATMENT PLAN   Plan Frequency: 1-2x per week  Plan weeks: 4-6 weeks  Current Treatment Recommendations: Strengthening, ROM, Functional mobility training, Manual Therapy - Soft Tissue Mobilization, Pain management, Home exercise program, Safety education & training, Modalities   Additional Comments: *Pt dx with Lymphoma in Aug 2022 and is currently undergoing chemo and radiation* NO ESTIM OR US; Anna VERA tape, CP, HP       Therapy Time  Individual Time In:       8695  Individual Time Out:  1100  Minutes:  45        Electronically signed by Livier Whitehead PTA  on 10/18/2022 at 11:06 AM   POC NOTE

## 2022-10-25 ENCOUNTER — HOSPITAL ENCOUNTER (OUTPATIENT)
Dept: PHYSICAL THERAPY | Age: 47
Setting detail: THERAPIES SERIES
Discharge: HOME | End: 2022-10-25
Payer: COMMERCIAL

## 2022-10-25 PROCEDURE — 97140 MANUAL THERAPY 1/> REGIONS: CPT

## 2022-10-25 PROCEDURE — 97110 THERAPEUTIC EXERCISES: CPT

## 2022-10-25 ASSESSMENT — PAIN DESCRIPTION - FREQUENCY: FREQUENCY: CONTINUOUS

## 2022-10-25 ASSESSMENT — PAIN DESCRIPTION - ORIENTATION: ORIENTATION: RIGHT

## 2022-10-25 ASSESSMENT — PAIN DESCRIPTION - DIRECTION: RADIATING_TOWARDS: R SHOULDER

## 2022-10-25 ASSESSMENT — PAIN DESCRIPTION - LOCATION: LOCATION: SHOULDER

## 2022-10-25 ASSESSMENT — PAIN SCALES - GENERAL: PAINLEVEL_OUTOF10: 3

## 2022-10-25 ASSESSMENT — PAIN DESCRIPTION - DESCRIPTORS: DESCRIPTORS: ACHING;BURNING

## 2022-10-25 NOTE — PROGRESS NOTES
Physical Therapy: Daily Note   Patient: Vanesa Vazquez (7 y.o. female)   Examination Date:   Plan of Care/Certification Expiration Date: 11/10/22    No data recorded   :  1975 # of Visits since [facility]:   3   MRN: [id number]  CSN: [id number] Start of Care Date:   10/10/2022   Insurance: Payor: [employer] / Plan: [employer] LANG - EXCHANGE / Product Type: *No Product type* /   Insurance ID: [id number] - (Commercial) Secondary Insurance (if applicable):    Referring Physician: MD Dr Lord Suki Paz   PCP: Una Jasso MD Visits to Date/Visits Approved:     No Show/Cancelled Appts: 0  0     Medical Diagnosis: Pathological fracture, unspecified humerus, initial encounter for fracture [V94.186Z] s/p ORIF Right humerus due to pathological fx (recently dx with lymphoma)  Treatment Diagnosis: s/p ORIF right humerus due to 2 pathological fxs (dx with lymphoma)        SUBJECTIVE EXAMINATION   Pain Level: Pain Screening  Pain Level: 3 (3-4)  Pain Location: Shoulder  Pain Orientation: Right  Pain Radiating Towards: R shoulder  Pain Descriptors: Aching, Burning  Pain Frequency: Continuous    Patient Comments: Subjective: Pt reports feeling a burning aching sensation in shoulder, fatigued after last chemo session a week ago and going through [employer] of the radiation\" radiation ended last Monday.     HEP Compliance: Good        OBJECTIVE EXAMINATION     TREATMENT     Exercises:      Treatment Reasoning    Exercise 1: wall slides scaption- deferred per pts request due to increase \"nerve pain \" with attempted wall slides abd last session and having increased \"burning pain\"  Exercise 4: pulleys flexion and abduction x 10 H5''  Exercise 5: wall slides RUE flexion with eccentric abduction (arm supported on wall during eccentric abd due to extreme fatigue and mild increased pain)  H5'' x 10  Exercise 6: capital D's x 5-10  Exercise 9: mid rows YTB x 10 H 2-3 sec  Exercise 10: bilat shoulder ext x 10 YTB H 1-2  Exercise 11: ER 2x5 H1-2 YTB  Exercise 12: IR 2x5 H 1-2 YTB                          Manual Therapy: (CPT 58077) Treatment Reasoning     Joint Mobilization: PROM  R shoulder flexion, abduction and ER. limited by pain and needed gentle oscillations to promote relaxation. R scap mobs with focus on med and inf glides to inc ROM                    CP R shoulder post session for pain reduction. Additional Comments: *Pt dx with Lymphoma in Aug 2022 and is currently undergoing chemo and radiation* NO ESTIM OR US; Okay KT tape, CP, HP       ASSESSMENT     Assessment: Assessment: Continued complaints of overall fatigue and \"burning sensation' L shoulder after just completing radiation last week and also chemotherapy session last week. Tolerated ROM and light resistance strengthening exs fairly well with frequent short breaks due to fatigue. Manual therapy performed- gentle P/AAROM/stretching following scapular mobs with vcs for relaxation as much as possible due to intermittent guarding during ROM. Complaints of intermittent \"tingling' in hand usually at endrange flex and abd, resolved fairly quickly after returning to neutral position. Encouraged resuming HEP more frequently as able focusing on gentle stretching and CP PRN for pain reduction. Body Structures, Functions, Activity Limitations Requiring Skilled Therapeutic Intervention: Decreased functional mobility , Decreased ROM, Decreased strength, Decreased endurance, Increased pain    Post-Treatment Pain Level: Activity Tolerance: Patient limited by endurance;  Patient limited by pain    Therapy Prognosis: Good       GOALS   Patient Goals : Be able to use her right arm overhead  Short Term Goals Completed by 1-2 weeks from date of evaluation Current Status Goal Status   Pt reports having 3/10 or less right shoulder pain with ADLS Pt reports 4/60 up to 6-8/10 at times     Pt reports able to complete her HEP 3-5x per week Pt 5 out of 7

## 2022-11-01 ENCOUNTER — HOSPITAL ENCOUNTER (OUTPATIENT)
Dept: PHYSICAL THERAPY | Age: 47
Setting detail: THERAPIES SERIES
Discharge: HOME | End: 2022-11-01
Payer: COMMERCIAL

## 2022-11-01 PROCEDURE — 97140 MANUAL THERAPY 1/> REGIONS: CPT

## 2022-11-01 PROCEDURE — 97110 THERAPEUTIC EXERCISES: CPT

## 2022-11-01 ASSESSMENT — PAIN DESCRIPTION - ORIENTATION: ORIENTATION: RIGHT

## 2022-11-01 ASSESSMENT — PAIN DESCRIPTION - LOCATION: LOCATION: SHOULDER

## 2022-11-01 ASSESSMENT — PAIN SCALES - GENERAL: PAINLEVEL_OUTOF10: 2

## 2022-11-01 ASSESSMENT — PAIN DESCRIPTION - FREQUENCY: FREQUENCY: CONTINUOUS

## 2022-11-01 ASSESSMENT — PAIN DESCRIPTION - DESCRIPTORS: DESCRIPTORS: ACHING

## 2022-11-01 ASSESSMENT — PAIN DESCRIPTION - PAIN TYPE: TYPE: SURGICAL PAIN

## 2022-11-01 NOTE — PROGRESS NOTES
Physical Therapy  Daily Treatment Note  Date: 2022  Patient Name: Meaghan Acevedo  MRN: [id number]     :   1975    Referring Physician: Alissa Garza MD     PCP: Darci Colvin MD    Medical Diagnosis: Pathological fracture, unspecified humerus, initial encounter for fracture [I34.528F]    Treatment Diagnosis: s/p ORIF right humerus due to 2 pathological fxs (dx with lymphoma)      Insurance: Payor: [employer] / Plan: [employer] LANG - EXCHANGE / Product Type: *No Product type* /   Insurance ID: [id number] - (Commercial)    Subjective:   PT Visit Information  Onset Date: 08/15/22  PT Insurance Information: 30 visits remaining (40 per year)  Total # of Visits Approved: 12  Total # of Visits to Date: 5  Plan of Care/Certification Expiration Date: 11/10/22  No Show: 0  Subjective  Subjective: Pt. states she was able to tolerate doing some of the band exercises throughout the week. Mild soreness in R proximal shd. Some inc nerve pain with wall slides abd thus has been doing table slides at home with foam roller. Pain Screening  Patient Currently in Pain: Yes  Pain Assessment: 0-10  Pain Level: 2  Pain Type: Surgical pain  Pain Location: Shoulder  Pain Orientation: Right  Pain Radiating Towards: R proximal shoulder  Pain Descriptors: Aching  Pain Frequency: Continuous       Treatment Activities:  Exercises:      Treatment Reasoning    Exercise 1: wall slides scaption- deferred per pts request due to increase \"nerve pain \" with attempted wall slides abd last session and having increased \"burning pain\"  Exercise 4: pulleys flexion and abduction x 10 H5''  Exercise 7: table slides flexion and abduction  H5'' x 10  Exercise 9: mid rows YTB x 10 H 2-3 sec  Exercise 10: bilat shoulder ext x 10 YTB H 1-2  Exercise 11: ER x 10 hold 3 sec YTB  Exercise 12: IR x10 H 3 YTB                          Manual Therapy: (CPT 71323) Treatment Reasoning     Joint Mobilization: PROM to RUE flexion, abduction and ER. limited by pain and needed gentle oscillations to promote relaxation. R scap mobs with focus on upward rotation to inc ROM  Soft Tissue Mobilizaton: MFR over R demtoid tuberosity to dec pain and tightness                       Assessment:   Conditions Requiring Skilled Therapeutic Intervention  Body Structures, Functions, Activity Limitations Requiring Skilled Therapeutic Intervention: Decreased functional mobility ; Decreased ROM; Decreased strength;Decreased endurance; Increased pain  Assessment: Tolerates therex with mild fatigue and slight burning snesation in R delt. Pt. needing occ RB's throguhut often shaking arm out or performing scap retractions in between exercises. Pt. able to tolerate inc reps with light resistance exercises with no inc in pain. Pain and guarding with manual threapy needing distraction and oscillations throughout to dec guarding. Pain post 4/10. CP provided to dec pain. Treatment Diagnosis: s/p ORIF right humerus due to 2 pathological fxs (dx with lymphoma)  Therapy Prognosis: Good  Activity Tolerance  Activity Tolerance: Patient limited by endurance; Patient limited by pain          Goals:  Short Term Goals  Time Frame for Short Term Goals: 1-2 weeks from date of evaluation  Short Term Goal 1: Pt reports having 3/10 or less right shoulder pain with ADLS  STG 1 Current Status[de-identified] Pt reports 4/10 up to 6-8/10 at times  Short Term Goal 2: Pt reports able to complete her HEP 3-5x per week  STG 2 Current Status[de-identified] Pt 5 out of 7 days of week  Long Term Goals  Time Frame for Long Term Goals : 4-6 weeks from date of evaluation on 10/10/22  Long Term Goal 1: Pt reports having 2/10 or less R UE pain while performing ADLS and work duties  Long Term Goal 2: Increase AROM of right shoulder to yiriuuh=306 degrees, abduction =140 degrees and IR= able to reach to her toleraance for improved function  Long Term Goal 3:  Increase RUE strength to 4+/5 or >so that pt can perform overhead activities without increased pain  Long Term Goal 4: Improve her Spadi (Shoulder Pain & Disability Index) from 34% disability on eval to <10% by time of DC  Long Term Goal 5: Pt indep with an advanced HEP  Patient Goals   Patient Goals : Be able to use her right arm overhead    Plan:    Physcial Therapy Plan  Plan weeks: 4-6 weeks  Current Treatment Recommendations: Strengthening, ROM, Functional mobility training, Manual Therapy - Soft Tissue Mobilization, Pain management, Home exercise program, Safety education & training, Modalities  Additional Comments: *Pt dx with Lymphoma in Aug 2022 and is currently undergoing chemo and radiation* NO ESTIM OR US; Anna KT tape, CP, HP        Therapy Time:   Individual Concurrent Group Co-treatment   Time In  1100         Time Out  1150         Minutes  48                 Jake Loop, PTA

## 2022-11-03 ENCOUNTER — HOSPITAL ENCOUNTER (OUTPATIENT)
Dept: PHYSICAL THERAPY | Age: 47
Setting detail: THERAPIES SERIES
Discharge: HOME | End: 2022-11-03
Payer: COMMERCIAL

## 2022-11-03 PROCEDURE — 97140 MANUAL THERAPY 1/> REGIONS: CPT

## 2022-11-03 PROCEDURE — 97110 THERAPEUTIC EXERCISES: CPT

## 2022-11-03 ASSESSMENT — PAIN DESCRIPTION - FREQUENCY: FREQUENCY: CONTINUOUS

## 2022-11-03 ASSESSMENT — PAIN SCALES - GENERAL: PAINLEVEL_OUTOF10: 3

## 2022-11-03 ASSESSMENT — PAIN DESCRIPTION - ORIENTATION: ORIENTATION: RIGHT

## 2022-11-03 ASSESSMENT — PAIN DESCRIPTION - DESCRIPTORS: DESCRIPTORS: ACHING;TIGHTNESS;TENDER

## 2022-11-03 ASSESSMENT — PAIN DESCRIPTION - LOCATION: LOCATION: SHOULDER

## 2022-11-03 NOTE — PROGRESS NOTES
Physical Therapy: Daily Note   Patient: Roe Artis (89 y.o. female)   Examination Date:   Plan of Care/Certification Expiration Date: 11/10/22    No data recorded   :  1975 # of Visits since [facility]:   6   MRN: [id number]  CSN: [id number] Start of Care Date:   6/15/2022   Insurance: Payor: [employer] / Plan: [employer] LANG - EXCHANGE / Product Type: *No Product type* /   Insurance ID: [id number] - (Commercial) Secondary Insurance (if applicable):    Referring Physician: MD Dr Jae King   PCP: Nataly Nielsen MD Visits to Date/Visits Approved:     No Show/Cancelled Appts: 0      Medical Diagnosis: Pathological fracture, unspecified humerus, initial encounter for fracture [U87.477J] s/p ORIF Right humerus due to pathological fx (recently dx with lymphoma)  Treatment Diagnosis: s/p ORIF right humerus due to 2 pathological fxs (dx with lymphoma)        SUBJECTIVE EXAMINATION   Pain Level: Pain Screening  Pain Level: 3  Pain Location: Shoulder  Pain Orientation: Right  Pain Radiating Towards: R ant shoulder, prox upper arm  Pain Descriptors: Aching, Tightness, Tender  Pain Frequency: Continuous    Patient Comments: Subjective: Pt reports having a \"good day today\", next chemo session tomorrw-4th session, will have 2 sessions left after tomorrw, every 21 days having chemo session; MRI 2 days ago, results of MRI- CA appears resolved, edema and fluid around joint per pt.  Still having numbness in fingertips    HEP Compliance: Good        OBJECTIVE EXAMINATION   Restrictions:  No data recorded No data recorded No data recorded       Right AROM        AROM RUE (degrees)  R Shoulder Flexion 0-180: 130 deg supine  R Shoulder ABduction 0-180: 0-110 deg   R Shoulder Ext Rotation 0-90: 82 deg at 50 deg abd         TREATMENT     Exercises:      Treatment Reasoning    Exercise 1: wall slides scaption- x 4 deferred additional reps due to onset of nerve pain- increasing with increased resp  Exercise 4: pulleys flexion and abduction x 10 H5''  Exercise 5: wall slides RUE flexion with eccentric abduction (arm supported on wall during eccentric abd due to extreme fatigue and mild increased pain)  H5'' x 10  Exercise 7: table slides abduction  H5'' x 10  Exercise 9: mid rows YTB x 10 H 2-3 sec  Exercise 10: bilat shoulder ext x 10 YTB H 1-2  Exercise 11: ER x 10 hold 3 sec YTB  Exercise 12: IR x10 H 3 YTB                          Manual Therapy: (CPT 66946) Treatment Reasoning     Joint Mobilization: PROM to RUE flexion, abduction and ER. limited by pain and needed gentle oscillations to promote relaxation. R scap mobs with focus on upward rotation to inc ROM  Soft Tissue Mobilizaton: MFR over R demtoid tuberosity to dec pain and tightness                      Pt Education: Additional Comments: *Pt dx with Lymphoma in Aug 2022 and is currently undergoing chemo and radiation* NO ESTIM OR US; Okay KT tape, CP, HP       ASSESSMENT     Assessment: Assessment: Less pain and guarding during ROM exs and during manual therapy, able to perform 4 reps of wall slides scaption prior to increasing nerve pain (pain tingling numbness) Still experiencing and demonstrating mod fatigue post 10 reps yellow TBand resistive exs thus did not attmept increased reps or increased resistance. AROM steadily improving (flex abd and ER ROM all improved) Pain post session 3-4/10. Body Structures, Functions, Activity Limitations Requiring Skilled Therapeutic Intervention: Decreased functional mobility , Decreased ROM, Decreased strength, Decreased endurance, Increased pain    Post-Treatment Pain Level:  3-4/10    Activity Tolerance: Patient limited by endurance;  Patient limited by pain    Therapy Prognosis: Good       GOALS   Patient Goals : Be able to use her right arm overhead  Short Term Goals Completed by 1-2 weeks from date of evaluation Current Status Goal Status   Pt reports having 3/10 or less right shoulder pain with ADLS       Pt reports able to complete her HEP 3-5x per week                                                                           Long Term Goals Completed by 4-6 weeks from date of evaluation on 10/10/22 Current Status Goal Status   Pt reports having 2/10 or less R UE pain while performing ADLS and work duties       Increase AROM of right shoulder to nhmrtad=835 degrees, abduction =140 degrees and IR= able to reach to her toleraance for improved function       Increase RUE strength to 4+/5 or >so that pt can perform overhead activities without increased pain       Improve her Spadi (Shoulder Pain & Disability Index) from 34% disability on eval to <10% by time of DC       Pt indep with an advanced HEP                                                    TREATMENT PLAN   Plan Frequency: 1-2x per week  Plan weeks: 4-6 weeks  Current Treatment Recommendations: Strengthening, ROM, Functional mobility training, Manual Therapy - Soft Tissue Mobilization, Pain management, Home exercise program, Safety education & training, Modalities   Additional Comments: *Pt dx with Lymphoma in Aug 2022 and is currently undergoing chemo and radiation* NO ESTIM OR US; nAna KT tape, CP, HP       Therapy Time  Individual Time In:       1030  Individual Time Out:  1120  Minutes:  48        Electronically signed by Waleska Sidhu PT  on 11/3/2022 at 5:15 PM   POC NOTE

## 2022-11-08 ENCOUNTER — HOSPITAL ENCOUNTER (OUTPATIENT)
Dept: PHYSICAL THERAPY | Age: 47
Setting detail: THERAPIES SERIES
Discharge: HOME | End: 2022-11-08
Payer: COMMERCIAL

## 2022-11-08 PROCEDURE — 97140 MANUAL THERAPY 1/> REGIONS: CPT

## 2022-11-08 PROCEDURE — 97110 THERAPEUTIC EXERCISES: CPT

## 2022-11-08 ASSESSMENT — PAIN SCALES - GENERAL: PAINLEVEL_OUTOF10: 3

## 2022-11-08 ASSESSMENT — PAIN DESCRIPTION - LOCATION: LOCATION: SHOULDER

## 2022-11-08 ASSESSMENT — PAIN DESCRIPTION - DESCRIPTORS: DESCRIPTORS: ACHING;SHOOTING;TIGHTNESS

## 2022-11-08 ASSESSMENT — PAIN DESCRIPTION - FREQUENCY: FREQUENCY: CONTINUOUS

## 2022-11-08 ASSESSMENT — PAIN DESCRIPTION - ORIENTATION: ORIENTATION: RIGHT

## 2022-11-08 ASSESSMENT — PAIN DESCRIPTION - PAIN TYPE: TYPE: SURGICAL PAIN

## 2022-11-08 NOTE — PROGRESS NOTES
Physical Therapy: Daily Note   Patient: Yvette Roblero (9 y.o. female)   Examination Date:   Plan of Care/Certification Expiration Date: 11/10/22    No data recorded   :  1975 # of Visits since [facility]:   5   MRN: [id number]  CSN: [id number] Start of Care Date:   10/10/2022   Insurance: Payor: [employer] / Plan: [employer] LANG - EXCHANGE / Product Type: *No Product type* /   Insurance ID: [id number] - (Commercial) Secondary Insurance (if applicable):    Referring Physician: Jonathan Crowder MD     PCP: Rhett Alaniz MD Visits to Date/Visits Approved:     No Show/Cancelled Appts: 0 / 0     Medical Diagnosis: Pathological fracture, unspecified humerus, initial encounter for fracture [X98.634I]    Treatment Diagnosis: s/p ORIF right humerus due to 2 pathological fxs (dx with lymphoma)        SUBJECTIVE EXAMINATION   Pain Level: Pain Screening  Patient Currently in Pain: Yes  Pain Assessment: 0-10  Pain Level: 3  Patient's Stated Pain Goal: 0 - No pain  Pain Type: Surgical pain  Pain Location: Shoulder  Pain Orientation: Right  Pain Descriptors: Aching, Shooting, Tightness  Pain Frequency: Continuous    Patient Comments: Subjective: Pt reports feeling more nauseated this am as meds following chemo to help with nausea are wearing off. [place] 3-4/10, using lots of ice which helps the pain as well. Tries to ice first thing in am due to achiness after sleeping.     HEP Compliance: Good        OBJECTIVE EXAMINATION   Restrictions:  No data recorded No data recorded No data recorded    TREATMENT     Exercises:  Therapeutic exercise (CPT 07036)   Treatment Reasoning    Exercise 1: shoulder internal rotation stretch with pillow case 30 sec x 4  Exercise 2: RUE pendulums x 30 sec to promote relaxation  Exercise 5: wall slides RUE flexion with eccentric abduction (arm supported on wall during eccentric abd due to extreme fatigue and mild increased pain)  H5'' x 10  Exercise 6: Median nerve glides x 10 (extend shoulder/elbow/wrist whil looking away, then flexing and looking down toward elbow); Median nerve flossing (extend and look toward, flex look away) x 5; issued handout to add to home  Exercise 7: table slides abduction and flexion,  H5'' x 10 ea  Exercise 9: mid rows YTB x 10 H 2-3 sec  Exercise 10: bilat shoulder ext x 10 YTB H 1-2  Exercise 11: ER x 10 hold 3 sec YTB  Exercise 12: IR x10 H 3 YTB    Limitations addressed: Mobility, Strength, Flexibility, Activity tolerance, Posture, Pain modulation  Therapist provided: Verbal cuing, Tactile cuing  Progressed: Repetitions, Duration  Functional ability(s) targeted: Reaching overhead, Performing self care actvities, Tolerance to age appropriate activities                     Manual Therapy: (CPT 51181) Treatment Reasoning     Other: PROM to R shoulder flex and abd; limited by pain at [facility] jt posteriorly; sidelying scpaular mobs- distraction, inf/sup glides, rotation of inf angle Limitations addressed: Tissue extensibility, Joint motion  Therapist provided: Tactile cuing  Functional ability(s) targeted: Reaching overhead, Performing self care actvities, Tolerance to age appropriate activities                    Pt Education: PT Education: Home Exercise Program  Patient Education: Issued handout for nerve glides and flossing  Additional Comments: *Pt dx with Lymphoma in Aug 2022 and is currently undergoing chemo and radiation* NO ESTIM OR US; Okay KT tape, CP, HP       ASSESSMENT     Assessment: Assessment: Pt with very good compliance with program, getting good pain relief with stretches and icing at home. More fatigued and nasueated this date due to recent round of chemo, but overall able to complete program and tolerate with rest breaks. remains highly motiated. Added nerve stretching/glides to help with pain and ROM tolerance, added to HEP. Continues with some guarding at endrange, but overall toelrated well.  Continues to benefit from skilled PT to improve shoulder ROM, pain, strength and function. Body Structures, Functions, Activity Limitations Requiring Skilled Therapeutic Intervention: Decreased functional mobility , Decreased ROM, Decreased strength, Decreased endurance, Increased pain    Post-Treatment Pain Level: Activity Tolerance: Patient limited by endurance;  Patient limited by pain    Therapy Prognosis: Good       GOALS   Patient Goals : Be able to use her right arm overhead  Short Term Goals Completed by 1-2 weeks from date of evaluation Current Status Goal Status   Pt reports having 3/10 or less right shoulder pain with ADLS Pt reports 1/57 up to 6-8/10 at times In progress   Pt reports able to complete her HEP 3-5x per week Pt 5 out of 7 days of week Met                                                                       Long Term Goals Completed by 4-6 weeks from date of evaluation on 10/10/22 Current Status Goal Status   Pt reports having 2/10 or less R UE pain while performing ADLS and work duties Pt mostly at a 4/10 and up to 6-8/10 at times Not Met   Increase AROM of right shoulder to cvydhqt=295 degrees, abduction =140 degrees and IR= able to reach to her toleraance for improved function   In progress   Increase RUE strength to 4+/5 or >so that pt can perform overhead activities without increased pain Progressing with strengthening In progress   Improve her Spadi (Shoulder Pain & Disability Index) from 34% disability on eval to <10% by time of DC   In progress   Pt indep with an advanced HEP Pt continues to progress with her HEP In progress                                                TREATMENT PLAN   Plan Frequency: 1-2x per week  Plan weeks: 4-6 weeks  Current Treatment Recommendations: Strengthening, ROM, Functional mobility training, Manual Therapy - Soft Tissue Mobilization, Pain management, Home exercise program, Safety education & training, Modalities   Additional Comments: *Pt dx with Lymphoma in Aug 2022 and is currently undergoing chemo and radiation* NO ESTIM OR US; Okay KT tape, CP, HP       Therapy Time  Individual Time In: 1120       Individual Time Out: 1200  Minutes: 40        Electronically signed by Cristofer Hill PT  on 11/8/2022 at 1:05 PM   POC NOTE

## 2022-11-10 ENCOUNTER — HOSPITAL ENCOUNTER (OUTPATIENT)
Dept: PHYSICAL THERAPY | Age: 47
Setting detail: THERAPIES SERIES
Discharge: HOME | End: 2022-11-10
Payer: COMMERCIAL

## 2022-11-10 PROCEDURE — 97140 MANUAL THERAPY 1/> REGIONS: CPT

## 2022-11-10 PROCEDURE — 97110 THERAPEUTIC EXERCISES: CPT

## 2022-11-10 ASSESSMENT — PAIN DESCRIPTION - ORIENTATION: ORIENTATION: RIGHT

## 2022-11-10 ASSESSMENT — PAIN DESCRIPTION - FREQUENCY: FREQUENCY: CONTINUOUS

## 2022-11-10 ASSESSMENT — PAIN DESCRIPTION - LOCATION: LOCATION: SHOULDER

## 2022-11-10 ASSESSMENT — PAIN DESCRIPTION - DESCRIPTORS: DESCRIPTORS: ACHING

## 2022-11-10 ASSESSMENT — PAIN DESCRIPTION - PAIN TYPE: TYPE: SURGICAL PAIN

## 2022-11-10 NOTE — PROGRESS NOTES
Daily Treatment Note  Date: 11/10/2022  Patient Name: Angelic Mauricio  MRN: [id number]     :   1975    Referring Physician: MD Dr Larry Sy   PCP: Fallon Hubbard MD    Medical Diagnosis: Pathological fracture, unspecified humerus, initial encounter for fracture [I75.534G]    Treatment Diagnosis: s/p ORIF right humerus due to 2 pathological fxs (dx with lymphoma)      Insurance: Payor: [employer] / Plan: [employer] LANG - EXCHANGE / Product Type: *No Product type* /   Insurance ID: [id number] - (Commercial)    Subjective:   General  Referring Provider (secondary): Dr Larry Allen  PT Visit Information  Onset Date: 08/15/22  PT Insurance Information: 30 visits remaining (40 per year)  Total # of Visits Approved: 12  Total # of Visits to Date: 8  Plan of Care/Certification Expiration Date: 11/10/22  No Show: 0  Canceled Appointment: 0  Subjective  Subjective: Pt. states she was able to work in yard yesterday. Pt. states she did more than she thought she could with raking leaves. Pt. states nausea alittle less but still had to take antinausea medication. Pain currently 3-4/10. Pain Screening  Patient Currently in Pain: Yes  Pain Assessment: 0-10  Pain Level:  (3-4/10)  Pain Type: Surgical pain  Pain Location: Shoulder  Pain Orientation: Right  Pain Radiating Towards: R shd  Pain Descriptors: Aching  Pain Frequency: Continuous       Treatment Activities:  Exercises:      Treatment Reasoning    Exercise 5: wall slides RUE flexion with eccentric abduction (better tolerance this date less support of needing elbow at wall)   H5'' x 10  Exercise 7: table slides abduction and flexion,  H5'' x 10 ea  Exercise 9: mid rows YTB x 10 H3 sec  Exercise 10: bilat shoulder ext x 10 YTB H 3  Exercise 11: ER x 10 hold 3 sec YTB  Exercise 12: IR x10 H 3 YTB   Exercise 12:  Median Nerve glide x 5                       Manual Therapy: (CPT 98188) Treatment Reasoning     Other: PROM to R shoulder flex and abd; [address] mobs INF and post to inc mobility; sidelying scpaular mobs- distraction, inf/sup glides, rotation of inf angle                       Assessment:   Conditions Requiring Skilled Therapeutic Intervention  Body Structures, Functions, Activity Limitations Requiring Skilled Therapeutic Intervention: Decreased functional mobility ; Decreased ROM; Decreased strength;Decreased endurance; Increased pain  Assessment: Pt. still limited by fatigue this date following round of chemo. Pt. needing occ RB's but demoing good compliance with HEP. Mostly limited by fatigue however pt. did work in yard quite a bit yesterday. Pt. has been managing with ice. Continued with PROM of L shd and scap mobs with inc tightness noted at inferior angle this date. But good releif with mobilization. Pt. did not rate pain post.  Mostly tightness per pt. Pt. to ice at home. Treatment Diagnosis: s/p ORIF right humerus due to 2 pathological fxs (dx with lymphoma)  Therapy Prognosis: Good  Activity Tolerance  Activity Tolerance: Patient limited by endurance; Patient limited by pain  Activity Tolerance Comments: increased fatigue this date due to recent round of chemo, but able to tolerate with rest breaks          Goals:  Short Term Goals  Time Frame for Short Term Goals: 1-2 weeks from date of evaluation  Short Term Goal 1: Pt reports having 3/10 or less right shoulder pain with ADLS  STG 1 Current Status[de-identified] Pt reports 4/10 up to 6-8/10 at times  STG Goal 1 Status[de-identified] In progress  Short Term Goal 2: Pt reports able to complete her HEP 3-5x per week  STG 2 Current Status[de-identified] Pt 5 out of 7 days of week  STG Goal 2 Status[de-identified] Met  Long Term Goals  Time Frame for Long Term Goals : 4-6 weeks from date of evaluation on 10/10/22  Long Term Goal 1: Pt reports having 2/10 or less R UE pain while performing ADLS and work duties  LTG 1 Current Status[de-identified] Pt mostly at a 4/10 and up to 6-8/10 at times  LTG Goal 1 Status[de-identified] Not Met  Long Term Goal 2: Increase AROM of right shoulder to maibgyl=348 degrees, abduction =140 degrees and IR= able to reach to her toleraance for improved function  LTG Goal 2 Status[de-identified] In progress  Long Term Goal 3:  Increase RUE strength to 4+/5 or >so that pt can perform overhead activities without increased pain  LTG 3 Current Status[de-identified] Progressing with strengthening  LTG Goal 3 Status[de-identified] In progress  Long Term Goal 4: Improve her Spadi (Shoulder Pain & Disability Index) from 34% disability on eval to <10% by time of DC  LTG Goal 4 Status[de-identified] In progress  Long Term Goal 5: Pt indep with an advanced HEP  LTG 5 Current Status[de-identified] Pt continues to progress with her HEP  LTG Goal 5 Status[de-identified] In progress  Patient Goals   Patient Goals : Be able to use her right arm overhead    Plan:    Physcial Therapy Plan  Plan weeks: 4-6 weeks  Current Treatment Recommendations: Strengthening, ROM, Functional mobility training, Manual Therapy - Soft Tissue Mobilization, Pain management, Home exercise program, Safety education & training, Modalities  Additional Comments: *Pt dx with Lymphoma in Aug 2022 and is currently undergoing chemo and radiation* NO ESTIM OR US; Anna KT tape, CP, HP        Therapy Time:   Individual Concurrent Group Co-treatment   Time In  1100         Time Out  1145         Minutes  39                 Luz Cochran [place]         Physical Therapy

## 2022-11-15 ENCOUNTER — HOSPITAL ENCOUNTER (OUTPATIENT)
Dept: PHYSICAL THERAPY | Age: 47
Setting detail: THERAPIES SERIES
Discharge: HOME | End: 2022-11-15
Payer: COMMERCIAL

## 2022-11-15 PROCEDURE — 97140 MANUAL THERAPY 1/> REGIONS: CPT

## 2022-11-15 PROCEDURE — 97110 THERAPEUTIC EXERCISES: CPT

## 2022-11-15 PROCEDURE — 97530 THERAPEUTIC ACTIVITIES: CPT

## 2022-11-15 ASSESSMENT — PAIN DESCRIPTION - LOCATION: LOCATION: SHOULDER

## 2022-11-15 ASSESSMENT — PAIN DESCRIPTION - ORIENTATION: ORIENTATION: RIGHT

## 2022-11-15 ASSESSMENT — PAIN SCALES - GENERAL: PAINLEVEL_OUTOF10: 2

## 2022-11-15 ASSESSMENT — PAIN DESCRIPTION - DESCRIPTORS: DESCRIPTORS: ACHING

## 2022-11-15 ASSESSMENT — PAIN DESCRIPTION - FREQUENCY: FREQUENCY: CONTINUOUS

## 2022-11-15 NOTE — PROGRESS NOTES
Physical Therapy: Monthly Recheck   Patient: Darci Dailey (18 y.o. female)   Examination Date:   Plan of Care/Certification Expiration Date: 12/15/22    No data recorded   :  1975 # of Visits since [facility]:   9   MRN: [id number]  CSN: [id number] Start of Care Date:   6/15/2022   Insurance: Payor: [employer] / Plan: [employer] LANG - EXCHANGE / Product Type: *No Product type* /   Insurance ID: [id number] - (Commercial) Secondary Insurance (if applicable):    Referring Physician: MD Dr Teddy Healy   PCP: Faustina Bautista MD Visits to Date/Visits Approved:   (-)    No Show/Cancelled Appts: 0 / 0     Medical Diagnosis: Pathological fracture, unspecified humerus, initial encounter for fracture [I05.673W] s/p ORIF Right humerus due to pathological fx (recently dx with lymphoma)  Treatment Diagnosis: s/p ORIF right humerus due to 2 pathological fxs (dx with lymphoma)        SUBJECTIVE EXAMINATION   Pain Level: Pain Screening  Patient Currently in Pain: Yes  Pain Assessment: 0-10  Pain Level: 2  Pain Location: Shoulder  Pain Orientation: Right  Pain Descriptors: Aching  Pain Frequency: Continuous    Patient Comments: Subjective: Pt reports primary complaint of fatigue, needs to rest more however energy levels increasing. Pt sleeping more on R side without pain disrupting sleep.  Still complaints of stiffness, and weakness, ave pain R shoulder over the last week 3-/10. 2/10 at rest.    HEP Compliance: Good        OBJECTIVE EXAMINATION   Restrictions:  No data recorded No data recorded No data recorded      Left AROM  Right AROM       WFL AROM RUE (degrees)  R Shoulder Flexion 0-180: 135 deg standing (tingling in R 4th and 5th digits at endrange), 147 deg supine  R Shoulder ABduction 0-180: 105 deg abd with mild scap hiking in standing and complaints of 4th 5th digit tingling at endrange, 100 deg supine  R Shoulder Int Rotation  0-70: Functional IR thumb to L1-2 with ERP  R Shoulder Ext Rotation 0-90: 83 deg at 50 deg abd       Left Strength  Right Strength          [facility]    Strength RUE  R Shoulder Flexion: 3+/5 (within available range)  R Shoulder Extension: 4-/5  R Shoulder ABduction: 3/5, 3+/5  R Shoulder Internal Rotation: 4-/5, 4/5  R Shoulder External Rotation: 3+/5, 4-/5  R Elbow Flexion: 4/5  R Elbow Extension: 4/5     TREATMENT     Exercises:      Treatment Reasoning    Exercise 5: wall slides RUE flexion with eccentric abduction (better tolerance this date less support of needing elbow at wall)   H5'' x 10  Exercise 9: mid rows RTB x 5 H3 sec  Exercise 10: bilat shoulder ext x 5 RTB H 3  Exercise 11: ER x 10 hold 3 sec YTB  Exercise 12: IR x10 H 3 YTB    Limitations addressed: Mobility, Strength, Flexibility, Activity tolerance, Posture, Pain modulation  Therapist provided: Verbal cuing, Tactile cuing                     Manual Therapy: (CPT 02367) Treatment Reasoning     Joint Mobilization: PROM  R shoulder flexion, abduction and ER. gentle oscillation mobs GH gr I-II for pain reduction and  to promote relaxation                      Pt Education: PT Education: Home Exercise Program  Patient Education: Reviewed HEP and importance  of compliance with HEP, along with increased use of R UE  Additional Comments: *Pt dx with Lymphoma in Aug 2022 and is currently undergoing chemo and radiation* NO ESTIM OR US; Anna KT tape, CP, HP       ASSESSMENT     Assessment: Assessment: 56year old female S/P ORIF R humerus fracture (lymphoma Dx) presents for monthly recheck. Pt has nearly achieved STGs and making steady progress toward achieval of LTGs. ROM and strength slowly improving. Pt would benefit from continued skilled physical therapy treatment 1-2x per wk x 4 wks to further improve ROM, strength and overall functional mobility.   Body Structures, Functions, Activity Limitations Requiring Skilled Therapeutic Intervention: Decreased functional mobility , Decreased ROM, Decreased strength, Decreased endurance, Increased pain    Post-Treatment Pain Level:  2    Activity Tolerance: Patient limited by endurance;  Patient limited by pain    Therapy Prognosis: Good       GOALS   Patient Goals : Be able to use her right arm overhead  Short Term Goals Completed by 1-2 weeks from date of evaluation Current Status Goal Status   Pt reports having 3/10 or less right shoulder pain with ADLS GOAL nearly met Pt reports 3-4/10 pain during ADLs up to 710 during P/AAROM occasionally In progress   Pt reports able to complete her HEP 3-5x per week pt at least doing HEP 4-5/week Met                                                                       Long Term Goals Completed by 4-6 weeks from date of recheck on 11/15/2022 Current Status Goal Status   Pt reports having 2/10 or less R UE pain while performing ADLS and work duties Pt reports 3-4/10 pain on ave during ADLs In progress   Increase AROM of right shoulder to fsfwcbc=202 degrees, abduction =140 degrees and IR= able to reach to her toleraance for improved function GOAL not yet met however ROM improving Recheck on 11/15/2022: Shoulder flexion= 130 Right shoudler abduction=   100 deg   IR= Functional IR thumb to L1-2  In progress   Increase RUE strength to 4+/5 or >so that pt can perform overhead activities without increased pain GOAL not yet met however strength slowly improving In progress   Improve her Spadi (Shoulder Pain & Disability Index) from 34% disability on eval to <10% by time of DC Spadi at recheck= 20% imprioved from 34% at time of IE     Pt indep with an advanced HEP progressing HEP as tolerated  In progress                                                TREATMENT PLAN   Plan Frequency: 1-2x per week  Plan weeks: 4-6 weeks  Current Treatment Recommendations: Strengthening, ROM, Functional mobility training, Manual Therapy - Soft Tissue Mobilization, Pain management, Home exercise program, Safety education & training, Modalities   Requires PT Follow-Up: Yes  Additional Comments: *Pt dx with Lymphoma in Aug 2022 and is currently undergoing chemo and radiation* NO ESTIM OR US; Okay KT tape, CP, HP       Therapy Time  Individual Time In:     1120    Individual Time Out:  3700  Minutes:  45        Electronically signed by Ethan Moreira PT  on 11/15/2022 at 1:44 PM   POC NOTE

## 2022-11-17 ENCOUNTER — OFFICE VISIT (OUTPATIENT)
Dept: FAMILY MEDICINE CLINIC | Age: 47
End: 2022-11-17
Payer: COMMERCIAL

## 2022-11-17 ENCOUNTER — HOSPITAL ENCOUNTER (OUTPATIENT)
Age: 47
Setting detail: SPECIMEN
Discharge: HOME | End: 2022-11-17
Payer: COMMERCIAL

## 2022-11-17 ENCOUNTER — HOSPITAL ENCOUNTER (OUTPATIENT)
Dept: LAB | Age: 47
Discharge: HOME | End: 2022-11-17
Payer: COMMERCIAL

## 2022-11-17 VITALS
HEIGHT: 66 IN | HEART RATE: 100 BPM | TEMPERATURE: 98.4 F | OXYGEN SATURATION: 100 % | BODY MASS INDEX: 39.25 KG/M2 | SYSTOLIC BLOOD PRESSURE: 130 MMHG | WEIGHT: 244.2 LBS | DIASTOLIC BLOOD PRESSURE: 82 MMHG

## 2022-11-17 DIAGNOSIS — R05.9 COUGH, UNSPECIFIED TYPE: Primary | ICD-10-CM

## 2022-11-17 DIAGNOSIS — R05.9 COUGH, UNSPECIFIED TYPE: ICD-10-CM

## 2022-11-17 DIAGNOSIS — R73.01 IMPAIRED FASTING BLOOD SUGAR: ICD-10-CM

## 2022-11-17 DIAGNOSIS — Z86.16 HISTORY OF COVID-19: ICD-10-CM

## 2022-11-17 DIAGNOSIS — J06.9 VIRAL URI WITH COUGH: ICD-10-CM

## 2022-11-17 LAB
HBA1C MFR BLD: 7.4 % (ref 4.8–5.9)
INFLUENZA A ANTIBODY: NORMAL
INFLUENZA B ANTIBODY: NORMAL
Lab: NORMAL
PERFORMING INSTRUMENT: NORMAL
QC PASS/FAIL: NORMAL
SARS-COV-2 ANTIBODY, TOTAL: POSITIVE
SARS-COV-2, POC: NORMAL

## 2022-11-17 PROCEDURE — 36415 COLL VENOUS BLD VENIPUNCTURE: CPT

## 2022-11-17 PROCEDURE — 87426 SARSCOV CORONAVIRUS AG IA: CPT

## 2022-11-17 PROCEDURE — 86769 SARS-COV-2 COVID-19 ANTIBODY: CPT

## 2022-11-17 PROCEDURE — 87635 SARS-COV-2 COVID-19 AMP PRB: CPT

## 2022-11-17 PROCEDURE — 83036 HEMOGLOBIN GLYCOSYLATED A1C: CPT

## 2022-11-17 PROCEDURE — 99214 OFFICE O/P EST MOD 30 MIN: CPT

## 2022-11-17 PROCEDURE — 87804 INFLUENZA ASSAY W/OPTIC: CPT

## 2022-11-17 ASSESSMENT — ENCOUNTER SYMPTOMS
SINUS PRESSURE: 0
EYES NEGATIVE: 1
RHINORRHEA: 0
VOMITING: 0
ABDOMINAL PAIN: 0
NAUSEA: 0
SORE THROAT: 0
DIARRHEA: 0
SHORTNESS OF BREATH: 0
COUGH: 1
CHEST TIGHTNESS: 0
WHEEZING: 0

## 2022-11-17 NOTE — PATIENT INSTRUCTIONS
Use Tylenol to treat pain or fever, any fever greater than 101F should be treated. Afrin 12-hour nasal spray (oxymetazoline ) for symptom of sinus congestion      Tussin cough syrup (Dextromethorphan) if needed to treat symptom of irritating cough. Mucinex (guaifenesin)  is indicated if you have chest mucus that you are having difficulty coughing up. Mucinex will make mucus more watery, but may increase the amount of mucus    Expect up to a 7 day course of the illness. Symptoms usually begin to improve between days 3 and 5. It is important to REST and increase water intake. Watch for signs of worsening illness such as feeling light headed, reduced urine output, or shortness of breath when walking.   Return immediately if you experience these symptoms or fevers that cannot be controlled

## 2022-11-17 NOTE — PROGRESS NOTES
[facility]0 [address] Encounter        ASSESSMENT/PLAN     Chanda Malhotra is a 56 y.o. female who presents with:  Sinus congestion and headache beginning yesterday this morning she began having nonproductive cough. She denies fevers or shortness of breath she denies prior to activity with cough. Denies sore throat or ear pain. Currently being treated for B-cell lymphoma non-Hodgkin's. On examination patient has unlabored respirations speaking full sentences, no cyanosis. Ears are normal bilaterally pharynx pink moist no tonsillar enlargement. Neck is supple no masses. Lung sounds are clear throughout heart sounds normal regular. COVID test and flu tests are both negative, PCR COVID test sent out      1. Cough, unspecified type    2. Viral URI with cough      Educated patient on expectations for resolution of viral symptoms in 5 to 7 days. Advised on recommended treatments for sinus congestion and cough if needed. Recommend she stay home rest and increase fluid intake. PATIENT REFERRED TO:  Return if symptoms worsen or fail to improve. DISCHARGE MEDICATIONS:  New Prescriptions    No medications on file     Cannot display discharge medications since this is not an admission. Dorothey Romberg, OCTAVIO - CNP    CHIEF COMPLAINT       Chief Complaint   Patient presents with    Cough     Since yesterday pt states started feeling congestion and headaches this morning woke up with a cough. Pt states was exposed Saturday          SUBJECTIVE/REVIEW OF SYSTEMS     Review of Systems   Constitutional:  Positive for chills. Negative for fatigue and fever. HENT:  Positive for congestion. Negative for ear pain, hearing loss, rhinorrhea, sinus pressure and sore throat. Eyes: Negative. Respiratory:  Positive for cough. Negative for chest tightness, shortness of breath and wheezing. Cardiovascular:  Negative for chest pain and palpitations.    Gastrointestinal:  Negative for abdominal pain, diarrhea, nausea and vomiting. Endocrine: Negative. Musculoskeletal:  Negative for arthralgias and myalgias. Skin:  Negative for rash. Neurological:  Positive for headaches. Negative for dizziness, weakness and light-headedness. Hematological:  Negative for adenopathy. Psychiatric/Behavioral: Negative. OBJECTIVE/PHYSICAL EXAM     Physical Exam  Vitals reviewed. Constitutional:       Appearance: Normal appearance. She is not ill-appearing or toxic-appearing. HENT:      Head: Normocephalic. Right Ear: Tympanic membrane, ear canal and external ear normal. No middle ear effusion. There is no impacted cerumen. No mastoid tenderness. Tympanic membrane is not perforated, erythematous or bulging. Left Ear: Tympanic membrane, ear canal and external ear normal.  No middle ear effusion. There is no impacted cerumen. No mastoid tenderness. Tympanic membrane is not perforated, erythematous or bulging. Nose: No congestion or rhinorrhea. Mouth/Throat:      Mouth: Mucous membranes are moist.      Pharynx: Oropharynx is clear. No pharyngeal swelling, oropharyngeal exudate or posterior oropharyngeal erythema. Tonsils: No tonsillar exudate or tonsillar abscesses. 0 on the right. 0 on the left. Eyes:      General:         Right eye: No discharge. Left eye: No discharge. Cardiovascular:      Rate and Rhythm: Normal rate and regular rhythm. Pulses: Normal pulses. Heart sounds: Normal heart sounds. No murmur heard. No gallop. Pulmonary:      Effort: Pulmonary effort is normal. No respiratory distress. Breath sounds: Normal breath sounds. No stridor. No wheezing, rhonchi or rales. Chest:      Chest wall: No tenderness. Abdominal:      General: Abdomen is flat. Musculoskeletal:      Cervical back: No rigidity or tenderness. Lymphadenopathy:      Head:      Right side of head: No submandibular adenopathy.       Left side of head: No submandibular adenopathy. Cervical: No cervical adenopathy. Skin:     General: Skin is warm and dry. Capillary Refill: Capillary refill takes less than 2 seconds. Coloration: Skin is not pale. Neurological:      Mental Status: She is alert and oriented to person, place, and time. Mental status is at baseline. Psychiatric:         Mood and Affect: Mood normal.         Behavior: Behavior normal.       VITALS  BP: 130/82, Temp: 98.4 °F (36.9 °C), Temp Source: Temporal, Heart Rate: 100,  , SpO2: 100 %      PAST MEDICAL HISTORY         Diagnosis Date    HLD (hyperlipidemia)     Migraine with aura and without status migrainosus, not intractable      SURGICAL HISTORY     Patient  has a past surgical history that includes Hand surgery (Right); Tonsillectomy; and IR TUNNELED CVC PLACE WO SQ PORT/PUMP > 5 YEARS (9/13/2022). CURRENT MEDICATIONS       Previous Medications    ALLOPURINOL (ZYLOPRIM) 300 MG TABLET    Take 300 mg by mouth daily    BUSPIRONE (BUSPAR) 5 MG TABLET    Take 1 tablet by mouth 2 times daily    CYCLOBENZAPRINE (FLEXERIL) 10 MG TABLET    Take 10 mg by mouth 2 times daily as needed for Muscle spasms     DICYCLOMINE (BENTYL) 10 MG CAPSULE    Take 1 capsule by mouth 3 times daily as needed (abdominal pain)    ETONOGESTREL-ETHINYL ESTRADIOL (NUVARING) 0.12-0.015 MG/24HR VAGINAL RING    Vaginal: One ring, inserted vaginally and left in place continuously for 3 consecutive weeks, then removed for 1 week. A new ring is inserted 7 days after the last was removed    GABAPENTIN (NEURONTIN) 300 MG CAPSULE    Take 4 capsules by mouth 2 times daily for 30 days.     NA SULFATE-K SULFATE-MG SULF (SUPREP) 17.5-3.13-1.6 GM/177ML SOLN SOLUTION    As directed    ONDANSETRON (ZOFRAN ODT) 4 MG DISINTEGRATING TABLET    Take 1 tablet by mouth every 8 hours as needed for Nausea    ONDANSETRON (ZOFRAN ODT) 4 MG DISINTEGRATING TABLET    Take 1 tablet by mouth every 8 hours as needed for Nausea or Vomiting PANTOPRAZOLE (PROTONIX) 20 MG TABLET    Take 2 tablets by mouth daily    PEGFILGRASTIM (NEULASTA) 6 MG/0.6ML INJECTION    Inject 6 mg into the skin once    PREDNISONE (DELTASONE) 20 MG TABLET    Take 20 mg by mouth daily     ALLERGIES     Patient is is allergic to nickel, other, oxycodone-acetaminophen, neosporin  [bacitracin-polymyxin b], vitamin b12, azithromycin, and vitamin b complex  [b complex-b12]. FAMILY HISTORY     Patient'sfamily history includes Diabetes in her father; Hypertension in her father; No Known Problems in her mother. HISTORY     Patient  reports that she has quit smoking. Her smokeless tobacco use includes chew. She reports current alcohol use. She reports that she does not use drugs. READY CARE COURSE     Orders Placed This Encounter   Procedures    COVID-19     Standing Status:   Future     Standing Expiration Date:   11/17/2023     Scheduling Instructions:      1) Due to current limited availability of the COVID-19 test, tests will be prioritized based on responses to questions above. Testing may be delayed due to volume. 2) Print and instruct patient to adhere to CDC home isolation program. (Link Above)              3) Set up or refer patient for a monitoring program.              4) Have patient sign up for and leverage MyChart (if not previously done). Order Specific Question:   Is this test for diagnosis or screening? Answer:   Screening     Order Specific Question:   Symptomatic for COVID-19 as defined by CDC? Answer:   No     Order Specific Question:   Date of Symptom Onset     Answer:   N/A     Order Specific Question:   Hospitalized for COVID-19? Answer:   No     Order Specific Question:   Admitted to ICU for COVID-19? Answer:   No     Order Specific Question:   Employed in healthcare setting? Answer:   No     Order Specific Question:   Resident in a congregate (group) care setting? Answer:   No     Order Specific Question:   Pregnant? Answer:   No     Order Specific Question:   Previously tested for COVID-19? Answer:   Yes    POCT COVID-19, Antigen     Order Specific Question:   Is this test for diagnosis or screening? Answer:   Screening     Order Specific Question:   Symptomatic for COVID-19 as defined by CDC? Answer:   No     Order Specific Question:   Date of Symptom Onset     Answer:   N/A     Order Specific Question:   Hospitalized for COVID-19? Answer:   No     Order Specific Question:   Admitted to ICU for COVID-19? Answer:   No     Order Specific Question:   Employed in healthcare setting? Answer:   No     Order Specific Question:   Resident in a congregate (group) care setting? Answer:   No     Order Specific Question:   Pregnant? Answer:   No     Order Specific Question:   Previously tested for COVID-19? Answer:   Yes    POCT Influenza A/B        Labs:  Results for POC orders placed in visit on 11/17/22   POCT Influenza A/B   Result Value Ref Range    Influenza A Ab neg     Influenza B Ab neg      IMAGING:  No orders to display     Scheduled Meds:  Continuous Infusions:  PRN Meds:.

## 2022-11-18 ENCOUNTER — HOSPITAL ENCOUNTER (OUTPATIENT)
Dept: PHYSICAL THERAPY | Age: 47
Setting detail: THERAPIES SERIES
Discharge: HOME | End: 2022-11-18
Payer: COMMERCIAL

## 2022-11-18 NOTE — PROGRESS NOTES
Physical Therapy: Daily Note   Patient: America Duarte (13 y.o. female)   Examination Date:   Plan of Care/Certification Expiration Date: 11/10/22    No data recorded   :  1975 # of Visits since [facility]:   7   MRN: [id number]  CSN: [id number] Start of Care Date:   10/10/2022   Insurance: Payor: [employer] / Plan: [employer] M[employer] LANG - EXCHANGE / Product Type: *No Product type* /   Insurance ID: [id number] - (Commercial) Secondary Insurance (if applicable):    Referring Physician: MD Dr Lilly Patel   PCP: Leda Wong MD Visits to Date/Visits Approved:   ()    No Show/Cancelled Appts:      Medical Diagnosis: Pathological fracture, unspecified humerus, initial encounter for fracture [O46.427W] s/p ORIF Right humerus due to pathological fx (recently dx with lymphoma)  Treatment Diagnosis: s/p ORIF right humerus due to 2 pathological fxs (dx with lymphoma)        SUBJECTIVE EXAMINATION   Pain Level:      Patient Comments: Subjective: No show for appointment. HEP Compliance:         OBJECTIVE EXAMINATION   Restrictions:  No data recorded No data recorded No data recorded              TREATMENT         Pt Education: Additional Comments: *Pt dx with Lymphoma in Aug 2022 and is currently undergoing chemo and radiation* NO ESTIM OR US; Okay KT tape, CP, HP       ASSESSMENT     Assessment: Assessment: No show for appontment. Body Structures, Functions, Activity Limitations Requiring Skilled Therapeutic Intervention: Decreased functional mobility , Decreased ROM, Decreased strength, Decreased endurance, Increased pain    Post-Treatment Pain Level: Activity Tolerance: Patient limited by endurance;  Patient limited by pain    Therapy Prognosis: Good       GOALS   Patient Goals : Be able to use her right arm overhead  Short Term Goals Completed by 1-2 weeks from date of evaluation Current Status Goal Status   Pt reports having 3/10 or less right shoulder pain with ADLS GOAL nearly met Pt reports 3-4/10 pain during ADLs up to 710 during P/AAROM occasionally In progress   Pt reports able to complete her HEP 3-5x per week pt at least doing HEP 4-5/week Met                                                                       Long Term Goals Completed by 4-6 weeks from date of recheck on 11/15/2022 Current Status Goal Status   Pt reports having 2/10 or less R UE pain while performing ADLS and work duties Pt reports 3-4/10 pain on ave during ADLs In progress   Increase AROM of right shoulder to zkowzid=822 degrees, abduction =140 degrees and IR= able to reach to her toleraance for improved function GOAL not yet met however ROM improving Recheck on 11/15/2022: Shoulder flexion= 130 Right shoudler abduction=   100 deg   IR= Functional IR thumb to L1-2     Increase RUE strength to 4+/5 or >so that pt can perform overhead activities without increased pain GOAL not yet met however strength slowly improving In progress   Improve her Spadi (Shoulder Pain & Disability Index) from 34% disability on eval to <10% by time of DC Spadi at recheck= 20% imprioved from 34% at time of IE     Pt indep with an advanced HEP progressing HEP as tolerated                                                  TREATMENT PLAN   Plan Frequency: 1-2x per week  Plan weeks: 4-6 weeks  Current Treatment Recommendations: Strengthening, ROM, Functional mobility training, Manual Therapy - Soft Tissue Mobilization, Pain management, Home exercise program, Safety education & training, Modalities   Additional Comments: *Pt dx with Lymphoma in Aug 2022 and is currently undergoing chemo and radiation* NO ESTIM OR US; Okay KT tape, CP, HP       Therapy Time  Individual Time In:  215       Individual Time Out:  225  Minutes:  5 no show, wait 10 minutes        Electronically signed by Pasha Alexis PTA  on 11/18/2022 at 2:33 PM   POC NOTE  Physical Therapy

## 2022-11-19 LAB — SARS-COV-2, PCR: NOT DETECTED

## 2022-11-22 ENCOUNTER — HOSPITAL ENCOUNTER (OUTPATIENT)
Dept: PHYSICAL THERAPY | Age: 47
Setting detail: THERAPIES SERIES
Discharge: HOME | End: 2022-11-22
Payer: COMMERCIAL

## 2022-11-22 PROCEDURE — 97110 THERAPEUTIC EXERCISES: CPT

## 2022-11-22 PROCEDURE — 97140 MANUAL THERAPY 1/> REGIONS: CPT

## 2022-11-22 ASSESSMENT — PAIN SCALES - GENERAL: PAINLEVEL_OUTOF10: 3

## 2022-11-22 ASSESSMENT — PAIN DESCRIPTION - LOCATION: LOCATION: SHOULDER

## 2022-11-22 ASSESSMENT — PAIN - FUNCTIONAL ASSESSMENT: PAIN_FUNCTIONAL_ASSESSMENT: PREVENTS OR INTERFERES SOME ACTIVE ACTIVITIES AND ADLS

## 2022-11-22 ASSESSMENT — PAIN DESCRIPTION - FREQUENCY: FREQUENCY: CONTINUOUS

## 2022-11-22 ASSESSMENT — PAIN DESCRIPTION - ORIENTATION: ORIENTATION: RIGHT

## 2022-11-22 ASSESSMENT — PAIN DESCRIPTION - DESCRIPTORS: DESCRIPTORS: ACHING;TIGHTNESS;OTHER (COMMENT)

## 2022-11-22 ASSESSMENT — PAIN DESCRIPTION - PAIN TYPE: TYPE: SURGICAL PAIN

## 2022-11-22 NOTE — PROGRESS NOTES
Daily Treatment Note  Date: 2022  Patient Name: Faizan Garnett  MRN: [id number]     :   1975    Referring Physician: MD Dr Crissy Dowell   PCP: Hai Fong MD    Medical Diagnosis: Pathological fracture, unspecified humerus, initial encounter for fracture [P98.331T]    Treatment Diagnosis: s/p ORIF right humerus due to 2 pathological fxs (dx with lymphoma)      Insurance: Payor: [employer] / Plan: MED[employer] M[employer]UAL LANG - EXCHANGE / Product Type: *No Product type* /   Insurance ID: [id number] - (Commercial)    Subjective:   General  Referring Provider (secondary): Dr Crissy Rand  PT Visit Information  Onset Date: 08/15/22  PT Insurance Information: (40 per year)  Total # of Visits Approved:  (13-17)  Total # of Visits to Date: 10  No Show: 1  Canceled Appointment: 0  Referring Provider (secondary): Dr Crissy Rand  Subjective  Subjective: Pt. reports she had a horrible migraine is why she missed last session and fell asleep. Pt. reports doing better now shoulder stiffness/tightness and swelling. Pt. reports is sleeping better, icing 2-3 x per day. Pt. states tingling at end range shd flexion only.   Pain Screening  Patient Currently in Pain: Yes  Pain Assessment: 0-10  Pain Level: 3  Patient's Stated Pain Goal: 0 - No pain  Pain Type: Surgical pain  Pain Location: Shoulder  Pain Orientation: Right  Pain Radiating Towards: R shd  Pain Descriptors: Aching, Tightness, Other (Comment) (stiffness)  Pain Frequency: Continuous  Functional Pain Assessment: Prevents or interferes some active activities and ADLs  Aggravating factors: Lifting, Carrying, Reaching  Pain Management/Relieving Factors: Ice, Rest       Treatment Activities:  Exercises:      Treatment Reasoning    Exercise 1: shoulder internal rotation stretch with pillow case 30 sec x 4  Exercise 4: pulleys flexion and abduction x 10 H5''  Exercise 5: wall slides RUE flexion with eccentric abduction (better tolerance this date less support of needing elbow at wall)   H5'' x 10  Exercise 8: wall slides abduction with eccentric flexion; H5''x 8 no inc inc nerve pain focusing on scap setting  Exercise 9: mid rows RTB x 10 H5 sec  Exercise 10: bilat shoulder ext x 10 RTB H 4  Exercise 11: ER x 10 hold 3-5 sec YTB  Exercise 12: IR x10 H 3 YTB  Exercise 13: cervical retraction x 10 hold 3 sec standing against wall    Limitations addressed: Mobility, Strength, Flexibility, Activity tolerance, Posture, Pain modulation  Therapist provided: Verbal cuing, Tactile cuing  Progressed: Repetitions, Duration                     Manual Therapy: (CPT 22250) Treatment Reasoning     Joint Mobilization: PROM  R shoulder flexion, abduction and IR. gentle oscillation mobs GH gr I-II for pain reduction and  to promote relaxation, passive stretching to R UT with OP to dec tightness  Soft Tissue Mobilizaton: MFR over R deltoid tuberosity to dec pain and tightness, suboccipital release, MFR R UT to dec tightness                       Assessment:   Conditions Requiring Skilled Therapeutic Intervention  Body Structures, Functions, Activity Limitations Requiring Skilled Therapeutic Intervention: Decreased functional mobility ; Decreased ROM; Decreased strength;Decreased endurance; Increased pain  Assessment: Pt has been having some swelling in R shd as well as in base of neck. Reviewed cervical retraction and verbalized scap retractions against wall for dec strain on neck. Pt. however demoing inc tolerance to AROM and strengthening therex and no reports of nerve pain this date. Only tightness reported. Occ RB's between exercises due to muscle fatigue. Cont to inc as tolerates. Pt. encouraged CP to neck as well for pain reduction and dec swelling. Pain post 2.5/10.   Treatment Diagnosis: s/p ORIF right humerus due to 2 pathological fxs (dx with lymphoma)  Therapy Prognosis: Good  Requires PT Follow-Up: Yes  Activity Tolerance  Activity Tolerance: Patient limited by endurance; Patient limited by pain; Patient tolerated treatment well      G-Code:       OutComes Score:                                                     Goals:  Short Term Goals  Time Frame for Short Term Goals: 1-2 weeks from date of evaluation  Short Term Goal 1: Pt reports having 3/10 or less right shoulder pain with ADLS  STG 1 Current Status[de-identified] GOAL nearly met Pt reports 3-4/10 pain during ADLs up to 710 during P/AAROM occasionally  STG Goal 1 Status[de-identified] In progress  Short Term Goal 2: Pt reports able to complete her HEP 3-5x per week  STG 2 Current Status[de-identified] pt at least doing HEP 4-5/week  STG Goal 2 Status[de-identified] Met  Long Term Goals  Time Frame for Long Term Goals : 4-6 weeks from date of recheck on 11/15/2022  Long Term Goal 1: Pt reports having 2/10 or less R UE pain while performing ADLS and work duties  LTG 1 Current Status[de-identified] Pt reports 3-4/10 pain on ave during ADLs  LTG Goal 1 Status[de-identified] In progress  Long Term Goal 2: Increase AROM of right shoulder to zetwfuf=315 degrees, abduction =140 degrees and IR= able to reach to her toleraance for improved function  LTG 2 Current Status[de-identified] GOAL not yet met however ROM improving Recheck on 11/15/2022: Shoulder flexion= 130 Right shoudler abduction=   100 deg   IR= Functional IR thumb to L1-2  LTG Goal 2 Status[de-identified] In progress  Long Term Goal 3:  Increase RUE strength to 4+/5 or >so that pt can perform overhead activities without increased pain  LTG 3 Current Status[de-identified] GOAL not yet met however strength slowly improving  LTG Goal 3 Status[de-identified] In progress  Patient Goals   Patient Goals : Be able to use her right arm overhead    Plan:    Physcial Therapy Plan  Plan weeks: 4-6 weeks  Current Treatment Recommendations: Strengthening, ROM, Functional mobility training, Manual Therapy - Soft Tissue Mobilization, Pain management, Home exercise program, Safety education & training, Modalities  Additional Comments: *Pt dx with Lymphoma in Aug 2022 and is currently undergoing chemo and radiation* NO ESTIM OR US; Anna KT tape, CP, HP        Therapy Time:   Individual Concurrent Group Co-treatment   Time In  1100         Time Out  1145         Minutes  39                 Luz Cochran [place]         Physical Therapy

## 2022-11-27 DIAGNOSIS — F07.81 POST-CONCUSSION SYNDROME: ICD-10-CM

## 2022-11-28 RX ORDER — METHYLPHENIDATE HYDROCHLORIDE 10 MG/1
TABLET ORAL
Qty: 60 TABLET | Refills: 0 | Status: SHIPPED | OUTPATIENT
Start: 2022-11-28 | End: 2022-12-30

## 2022-11-28 NOTE — TELEPHONE ENCOUNTER
Pharmacy is requesting medication refill.  Please approve or deny this request.    Rx requested:  Requested Prescriptions     Pending Prescriptions Disp Refills    methylphenidate (RITALIN) 10 MG tablet [Pharmacy Med Name: methylphenidate 10 mg tablet] 60 tablet 0     Sig: TAKE 1 TABLET BY MOUTH EVERY MORNING and TAKE 1 TABLET at 2pm         Last Office Visit:   7/19/2022      Next Visit Date:  Future Appointments   Date Time Provider Marleny Ta   11/29/2022 11:00 AM Hugo Prabhakar MD [facility]   11/29/2022  2:86 PM Juwan Esteban, PTA [address] [facility]   12/1/2022 11:00 AM [facility]   1/17/2023  2:45 PM David Ge MD [address] Neurology -

## 2022-11-29 ENCOUNTER — HOSPITAL ENCOUNTER (OUTPATIENT)
Dept: PHYSICAL THERAPY | Age: 47
Setting detail: THERAPIES SERIES
Discharge: HOME | End: 2022-11-29
Payer: COMMERCIAL

## 2022-11-29 ENCOUNTER — OFFICE VISIT (OUTPATIENT)
Dept: FAMILY MEDICINE CLINIC | Age: 47
End: 2022-11-29
Payer: COMMERCIAL

## 2022-11-29 VITALS
SYSTOLIC BLOOD PRESSURE: 126 MMHG | HEIGHT: 66 IN | WEIGHT: 242 LBS | HEART RATE: 107 BPM | OXYGEN SATURATION: 97 % | DIASTOLIC BLOOD PRESSURE: 80 MMHG | BODY MASS INDEX: 38.89 KG/M2 | TEMPERATURE: 96.7 F

## 2022-11-29 DIAGNOSIS — Z12.31 ENCOUNTER FOR SCREENING MAMMOGRAM FOR MALIGNANT NEOPLASM OF BREAST: ICD-10-CM

## 2022-11-29 DIAGNOSIS — E11.9 TYPE 2 DIABETES MELLITUS WITHOUT COMPLICATION, UNSPECIFIED WHETHER LONG TERM INSULIN USE (HCC): Primary | ICD-10-CM

## 2022-11-29 PROCEDURE — 3051F HG A1C>EQUAL 7.0%<8.0%: CPT | Performed by: FAMILY MEDICINE

## 2022-11-29 PROCEDURE — 99214 OFFICE O/P EST MOD 30 MIN: CPT | Performed by: FAMILY MEDICINE

## 2022-11-29 PROCEDURE — 97140 MANUAL THERAPY 1/> REGIONS: CPT

## 2022-11-29 PROCEDURE — 97110 THERAPEUTIC EXERCISES: CPT

## 2022-11-29 RX ORDER — LANCETS 30 GAUGE
1 EACH MISCELLANEOUS 2 TIMES DAILY
Qty: 100 EACH | Refills: 1 | Status: SHIPPED | OUTPATIENT
Start: 2022-11-29

## 2022-11-29 RX ORDER — GLUCOSAMINE HCL/CHONDROITIN SU 500-400 MG
CAPSULE ORAL
Qty: 100 STRIP | Refills: 0 | Status: SHIPPED | OUTPATIENT
Start: 2022-11-29

## 2022-11-29 ASSESSMENT — PAIN DESCRIPTION - LOCATION: LOCATION: SHOULDER

## 2022-11-29 ASSESSMENT — PAIN DESCRIPTION - DESCRIPTORS: DESCRIPTORS: ACHING;DULL

## 2022-11-29 ASSESSMENT — PAIN DESCRIPTION - ORIENTATION: ORIENTATION: RIGHT

## 2022-11-29 ASSESSMENT — PAIN SCALES - GENERAL: PAINLEVEL_OUTOF10: 3

## 2022-11-29 ASSESSMENT — PAIN DESCRIPTION - PAIN TYPE: TYPE: SURGICAL PAIN

## 2022-11-29 NOTE — PROGRESS NOTES
Chief Complaint   Patient presents with    6 Month Follow-Up     To review A1c     Discuss Medications     Patient wants to discuss medications on weight loss        HPI: Dickson Canavan 56 y.o. female presenting for     Type 2 diabetes   New diagnosis   Has improved her diet   Unsure if it is steroid induce (from the chemotherapy and steroids). Alexia does not check her sugars at home. Hemoglobin A1C   Date Value Ref Range Status   11/17/2022 7.4 (H) 4.8 - 5.9 % Final     Lymphoma B cell diffuse   Had her 5 treatment recently. Is on steroids   Does exercise 10 minutes on the recumbent bike does elevate the heart rate. F/u   Finishing up chemo   Still on steroids   Follows up with oncology. Current Outpatient Medications   Medication Sig Dispense Refill    methylphenidate (RITALIN) 10 MG tablet TAKE 1 TABLET BY MOUTH EVERY MORNING and TAKE 1 TABLET at 2pm 60 tablet 0    etonogestrel-ethinyl estradiol (NUVARING) 0.12-0.015 MG/24HR vaginal ring Vaginal: One ring, inserted vaginally and left in place continuously for 3 consecutive weeks, then removed for 1 week.  A new ring is inserted 7 days after the last was removed 3 each 3    pegfilgrastim (NEULASTA) 6 MG/0.6ML injection Inject 6 mg into the skin once      allopurinol (ZYLOPRIM) 300 MG tablet Take 300 mg by mouth daily      predniSONE (DELTASONE) 20 MG tablet Take 20 mg by mouth daily      ondansetron (ZOFRAN ODT) 4 MG disintegrating tablet Take 1 tablet by mouth every 8 hours as needed for Nausea or Vomiting 14 tablet 0    Na Sulfate-K Sulfate-Mg Sulf (SUPREP) 17.5-3.13-1.6 GM/177ML SOLN solution As directed 1 each 0    busPIRone (BUSPAR) 5 MG tablet Take 1 tablet by mouth 2 times daily 60 tablet 2    dicyclomine (BENTYL) 10 MG capsule Take 1 capsule by mouth 3 times daily as needed (abdominal pain) 90 capsule 1    pantoprazole (PROTONIX) 20 MG tablet Take 2 tablets by mouth daily 60 tablet 0    ondansetron (ZOFRAN ODT) 4 MG disintegrating tablet Take 1 tablet by mouth every 8 hours as needed for Nausea 20 tablet 0    cyclobenzaprine (FLEXERIL) 10 MG tablet Take 10 mg by mouth 2 times daily as needed for Muscle spasms       gabapentin (NEURONTIN) 300 MG capsule Take 4 capsules by mouth 2 times daily for 30 days. 240 capsule 0     No current facility-administered medications for this visit. ROS  CONSTITUTIONAL: The patient denies fevers, chills, sweats and body ache. HEENT: Denies headache, blurry vision, eye pain, tinnitus, vertigo,  sore throat, neck or thyroid masses. RESPIRATORY: Denies cough, sputum, hemoptysis. CARDIAC: Denies chest pain, pressure, palpitations, Denies lower extremity edema. GASTROINTESTINAL: Denies abdominal pain, constipation, diarrhea, bleeding in the stools,   GENITOURINARY: Denies dysuria, hematuria, nocturia or frequency, urinary incontinence. NEUROLOGIC: Denies headaches, dizziness, syncope, weakness  MUSCULOSKELETAL: Chronic pain in the shoulders, neck, back, hip, hands. ENDOCRINOLOGY: Denies heat or cold intolerance. HEMATOLOGY: Denies easy bleeding or blood transfusion,anemia  DERMATOLOGY: Denies changes in moles or pigmentation changes. PSYCHIATRY: Denies depression, agitation or anxiety.     Past Medical History:   Diagnosis Date    HLD (hyperlipidemia)     Migraine with aura and without status migrainosus, not intractable     Type 2 diabetes mellitus ([facility] [address] 11/2022        Past Surgical History:   Procedure Laterality Date    HAND SURGERY Right     Middle finger trigger finger release    IR TUNNELED CATHETER PLACEMENT GREATER THAN 5 YEARS  9/13/2022    IR TUNNELED CATHETER PLACEMENT GREATER THAN 5 YEARS    TONSILLECTOMY          Family History   Problem Relation Age of Onset    No Known Problems Mother     Diabetes Father     Hypertension Father         Social History     Socioeconomic History    Marital status: Single     Spouse name: Not on file    Number of children: Not on file    Years of education: Not on file    Highest education level: Not on file   Occupational History    Not on file   Tobacco Use    Smoking status: Former    Smokeless tobacco: Current     Types: Chew   Substance and Sexual Activity    Alcohol use: Yes    Drug use: Never    Sexual activity: Not on file   Other Topics Concern    Not on file   Social History Narrative    Not on file     Social Determinants of Health     Financial Resource Strain: Low Risk     Difficulty of Paying Living Expenses: Not hard at all   Food Insecurity: No Food Insecurity    Worried About Running Out of Food in the Last Year: Never true    Ran Out of Food in the Last Year: Never true   Transportation Needs: Not on file   Physical Activity: Not on file   Stress: Not on file   Social Connections: Not on file   Intimate Partner Violence: Not on file   Housing Stability: Not on file        /80   Pulse (!) 107   Temp (!) 96.7 °F (35.9 °C)   Ht 5' 6\" (1.676 m)   Wt 242 lb (109.8 kg)   LMP 11/01/2022 (Approximate)   SpO2 97%   BMI 39.06 kg/m²        Physical Exam:    General appearance - alert, well appearing, and in no distress, morbidly obese  Mental Status - alert, oriented to person, place, and time  Eyes - pupils equal and reactive, extraocular eye movements intact   Ears - bilateral TM's and external ear canals normal   Nose - normal and patent, no erythema, discharge or polyps   Sinuses - Normal paranasal sinuses without tenderness   Throat - mucous membranes moist, pharynx normal without lesions   Neck - supple, no significant adenopathy   Thyroid - thyroid is normal in size without nodules or tenderness    Chest - clear to auscultation, no wheezes, rales or rhonchi, symmetric air entry   Heart - normal rate, regular rhythm, normal S1, S2, no murmurs, rubs, clicks or gallops  Abdomen - soft, nontender, nondistended, no masses or organomegaly   Back exam -limited range of motion.     Neurological - alert, oriented, normal speech, no focal findings or movement No other lymphadenopathy in the inferior thorax, abdomen or    pelvis identified. Differential consideration includes partially    calcified granulomatous involvement, Castleman's disease or other soft    tissue posterior mediastinal mass. Please correlate with prior imaging,    if available. Nonemergent dedicated imaging of the chest is recommended    for further evaluation. RUQ 5/11/22  1. A heterogeneous area is seen in the superior aspect of the right lobe of the liver. Recommend MRI for further evaluation. 2.  No evidence of cholecystitis or Cholelithiasis. Small polyp is seen in the gallbladder wall. A/P: Brooke Brenner 56 y.o. female presenting for       1. Type 2 diabetes mellitus without complication, unspecified whether long term insulin use (HCC)  Diabetes education, monitor sugars. I wonder if symptoms are secondary to steroids and chemo. Hold off on medication at this time. Will repeat and see where her sugars are in 3 months. - blood glucose monitor kit and supplies; Use as directed for type 2 diabetes. Please cover what is under insurance. Dispense: 1 kit; Refill: 0  - blood glucose monitor strips; Test 2 times a day & as needed for symptoms of irregular blood glucose. Dispense sufficient amount for indicated testing frequency plus additional to accommodate PRN testing needs. Use as directed for type 2 diabetes. Please cover what is under insurance. Dispense: 100 strip; Refill: 0  - [facility], [place]  - Lancets MISC; 1 each by Does not apply route 2 times daily  Dispense: 100 each; Refill: 1  - Hemoglobin A1C; Future    2. Encounter for screening mammogram for malignant neoplasm of breast    - LUIS ALBERTO DIGITAL SCREEN W OR WO CAD BILATERAL;  Future              Please note, this report has been partially produced using speech recognition software  and may cause  and /or contain errors related to that system including grammar, punctuation and spelling as well as words and phrases that may seem inappropriate. If there are questions or concerns please feel free to contact me to clarify.

## 2022-11-29 NOTE — PROGRESS NOTES
Physical Therapy: Daily Note   Patient: Racheal Dao (13 y.o. female)   Examination Date:   Plan of Care/Certification Expiration Date: 12/15/22    No data recorded   :  1975 # of Visits since [facility]:   9   MRN: [id number]  CSN: [id number] Start of Care Date:   10/10/2022   Insurance: Payor: [employer] / Plan: [employer] [facility] / Product Type: *No Product type* /   Insurance ID: [id number] - (Commercial) Secondary Insurance (if applicable):    Referring Physician: MD Dr Galilea Rizzo   PCP: Alicja Winslow MD Visits to Date/Visits Approved:   (-)    No Show/Cancelled Appts:      Medical Diagnosis: Pathological fracture, unspecified humerus, initial encounter for fracture [C17.452U]    Treatment Diagnosis: s/p ORIF right humerus due to 2 pathological fxs (dx with lymphoma)        SUBJECTIVE EXAMINATION   Pain Level: Pain Screening  Patient Currently in Pain: Yes  Pain Assessment: 0-10  Pain Level: 3  Patient's Stated Pain Goal: 0 - No pain  Pain Type: Surgical pain  Pain Location: Shoulder  Pain Orientation: Right  Pain Descriptors: Aching, Dull    Patient Comments: Subjective: Pt reports having 3/10 \"dull and achy\" right shoulder    HEP Compliance: Good                     TREATMENT     Exercises:      Treatment Reasoning    Exercise 1: shoulder internal rotation stretch with pillow case 30 sec x 4  Exercise 4: pulleys flexion and abduction x 10 H5''  Exercise 5: wall slides RUE flexion with eccentric abduction (better tolerance this date less support of needing elbow at wall)   H5'' x 10  Exercise 9: mid rows RTB x 10 H5 sec  Exercise 10: bilat shoulder ext x 10 RTB H 4  Exercise 11: ER x 10 hold 3-5 sec YTB  Exercise 12: IR x10 H 3 YTB                              Manual Therapy: (CPT 11447) Treatment Reasoning     Joint Mobilization: PROM with distraction R shoulder flexion, abduction and IR/ER.  gentle oscillation mobs GH for pain reduction and  to promote relaxation; Also cervical distraction and STM of base of skull to decrease increase UT tightness and right UE tightness  Other: KT tape applied to right post shoulder at diagonal anchored distal pulled at 35-40% in a inferior medial direction for posture and postioning of the right scapula; A second piece with placed just inferior to first with same pull                      Pt Education: Additional Comments: *Pt dx with Lymphoma in Aug 2022 and is currently undergoing chemo and radiation* NO ESTIM OR US; Okay KT tape, CP, HP       ASSESSMENT     Assessment: Assessment: Pt arrives to therapy with reports of 3/10 \"achy, dull\" right anterior/lateral shoulder pain. Pt performed pulleys and wall slides with good tolerance and then performed band ther ex with reports of increased fatigued and pt needing to take a rest break. PT then performed manual PROM to increase ROM and increase R UE function and decrease tightness. PT then applied KT tape to right post scap for postural control and scap positioning. Pt to ice at home and reports pain decreased to \"2 and a half\" post tx session. Continue to progress with ROM and strengthening as able to tolerate. Body Structures, Functions, Activity Limitations Requiring Skilled Therapeutic Intervention: Decreased functional mobility , Decreased ROM, Decreased strength, Decreased endurance, Increased pain    Post-Treatment Pain Level:  2.5/10     Activity Tolerance: Patient limited by endurance; Patient limited by pain;  Patient tolerated treatment well    Therapy Prognosis: Good       GOALS   Patient Goals : Be able to use her right arm overhead  Short Term Goals Completed by 1-2 weeks from date of evaluation Current Status Goal Status   Pt reports having 3/10 or less right shoulder pain with ADLS GOAL nearly met Pt reports 3-4/10 pain during ADLs up to 710 during P/AAROM occasionally In progress   Pt reports able to complete her HEP 3-5x per week pt at least doing HEP 4-5/week Met                                                                       Long Term Goals Completed by 4-6 weeks from date of recheck on 11/15/2022 Current Status Goal Status   Pt reports having 2/10 or less R UE pain while performing ADLS and work duties Pt reports 3-4/10 pain on ave during ADLs In progress   Increase AROM of right shoulder to ensklel=441 degrees, abduction =140 degrees and IR= able to reach to her toleraance for improved function GOAL not yet met however ROM improving Recheck on 11/15/2022: Shoulder flexion= 130 Right shoudler abduction=   100 deg   IR= Functional IR thumb to L1-2 In progress   Increase RUE strength to 4+/5 or >so that pt can perform overhead activities without increased pain GOAL not yet met however strength slowly improving In progress                                                                TREATMENT PLAN   Plan Frequency: 1-2x per week  Plan weeks: 4-6 weeks  Current Treatment Recommendations: Strengthening, ROM, Functional mobility training, Manual Therapy - Soft Tissue Mobilization, Pain management, Home exercise program, Safety education & training, Modalities   Additional Comments: *Pt dx with Lymphoma in Aug 2022 and is currently undergoing chemo and radiation* NO ESTIM OR US; Anna KT tape, CP, HP       Therapy Time  Individual Time In:      1:30pm   Individual Time Out:   2:20pm                                        50 min         Electronically signed by Nichole Saavedra PT  on 11/29/2022 at 5:09 PM

## 2022-12-01 ENCOUNTER — HOSPITAL ENCOUNTER (OUTPATIENT)
Dept: PHYSICAL THERAPY | Age: 47
Setting detail: THERAPIES SERIES
Discharge: HOME | End: 2022-12-01
Payer: COMMERCIAL

## 2022-12-01 PROCEDURE — 97140 MANUAL THERAPY 1/> REGIONS: CPT

## 2022-12-01 PROCEDURE — 97110 THERAPEUTIC EXERCISES: CPT

## 2022-12-01 ASSESSMENT — PAIN SCALES - GENERAL: PAINLEVEL_OUTOF10: 3

## 2022-12-01 ASSESSMENT — PAIN DESCRIPTION - ORIENTATION: ORIENTATION: RIGHT

## 2022-12-01 ASSESSMENT — PAIN DESCRIPTION - LOCATION: LOCATION: SHOULDER

## 2022-12-01 ASSESSMENT — PAIN DESCRIPTION - DESCRIPTORS: DESCRIPTORS: ACHING

## 2022-12-01 NOTE — PROGRESS NOTES
Physical Therapy: Daily Note   Patient: Sandra Das (20 y.o. female)   Examination Date:   Plan of Care/Certification Expiration Date: 12/15/22    No data recorded   :  1975 # of Visits since [facility]:   10   MRN: [id number]  CSN: [id number] Start of Care Date:   10/10/2022   Insurance: Payor: [employer] / Plan: [employer] [facility] / Product Type: *No Product type* /   Insurance ID: [id number] - (Commercial) Secondary Insurance (if applicable):    Referring Physician: MD Dr Rodney Rose   PCP: La Salazar MD Visits to Date/Visits Approved:   (-)    No Show/Cancelled Appts:      Medical Diagnosis: Pathological fracture, unspecified humerus, initial encounter for fracture [V25.910T]    Treatment Diagnosis: s/p ORIF right humerus due to 2 pathological fxs (dx with lymphoma)        SUBJECTIVE EXAMINATION   Pain Level: Pain Screening  Patient Currently in Pain: Yes  Pain Assessment: 0-10  Pain Level: 3  Pain Location: Shoulder  Pain Orientation: Right  Pain Descriptors: Aching    Patient Comments: Subjective: Pt feels as though the last couple days have been rough but today is a better day. Pt states she has a F/U with ortho tomorrow (5 month check up).     HEP Compliance: Good        OBJECTIVE EXAMINATION   Restrictions:  No data recorded No data recorded No data recorded      Left AROM  Right AROM        AROM RUE (degrees)  R Shoulder Flexion (0-180): supine ~120 c/o grabbing (PROM 0-165 with distraction)  R Shoulder ABduction (0-180): 0-134 deg supine (PROM 0-160 with distraction)  R Shoulder Ext Rotation (0-90): 84 deg at 50 deg abd           TREATMENT     Exercises:      Treatment Reasoning    Exercise 1: shoulder internal rotation stretch with pillow case 30 sec x 4  Exercise 4: pulleys flexion and abduction x 10 H5''  Exercise 5: wall slides RUE flexion with eccentric abduction   H5'' x 10 (c/o grinding in scapula but no pain)  Exercise 6: wall slides RUE abduction with eccentric flexion x 10 H5''  Exercise 9: mid rows RTB x 10 H5 sec  Exercise 10: bilat shoulder ext x 10 RTB H5  Exercise 11: BUE ER x 10-15 hold 3-5 sec YTB                          Manual Therapy: (CPT 10031) Treatment Reasoning     Joint Mobilization: PROM with distraction R shoulder flexion, abduction and IR/ER. gentle oscillation mobs GH for pain reduction and  to promote relaxation; Also cervical distraction and STM of base of skull to decrease increase UT tightness and right UE tightness                      Pt Education: Additional Comments: *Pt dx with Lymphoma in Aug 2022 and is currently undergoing chemo and radiation* NO ESTIM OR US; Okay KT tape, CP, HP       ASSESSMENT     Assessment: Assessment: Pt tolerating inc reps this date with no c/o pain. Frequent RB's during tx for retracting shoulders and posture to dec pain. Pt experiencing \"grabbing\" during PROM which subsided with joint distraction during PROM. PROM to scapula with focus on upward rotation to dec tightness and inc ROM. Pain level 3/10 post. Donned CP post to dec pain. Continue with POC. Body Structures, Functions, Activity Limitations Requiring Skilled Therapeutic Intervention: Decreased functional mobility , Decreased ROM, Decreased strength, Decreased endurance, Increased pain    Post-Treatment Pain Level: Activity Tolerance: Patient limited by endurance; Patient limited by pain;  Patient tolerated treatment well    Therapy Prognosis: Good       GOALS   Patient Goals : Be able to use her right arm overhead  Short Term Goals Completed by 1-2 weeks from date of evaluation Current Status Goal Status   Pt reports having 3/10 or less right shoulder pain with ADLS GOAL nearly met Pt reports 3-4/10 pain during ADLs up to 710 during P/AAROM occasionally In progress   Pt reports able to complete her HEP 3-5x per week pt at least doing HEP 4-5/week Met Long Term Goals Completed by 4-6 weeks from date of recheck on 11/15/2022 Current Status Goal Status   Pt reports having 2/10 or less R UE pain while performing ADLS and work duties Pt reports 3-4/10 pain on ave during ADLs In progress   Increase AROM of right shoulder to [id number]=408 degrees, abduction =140 degrees and IR= able to reach to her toleraance for improved function GOAL not yet met however ROM improving Recheck on 11/15/2022: Shoulder flexion= 130 Right shoudler abduction=   100 deg   IR= Functional IR thumb to L1-2 In progress   Increase RUE strength to 4+/5 or >so that pt can perform overhead activities without increased pain GOAL not yet met however strength slowly improving In progress                                                                TREATMENT PLAN   Plan Frequency: 1-2x per week  Plan weeks: 4-6 weeks  Current Treatment Recommendations: Strengthening, ROM, Functional mobility training, Manual Therapy - Soft Tissue Mobilization, Pain management, Home exercise program, Safety education & training, Modalities   Additional Comments: *Pt dx with Lymphoma in Aug 2022 and is currently undergoing chemo and radiation* NO ESTIM OR US; Anna KT tape, CP, HP       Therapy Time  Individual Time In:       1100  Individual Time Out:  9739  Minutes:  45        Electronically signed by Grisel Tamez PTA  on 12/1/2022 at 11:55 AM   POC NOTE

## 2022-12-06 ENCOUNTER — HOSPITAL ENCOUNTER (OUTPATIENT)
Dept: PHYSICAL THERAPY | Age: 47
Setting detail: THERAPIES SERIES
Discharge: HOME | End: 2022-12-06
Payer: COMMERCIAL

## 2022-12-06 PROCEDURE — 97140 MANUAL THERAPY 1/> REGIONS: CPT

## 2022-12-06 PROCEDURE — 97110 THERAPEUTIC EXERCISES: CPT

## 2022-12-06 ASSESSMENT — PAIN DESCRIPTION - LOCATION: LOCATION: SHOULDER

## 2022-12-06 ASSESSMENT — PAIN DESCRIPTION - PAIN TYPE: TYPE: SURGICAL PAIN

## 2022-12-06 NOTE — PROGRESS NOTES
Daily Treatment Note  Date: 2022  Patient Name: Darci Dailey  MRN: [id number]     :   1975    Referring Physician: MD Dr Teddy Healy   PCP: Faustina Bautista MD    Medical Diagnosis: Pathological fracture, unspecified humerus, initial encounter for fracture [V55.996X]    Treatment Diagnosis: s/p ORIF right humerus due to 2 pathological fxs (dx with lymphoma)      Insurance: Payor: [employer] / Plan: [address] [place] [address] / Product Type: *No Product type* /   Insurance ID: [id number] - (Commercial)    Subjective:   General  Referring Provider (secondary): Dr Teddy Bolanos  PT Visit Information  Onset Date: 08/15/22  PT Insurance Information: (40 per year)  Total # of Visits Approved:  (13-17)  Total # of Visits to Date: 15  Plan of Care/Certification Expiration Date: 12/15/22  No Show: 1  Canceled Appointment: 0  Referring Provider (secondary): Dr Teddy Bolanos  Subjective  Subjective: Folllow up with Ortho went well per pt. bone heeling well. Pain Screening  Patient Currently in Pain: Yes  Pain Assessment: 0-10  Pain Level:  (2.75)  Pain Type: Surgical pain  Pain Location: Shoulder       Treatment Activities:  Exercises:      Treatment Reasoning    Exercise 1: shoulder internal rotation stretch with pillow case 30 sec x 4  Exercise 4: pulleys flexion and abduction x 10 H5''  Exercise 5: wall slides RUE flexion with eccentric abduction   H5'' x 10  Exercise 6: wall slides RUE abduction with eccentric flexion x 10 H5''  Exercise 8: wall slides abduction with eccentric flexion; H5''x 5 mild  inc nerve pain focusing on scap setting dec reps tolerated due to X ray earlier today. Exercise 9: mid rows RTB x 10 H5 sec  Exercise 10: bilat shoulder ext x 10 RTB H5  Exercise 11: BUE ER x 10-15 hold 3-5 sec YTB                          Manual Therapy: (CPT 24765) Treatment Reasoning     Joint Mobilization: PROM with distraction R shoulder flexion, abduction and IR/ER.  gentle oscillation mobs GH for pain reduction and  to promote relaxation guarded this date with pain up to 4-5/10 better with distraction less pain  Other: KT tape applied to right post shoulder at diagonal anchored distal pulled at 35-40% in a inferior medial direction for posture and postioning of the right scapula; A second piece with placed just inferior to first with same pull                       Assessment:   Conditions Requiring Skilled Therapeutic Intervention  Body Structures, Functions, Activity Limitations Requiring Skilled Therapeutic Intervention: Decreased functional mobility ; Decreased ROM; Decreased strength;Decreased endurance; Increased pain  Assessment: Pt. is still healing bone in proximal humerus but distal humerus heeled per pt. at Ortho appointment earlier today. Pt. reports shd alittle aggravated with positioning of shd during X ray. Continued with JACOB per grid with occ RB performing scap retrtactions and pendulums for pain releif during JACOB. Reapplied KT tape to dec pressure/pain ant shd. Pain up to 4-5/10 with PROM pt. guarded better with distraction during PROM for inc tolerance. CP issued to go for pain releif.   Treatment Diagnosis: s/p ORIF right humerus due to 2 pathological fxs (dx with lymphoma)  Therapy Prognosis: Good      G-Code:       OutComes Score:                                                     Goals:  Short Term Goals  Time Frame for Short Term Goals: 1-2 weeks from date of evaluation  Short Term Goal 1: Pt reports having 3/10 or less right shoulder pain with ADLS  STG 1 Current Status[de-identified] GOAL nearly met Pt reports 3-4/10 pain during ADLs up to 710 during P/AAROM occasionally  STG Goal 1 Status[de-identified] In progress  Short Term Goal 2: Pt reports able to complete her HEP 3-5x per week  STG 2 Current Status[de-identified] pt at least doing HEP 4-5/week  STG Goal 2 Status[de-identified] Met  Long Term Goals  Time Frame for Long Term Goals : 4-6 weeks from date of recheck on 11/15/2022  Long Term Goal 1: Pt reports having 2/10 or less R UE pain while performing ADLS and work duties  LTG 1 Current Status[de-identified] Pt reports 3-4/10 pain on ave during ADLs  LTG Goal 1 Status[de-identified] In progress  Long Term Goal 2: Increase AROM of right shoulder to gbfguhi=096 degrees, abduction =140 degrees and IR= able to reach to her toleraance for improved function  LTG 2 Current Status[de-identified] GOAL not yet met however ROM improving Recheck on 11/15/2022: Shoulder flexion= 130 Right shoudler abduction=   100 deg   IR= Functional IR thumb to L1-2  LTG Goal 2 Status[de-identified] In progress  Long Term Goal 3:  Increase RUE strength to 4+/5 or >so that pt can perform overhead activities without increased pain  LTG 3 Current Status[de-identified] GOAL not yet met however strength slowly improving  LTG Goal 3 Status[de-identified] In progress  Long Term Goal 4: Improve her Spadi (Shoulder Pain & Disability Index) from 34% disability on eval to <10% by time of DC  LTG 4 Current Status[de-identified] Spadi at recheck= 20% imprioved from 34% at time of IE  LTG Goal 4 Status[de-identified] In progress  Long Term Goal 5: Pt indep with an advanced HEP  LTG 5 Current Status[de-identified] progressing HEP as tolerated  LTG Goal 5 Status[de-identified] In progress  Patient Goals   Patient Goals : Be able to use her right arm overhead    Plan:         Plan Frequency: 1-2x per week  Plan weeks: 4-6 weeks  Current Treatment Recommendations: Strengthening, ROM, Functional mobility training, Manual Therapy - Soft Tissue Mobilization, Pain management, Home exercise program, Safety education & training, Modalities   Additional Comments: *Pt dx with Lymphoma in Aug 2022 and is currently undergoing chemo and radiation* NO ESTIM OR US; Anna KT tape, CP, HP       Therapy Time:   Individual Concurrent Group Co-treatment   Time In  215         Time Out  300         Minutes  39                 Samantha Duarte, [place]         Physical Therapy

## 2022-12-07 DIAGNOSIS — F07.81 POST-CONCUSSION SYNDROME: ICD-10-CM

## 2022-12-07 NOTE — TELEPHONE ENCOUNTER
Pharmacy is requesting medication refill. Please approve or deny this request.    Rx requested:  Requested Prescriptions     Pending Prescriptions Disp Refills    gabapentin (NEURONTIN) 300 MG capsule [Pharmacy Med Name: gabapentin 300 mg capsule] 240 capsule 0     Sig: Take 4 capsules by mouth 2 times daily for 30 days.          Last Office Visit:   7/19/2022      Next Visit Date:  Future Appointments   Date Time Provider Marleny Ta   12/8/2022 51:64 AM Patric Garcia, PTA [address] [place]   1/17/2023  2:45 PM Ananya Gonzalez MD [address] Neurology -   2/28/2023  1:30 PM Uche Barnes MD [address] [place] [address]

## 2022-12-08 ENCOUNTER — HOSPITAL ENCOUNTER (OUTPATIENT)
Dept: PHYSICAL THERAPY | Age: 47
Setting detail: THERAPIES SERIES
Discharge: HOME | End: 2022-12-08
Payer: COMMERCIAL

## 2022-12-08 PROCEDURE — 97110 THERAPEUTIC EXERCISES: CPT

## 2022-12-08 PROCEDURE — 97140 MANUAL THERAPY 1/> REGIONS: CPT

## 2022-12-08 RX ORDER — GABAPENTIN 300 MG/1
1200 CAPSULE ORAL 2 TIMES DAILY
Qty: 240 CAPSULE | Refills: 0 | Status: SHIPPED | OUTPATIENT
Start: 2022-12-08 | End: 2023-01-20

## 2022-12-08 ASSESSMENT — PAIN DESCRIPTION - DESCRIPTORS: DESCRIPTORS: SORE

## 2022-12-08 ASSESSMENT — PAIN DESCRIPTION - ORIENTATION: ORIENTATION: RIGHT

## 2022-12-08 ASSESSMENT — PAIN DESCRIPTION - LOCATION: LOCATION: SHOULDER

## 2022-12-08 ASSESSMENT — PAIN SCALES - GENERAL: PAINLEVEL_OUTOF10: 2

## 2022-12-08 ASSESSMENT — PAIN DESCRIPTION - PAIN TYPE: TYPE: SURGICAL PAIN

## 2022-12-08 ASSESSMENT — PAIN DESCRIPTION - FREQUENCY: FREQUENCY: CONTINUOUS

## 2022-12-08 NOTE — PROGRESS NOTES
Physical Therapy: Daily Note   Patient: Meaghan Acevedo (77 y.o. female)   Examination Date:   Plan of Care/Certification Expiration Date: 12/15/22    No data recorded   :  1975 # of Visits since [facility]:   12   MRN: [id number]  CSN: [id number] Start of Care Date:   10/10/2022   Insurance: Payor: [employer] / Plan: MEDICAL [facility] / Product Type: *No Product type* /   Insurance ID: [id number] - (Commercial) Secondary Insurance (if applicable):    Referring Physician: MD Dr Kimmie Ureña   PCP: Darci Colvin MD Visits to Date/Visits Approved: 15 /  (-)    No Show/Cancelled Appts:      Medical Diagnosis: Pathological fracture, unspecified humerus, initial encounter for fracture [J22.437E]    Treatment Diagnosis: s/p ORIF right humerus due to 2 pathological fxs (dx with lymphoma)        SUBJECTIVE EXAMINATION   Pain Level: Pain Screening  Patient Currently in Pain: Yes  Pain Assessment: 0-10  Pain Level: 2  Pain Type: Surgical pain  Pain Location: Shoulder  Pain Orientation: Right  Pain Descriptors: Sore  Pain Frequency: Continuous    Patient Comments: Subjective: Pt. states she is doing good today. Mild pain ant shd. HEP Compliance: Good        OBJECTIVE EXAMINATION   Restrictions:  No data recorded No data recorded No data recorded              TREATMENT     Exercises:      Treatment Reasoning    Exercise 4: pulleys flexion and abduction x 10 H5''  Exercise 5: wall slides RUE flexion with eccentric abduction   H5'' x 10  Exercise 6: wall slides RUE abduction with eccentric flexion x 10 H5''  Exercise 9: mid rows RTB x 12 H5 sec  Exercise 10: bilat shoulder ext x 12 RTB H5  Exercise 11: BUE ER x 20 hold 3-5 sec YTB                          Manual Therapy: (CPT 97995) Treatment Reasoning     Joint Mobilization: PROM with distraction R shoulder flexion, abduction and IR/ER.  gentle oscillation mobs GH for pain reduction and  to promote relaxation Pt Education: Additional Comments: *Pt dx with Lymphoma in Aug 2022 and is currently undergoing chemo and radiation* NO ESTIM OR US; Okay KT tape, CP, HP       ASSESSMENT     Assessment: Assessment: Pt. able to tolerates inc strengthening with no inc in pain. Easily fatigues however with occ RBs during session. No inc in pain this date. Pt. to ice at home. Body Structures, Functions, Activity Limitations Requiring Skilled Therapeutic Intervention: Decreased functional mobility , Decreased ROM, Decreased strength, Decreased endurance, Increased pain    Post-Treatment Pain Level: Activity Tolerance: Patient limited by endurance; Patient limited by pain;  Patient tolerated treatment well    Therapy Prognosis: Good       GOALS   Patient Goals : Be able to use her right arm overhead  Short Term Goals Completed by 1-2 weeks from date of evaluation Current Status Goal Status   Pt reports having 3/10 or less right shoulder pain with ADLS GOAL nearly met Pt reports 3-4/10 pain during ADLs up to 710 during P/AAROM occasionally In progress   Pt reports able to complete her HEP 3-5x per week pt at least doing HEP 4-5/week Met                                                                       Long Term Goals Completed by 4-6 weeks from date of recheck on 11/15/2022 Current Status Goal Status   Pt reports having 2/10 or less R UE pain while performing ADLS and work duties Pt reports 3-4/10 pain on ave during ADLs In progress   Increase AROM of right shoulder to dsqmsxg=908 degrees, abduction =140 degrees and IR= able to reach to her toleraance for improved function GOAL not yet met however ROM improving Recheck on 11/15/2022: Shoulder flexion= 130 Right shoudler abduction=   100 deg   IR= Functional IR thumb to L1-2 In progress   Increase RUE strength to 4+/5 or >so that pt can perform overhead activities without increased pain GOAL not yet met however strength slowly improving In progress   Improve her Spadi (Shoulder Pain & Disability Index) from 34% disability on eval to <10% by time of DC Spadi at recheck= 20% imprioved from 34% at time of IE In progress   Pt indep with an advanced HEP progressing HEP as tolerated In progress                                                TREATMENT PLAN   Plan Frequency: 1-2x per week  Plan weeks: 4-6 weeks   Requires PT Follow-Up: Yes  Additional Comments: *Pt dx with Lymphoma in Aug 2022 and is currently undergoing chemo and radiation* NO ESTIM OR US; Okkatelyn KT tape, CP, HP       Therapy Time  Individual Time In:   1100      Individual Time Out:  9405  Minutes:  45        Electronically signed by Halina Nam PTA  on 46/4/1234 at 4:08 PM   POC NOTE  Physical Therapy

## 2022-12-09 NOTE — PROGRESS NOTES
No additional comment forearms on lap with inhale and exhale x 2 20 sec   Exercise 5: cervical rotation x 5-10 reps hold 3-5 sec educated stay in pain free ROM some parasthesias RUE better with dec ROM   Exercise 7: doorway stretch verbally reviewed. Exercise 8: cap D x 10   Exercise 9: Cervical retraction with extension x 10'      Modalities  Ultrasound: US with increase to 1.5 cm2 20% duty cycle, 8 minutes R posterior shoulder (romboid region) to decrease tightness and promote healing. Manual therapy  Joint mobilization: PA mobs. to thoracic region and R scapular, Grade I-II distraction to decrease tightness and pain. Pt. tolerates gentle mobs. due to increase pain/flare up with increasing grade of mobs. Soft Tissue Mobalization: Soft tissue mobs/MFR to L scapular laterally and inferior mostly to reduce tightness and spasms post ther ex and manual PA mobs. to thoracic region. Other: KT 2 I strip on B posterior shoulders with 80% tension from anterior gleniod fossa towards mid thoracic region with shoulder retraction. Assessment:   Conditions Requiring Skilled Therapeutic Intervention  Body structures, Functions, Activity limitations: Decreased functional mobility ; Decreased endurance;Decreased ROM; Increased pain;Decreased strength  Assessment: Pt. appears to demo progress with decreasing pain/tension the last 2 days to ~ 2/10. Pt. still painful with muscle spasms with PAmobs. Continued with no change in HEP this date. Minimal vc's to correct form. Improve form with ther ex this date. Continued to focus on US with slight increase tensity to R posterior rombiod region to assist with healing and decreasing tension and pain. Pt. indicates she may be ready to slightly increase resistance next Rx session if appropriate to progress with strength for postural support.    Treatment Diagnosis: Cervical spondylosis without myelopathy, cervical radiculopathy and Right UE pain  Prognosis: Good  Decision Making: Medium Complexity  REQUIRES PT FOLLOW UP: Yes      G-Code:     OutComes Score                                                     Goals:  Short term goals  Time Frame for Short term goals: 1-2 weeks  Short term goal 1: Pt reports able to do 3x per week  Short term goal 2: Pt reports a decrease in NT down R UE by 25% or bettter  Long term goals  Time Frame for Long term goals : 4-6 weeks  Long term goal 1: Pt reports neck and right shoulder pain 2/10 or less with ADLs  Long term goal 2: Pt reports a decrease in NT down R UE by 75% or better  Long term goal 3: Demonstrates full cervical and R UE AROM with no reports of pain or NT down her arm  Long term goal 4: Increase neck and B UE strength to 4+/5 or > so that pt can lift overhead without pain  Long term goal 5: Pt indep with HEP  Patient Goals   Patient goals :  To get rid of her neck and shoulder pain with NT down her R UE    Plan:      Times per week: 2-3  Plan weeks: 4-6  Current Treatment Recommendations: Strengthening, Home Exercise Program, ROM, Manual Therapy - Soft Tissue Mobilization, Safety Education & Training, Patient/Caregiver Education & Training, Manual Therapy - Joint Manipulation, Modalities, Endurance Training  Plan Comment: Okay for estim, US, KT tape, CP/HP        Therapy Time   Individual Concurrent Group Co-treatment   Time In  200         Time Out  300         Minutes  [address], PTA  License and [address] [address] Number: [phone]

## 2022-12-13 ENCOUNTER — HOSPITAL ENCOUNTER (OUTPATIENT)
Dept: PHYSICAL THERAPY | Age: 47
Setting detail: THERAPIES SERIES
Discharge: HOME | End: 2022-12-13
Payer: COMMERCIAL

## 2022-12-13 PROCEDURE — 97110 THERAPEUTIC EXERCISES: CPT

## 2022-12-13 PROCEDURE — 97140 MANUAL THERAPY 1/> REGIONS: CPT

## 2022-12-13 PROCEDURE — 97530 THERAPEUTIC ACTIVITIES: CPT

## 2022-12-13 ASSESSMENT — PAIN DESCRIPTION - ORIENTATION
ORIENTATION: RIGHT
ORIENTATION: RIGHT

## 2022-12-13 ASSESSMENT — PAIN DESCRIPTION - LOCATION
LOCATION: SHOULDER
LOCATION: SHOULDER

## 2022-12-13 ASSESSMENT — PAIN DESCRIPTION - DESCRIPTORS: DESCRIPTORS: ACHING

## 2022-12-13 ASSESSMENT — PAIN DESCRIPTION - FREQUENCY: FREQUENCY: CONTINUOUS

## 2022-12-13 NOTE — PROGRESS NOTES
Physical Therapy: Monthly Recheck   Patient: Milka Romo (89 y.o. female)   Examination Date: 20/10/6750  Plan of Care/Certification Expiration Date: 23    No data recorded   :  1975 # of Visits since [facility]:   15   MRN: [id number]  CSN: [id number] Start of Care Date:   6/15/2022   Insurance: Payor: [employer] / Plan: [employer] [facility] / Product Type: *No Product type* /   Insurance ID: [id number] - (Commercial) Secondary Insurance (if applicable):    Referring Physician: Fatimah Bess MD     PCP: Nic Singleton MD Visits to Date/Visits Approved:       No Show/Cancelled Appts:      Medical Diagnosis: Pathological fracture, unspecified humerus, initial encounter for fracture [N40.822W] s/p ORIF Right humerus due to pathological fx (recently dx with lymphoma)  Treatment Diagnosis: s/p ORIF right humerus due to 2 pathological fxs (dx with lymphoma)        SUBJECTIVE EXAMINATION   Pain Level: Pain Screening  Pain Assessment: 0-10  Pain Level:  (2.5)  Pain Location: Shoulder  Pain Orientation: Right  Pain Descriptors: Aching  Pain Frequency: Continuous    Patient Comments: Subjective: Pt reports last chemo session scheduled this Friday. Pt presents with 2/5/10 pain today, ave pain 3/10 during ADLs. Pt reports notcing increased strengtth and mobility R shoulder. Appt with ortho last week, bone healing well per MD per pt, one area not completely healed per pt. No restrictions per MD per pt. HEP Compliance: Good        OBJECTIVE EXAMINATION   Restrictions:  No data recorded No data recorded No data recorded      Left AROM  Right AROM       WFL AROM RUE (degrees)  R Shoulder Flexion (0-180): standing 135 deg flex in standing (after stretching), supine flex 148 deg with ERP  R Shoulder Extension (0-45): 50 deg in standing  R Shoulder ABduction (0-180): 125 deg standing R abd, supine 125 deg  R Shoulder Int Rotation  (0-70):  Functional IR thumb to L1-2 with ERP  R Shoulder Ext Rotation (0-90): ER 85 deg at 90 deg abd        Right Strength              Strength RUE  R Shoulder Flexion: 4-/5, 4/5 (within available range)  R Shoulder Extension: 4/5  R Shoulder ABduction: 4-/5, 3+/5 (within available range)  R Shoulder Internal Rotation: 4/5 (within available range)  R Shoulder External Rotation: 4-/5  R Elbow Flexion: 4/5, 4-/5  R Elbow Extension: 4/5, 4-/5       TREATMENT     Exercises:      Treatment Reasoning    Exercise 1: shoulder internal rotation stretch with pillow case 5-10 sec  sec x 5  Exercise 5: wall slides RUE flexion with eccentric abduction   H5'' x 10  Exercise 6: wall slides RUE abduction with eccentric flexion x 5 H5''  Exercise 9: mid rows RTB x 12 H5 sec  Exercise 10: bilat shoulder ext x 10 RTB H5  Exercise 11: BUE ER x 15 hold 3-5 sec YTB     To improve ROM and strength                     Manual Therapy: (CPT 49693) Treatment Reasoning     Joint Mobilization: PROM with distraction R shoulder flexion, abduction and IR/ER. gentle oscillation mobs GH for pain reduction and  to promote relaxation  To improve ROM and decrease pain                    Pt Education: PT Education: Home Exercise Program  Patient Education: Reviewed HEP and importance  of compliance with HEP, along with increased use of R UE as tolerated and modification of HEP as needed . Also encouraged cont use of CP PRN for pain reduction. Additional Comments: *Pt dx with Lymphoma in Aug 2022 and is currently undergoing chemo and radiation* NO ESTIM OR US; Okay KT tape, CP, HP       ASSESSMENT     Assessment: Assessment: 56year old female who has been receiving PT treatment S/P ORIF R humerus due to pathalogocial fx (lymphoma) presents for monthly recheck. Pt reports pain intensity slowly decreasing along with noticing improved mobility and strength. Pt cont to experience stiffness R shoulder and weakness, unable to lift much overhead or out to the side due to weakness and pain.  Pt has one more scheduled chemotherapy session this Friday. Pt reports increased fatigue and weakness along with sometimes increase in shoulder pain following chemo sessions. STGs achieved. Pt is making steady progress toward achieval of LTGs. Recommend cont PT 1x per wk x 4 weeks to further improve ROM, strength and overall functional mobility and advance HEP as tolerated. Body Structures, Functions, Activity Limitations Requiring Skilled Therapeutic Intervention: Decreased functional mobility , Decreased ROM, Decreased strength, Decreased endurance, Increased pain         Activity Tolerance: Patient limited by endurance; Patient limited by pain;  Patient tolerated treatment well    Therapy Prognosis: Good       GOALS   Patient Goals : Be able to use her right arm overhead  Short Term Goals Completed by 1-2 weeks from date of evaluation Current Status Goal Status   Pt reports having 3/10 or less right shoulder pain with ADLS GOAL met, ave pain 3/10 Met   Pt reports able to complete her HEP 3-5x per week GOAL met, pt has been performing some exs near daily with exception of chemo weeks-less frequently Met                                                                       Long Term Goals Completed by 4-6 weeks from date of recheck on 12/13/2022 Current Status Goal Status   Pt reports having 2/10 or less R UE pain while performing ADLS and work duties GOAL nearly met- Pt reports 3/10 pain on ave during ADLs  Nearly Met   Increase AROM of right shoulder to zyzruuv=690 degrees, abduction =140 degrees and IR= able to reach to her toleraance for improved function GOAL not yet met however ROM improving; Recheck on 12/13/2022: Shoulder flexion= 135 deg Right shoudler abduction=   125 deg   IR= Functional IR thumb to L1-2 In progress   Increase RUE strength to 4+/5 or >so that pt can perform overhead activities without increased pain GOAL not yet met however strength slowly improving In progress   Improve her Spadi (Shoulder Pain & Disability Index) from 34% disability on eval to <10% by time of DC Spadi at recheck= 15% imprioved from 34% at time of IE, 20%at last recheck In progress   Pt indep with an advanced HEP progressing HEP as tolerated In progress                                                TREATMENT PLAN   Plan Frequency: 1  Plan weeks: 4   Requires PT Follow-Up: Yes  Additional Comments: *Pt dx with Lymphoma in Aug 2022 and is currently undergoing chemo and radiation* NO ESTIM OR US; Okay KT tape, CP, HP       Therapy Time  Individual Time In:    1035     Individual Time Out:  [address]  Minutes:  50        Electronically signed by Ana High PT  on 12/13/2022 at 1:32 PM   POC NOTE

## 2022-12-15 ENCOUNTER — HOSPITAL ENCOUNTER (OUTPATIENT)
Dept: PHYSICAL THERAPY | Age: 47
Setting detail: THERAPIES SERIES
Discharge: HOME | End: 2022-12-15
Payer: COMMERCIAL

## 2022-12-15 PROCEDURE — 97110 THERAPEUTIC EXERCISES: CPT

## 2022-12-15 PROCEDURE — 97140 MANUAL THERAPY 1/> REGIONS: CPT

## 2022-12-15 NOTE — PROGRESS NOTES
Physical Therapy: Daily Note   Patient: Brooke Brenner (21 y.o. female)   Examination Date:   Plan of Care/Certification Expiration Date: 23    No data recorded   :  1975 # of Visits since [facility]:   13   MRN: [id number]  CSN: [id number] Start of Care Date:   10/10/2022   Insurance: Payor: [employer] / Plan: MEDICAL [facility] / Product Type: *No Product type* /   Insurance ID: [id number] - (Commercial) Secondary Insurance (if applicable):    Referring Physician: Larissa Kidd MD     PCP: Cristian Ch MD Visits to Date/Visits Approved:  Show/Cancelled Appts:      Medical Diagnosis: Pathological fracture, unspecified humerus, initial encounter for fracture [L70.921C]    Treatment Diagnosis: s/p ORIF right humerus due to 2 pathological fxs (dx with lymphoma)        SUBJECTIVE EXAMINATION   Pain Level: Pain Screening  Patient Currently in Pain: Yes  Pain Assessment: 0-10    Patient Comments: Subjective: Pt reports pain 2.5/10. \"I slept on it good\". HEP Compliance: Good        OBJECTIVE EXAMINATION   Restrictions:  No data recorded No data recorded No data recorded      Left AROM  Right AROM        AROM RUE (degrees)  R Shoulder Flexion (0-180): supine flex 153 deg with ERP  R Shoulder ABduction (0-180): supine 141 deg           TREATMENT     Exercises:      Treatment Reasoning    Exercise 4: pulleys flexion and abduction x 10 H5''  Exercise 5: wall slides RUE flexion with eccentric abduction   H5'' x 10  Exercise 6: wall slides RUE abduction with eccentric flexion x 10 H5''  Exercise 9: mid rows RTB x 12-15 H5 sec  Exercise 10: bilat shoulder ext x 15 RTB H5  Exercise 11: BUE ER x 20 hold 3-5 sec YTB                          Manual Therapy: (CPT 32261) Treatment Reasoning     Joint Mobilization: PROM with distraction R shoulder flexion, abduction and IR/ER.  gentle oscillation mobs GH for pain reduction and  to promote relaxation guarded this date with pain up to 4-5/10 better with distraction less pain  Other: KT tape applied to right post shoulder at diagonal anchored distal pulled at 35-40% in a inferior medial direction for posture and postioning of the right scapula; A second piece with placed just inferior to first with same pull                      Pt Education: Additional Comments: *Pt dx with Lymphoma in Aug 2022 and is currently undergoing chemo and radiation* NO ESTIM OR US; Okay KT tape, CP, HP       ASSESSMENT     Assessment: Assessment:Pt progressingly slowly with strenghtening tolerating some inc in reps but fearful of flaring her scapular pain and reports inc fatigue around chemo tx's and thus progressing slowly. Donned KT tape for postural support and dec scap winging followed by PROM to RUE and scap mobs for inc ROM. Pt progressing with AROM post. Pain level 2/10. Body Structures, Functions, Activity Limitations Requiring Skilled Therapeutic Intervention: Decreased functional mobility , Decreased ROM, Decreased strength, Decreased endurance, Increased pain    Post-Treatment Pain Level: Activity Tolerance: Patient limited by endurance; Patient limited by pain;  Patient tolerated treatment well    Therapy Prognosis: Good       GOALS   Patient Goals : Be able to use her right arm overhead  Short Term Goals Completed by 1-2 weeks from date of evaluation Current Status Goal Status   Pt reports having 3/10 or less right shoulder pain with ADLS GOAL met, ave pain 3/10 Met   Pt reports able to complete her HEP 3-5x per week GOAL met, pt has been performing some exs near daily with exception of chemo weeks-less frequently Met                                                                       Long Term Goals Completed by 4-6 weeks from date of recheck on 12/13/2022 Current Status Goal Status   Pt reports having 2/10 or less R UE pain while performing ADLS and work duties GOAL nearly met- Pt reports 3/10 pain on ave during ADLs     Increase AROM of right shoulder to czwrjuv=504 degrees, abduction =140 degrees and IR= able to reach to her toleraance for improved function GOAL not yet met however ROM improving Recheck on 11/15/2022: Shoulder flexion= 130 Right shoudler abduction=   100 deg   IR= Functional IR thumb to L1-2                                                                          TREATMENT PLAN   Plan Frequency: 1-2x per week  Plan weeks: 4-6 weeks   Additional Comments: *Pt dx with Lymphoma in Aug 2022 and is currently undergoing chemo and radiation* NO ESTIM OR US; Anna KT tape, CP, HP       Therapy Time  Individual Time In:       4906  Individual Time Out:  1100  Minutes:  35        Electronically signed by Elijah Hall PTA  on 12/15/2022 at 11:56 AM   POC NOTE

## 2022-12-21 ENCOUNTER — HOSPITAL ENCOUNTER (OUTPATIENT)
Dept: PHYSICAL THERAPY | Age: 47
Setting detail: THERAPIES SERIES
Discharge: HOME | End: 2022-12-21
Payer: COMMERCIAL

## 2022-12-21 PROCEDURE — 97110 THERAPEUTIC EXERCISES: CPT

## 2022-12-21 PROCEDURE — 97140 MANUAL THERAPY 1/> REGIONS: CPT

## 2022-12-21 ASSESSMENT — PAIN DESCRIPTION - PAIN TYPE: TYPE: SURGICAL PAIN

## 2022-12-21 ASSESSMENT — PAIN DESCRIPTION - DESCRIPTORS: DESCRIPTORS: PRESSURE

## 2022-12-21 ASSESSMENT — PAIN DESCRIPTION - FREQUENCY: FREQUENCY: CONTINUOUS

## 2022-12-21 ASSESSMENT — PAIN DESCRIPTION - LOCATION: LOCATION: SHOULDER

## 2022-12-21 ASSESSMENT — PAIN DESCRIPTION - ORIENTATION: ORIENTATION: RIGHT

## 2022-12-21 NOTE — PROGRESS NOTES
Physical Therapy: Daily Note   Patient: Angelic Mauricio (84 y.o. female)   Examination Date:   Plan of Care/Certification Expiration Date: 23    No data recorded   :  1975 # of Visits since [facility]:   14   MRN: [id number]  CSN: [id number] Start of Care Date:   10/10/2022   Insurance: Payor: [employer] / Plan: [employer]ICAL [employer] [facility] / Product Type: *No Product type* /   Insurance ID: [id number] - (Commercial) Secondary Insurance (if applicable):    Referring Physician: MD Dr Larry Sy   PCP: Fallon Hubbard MD Visits to Date/Visits Approved:   ()    No Show/Cancelled Appts:      Medical Diagnosis: Pathological fracture, unspecified humerus, initial encounter for fracture [T38.240P]    Treatment Diagnosis: s/p ORIF right humerus due to 2 pathological fxs (dx with lymphoma)        SUBJECTIVE EXAMINATION   Pain Level: Pain Screening  Patient Currently in Pain: Yes  Pain Assessment: 0-10  Pain Level:  (2.5/10)  Pain Type: Surgical pain  Pain Location: Shoulder  Pain Orientation: Right  Pain Radiating Towards: R shd, chest swollen  Pain Descriptors: Pressure  Pain Frequency: Continuous    Patient Comments: Subjective: Pt. states she is exhausted. Pt. reports she is having a head time as last chemo and heart medication inc pressure and swelling in face and chest.  Pain in arm 2.5/10. Pt. reports on Saturday able to do inc reps therex.     HEP Compliance: Good        OBJECTIVE EXAMINATION   Restrictions:  No data recorded No data recorded No data recorded      Left AROM  Right AROM        AROM RUE (degrees)  R Shoulder Flexion (0-180): supine 157 deg  R Shoulder ABduction (0-180): supine 139 deg  R Shoulder Int Rotation  (0-70): IR 52 deg as 90 deg abduction  R Shoulder Ext Rotation (0-90): ER 90 deg at 90 deg abd           TREATMENT     Exercises:      Treatment Reasoning    Exercise 4: pulleys flexion and abduction x 10 H5''  Exercise 5: wall slides RUE flexion with eccentric abduction   H5'' x 10  Exercise 6: wall slides RUE abduction with eccentric flexion x 10 H5''                          Manual Therapy: (CPT 95243) Treatment Reasoning     Joint Mobilization: PROM with distraction R shoulder flexion, abduction, ER, and IR with oscillations and distraction intermittently for pain reduction and to promote relaxation. Suboccipital release to dec pain and tightness,  Scap mobs focus on upward rotation as is very tight. Pt Education: Additional Comments: *Pt dx with Lymphoma in Aug 2022 and is currently undergoing chemo and radiation* NO ESTIM OR US; Okay KT tape, CP, HP       ASSESSMENT     Assessment: Assessment: Deffered strengthening this date. Pt. extremely fatigued and weak post chemo. Pt. reports last chemo and usually week after. Pt. having difficulty with ADL's. Deffered strenghtening this date due to fatigue level. Pt. continues to progress with AROM but limited still with Abduction. Performed scap mobs focus on upward rotation to inc abduction of R shd. Body Structures, Functions, Activity Limitations Requiring Skilled Therapeutic Intervention: Decreased functional mobility , Decreased ROM, Decreased strength, Decreased endurance, Increased pain    Post-Treatment Pain Level: Activity Tolerance: Patient limited by fatigue; Patient limited by endurance;  Patient limited by pain    Therapy Prognosis: Good       GOALS   Patient Goals : Be able to use her right arm overhead  Short Term Goals Completed by 1-2 weeks from date of evaluation Current Status Goal Status   Pt reports having 3/10 or less right shoulder pain with ADLS GOAL met, ave pain 3/10 Met   Pt reports able to complete her HEP 3-5x per week GOAL met, pt has been performing some exs near daily with exception of chemo weeks-less frequently Met                                                                       Long Term Goals Completed by 4-6 weeks from date of recheck on 12/13/2022 Current Status Goal Status   Pt reports having 2/10 or less R UE pain while performing ADLS and work duties GOAL nearly met- Pt reports 3/10 pain on ave during ADLs     Increase AROM of right shoulder to vjlynoo=109 degrees, abduction =140 degrees and IR= able to reach to her toleraance for improved function GOAL not yet met however ROM improving Recheck on 11/15/2022: Shoulder flexion= 130 Right shoudler abduction=   100 deg   IR= Functional IR thumb to L1-2                                                                          TREATMENT PLAN   Plan Frequency: 1-2x per week  Plan weeks: 4-6 weeks   Requires PT Follow-Up: Yes  Additional Comments: *Pt dx with Lymphoma in Aug 2022 and is currently undergoing chemo and radiation* NO ESTIM OR US; Anna KT tape, CP, HP       Therapy Time  Individual Time In:    1245     Individual Time Out:  130  Minutes:  45        Electronically signed by Samantha Duarte PTA  on 08/94/3777 at 2:23 PM   POC NOTE  Physical Therapy

## 2022-12-28 ENCOUNTER — HOSPITAL ENCOUNTER (OUTPATIENT)
Dept: PHYSICAL THERAPY | Age: 47
Setting detail: THERAPIES SERIES
Discharge: HOME | End: 2022-12-28
Payer: COMMERCIAL

## 2022-12-28 PROCEDURE — 97140 MANUAL THERAPY 1/> REGIONS: CPT

## 2022-12-28 PROCEDURE — 97110 THERAPEUTIC EXERCISES: CPT

## 2022-12-28 NOTE — PROGRESS NOTES
Physical Therapy: Daily Note   Patient: Dickson Canavan (3 y.o. female)   Examination Date:   Plan of Care/Certification Expiration Date: 23    No data recorded   :  1975 # of Visits since [facility]:   15   MRN: [id number]  CSN: [id number] Start of Care Date:   10/10/2022   Insurance: Payor: [employer] / Plan: [address] [place] [address] / Product Type: *No Product type* /   Insurance ID: [id number] - (Commercial) Secondary Insurance (if applicable):    Referring Physician: MD Dr Danielle Marcano   PCP: Kimo Lao MD Visits to Date/Visits Approved:   ()    No Show/Cancelled Appts:      Medical Diagnosis: Pathological fracture, unspecified humerus, initial encounter for fracture [Q82.590R]    Treatment Diagnosis: s/p ORIF right humerus due to 2 pathological fxs (dx with lymphoma)        SUBJECTIVE EXAMINATION   Pain Level: Pain Screening  Patient Currently in Pain: No  Pain Assessment: 0-10    Patient Comments: Subjective: Pt. states she is now starting to get her energy back and was able to do 20 reps of Red TBand without pain    HEP Compliance: Good        OBJECTIVE EXAMINATION   Restrictions:  No data recorded No data recorded No data recorded              TREATMENT     Exercises:      Treatment Reasoning    Exercise 4: pulleys flexion and abduction x 10 H5''  Exercise 5: wall slides RUE flexion with eccentric abduction   H5'' x 10  Exercise 6: wall slides RUE abduction with eccentric flexion x 10 H5''  Exercise 9: mid rows RTB x 20 H5 sec  Exercise 10: bilat shoulder ext x 20 RTB H5  Exercise 11: BUE ER x 20 hold 3-5 sec YTB                          Manual Therapy: (CPT 88926) Treatment Reasoning     Joint Mobilization: PROM with distraction R shoulder flexion, abduction, ER, and IR with oscillations and distraction intermittently for pain reduction and to promote relaxation.   Suboccipital release to dec pain and tightness,  Scap mobs focus on upward rotation Pt Education: Additional Comments: *Pt dx with Lymphoma in Aug 2022 and is currently undergoing chemo and radiation* NO ESTIM OR US; Okay KT tape, CP, HP       ASSESSMENT     Assessment: Assessment: Improved tolerance and activity level this date. Able to progress with 20 reps of Tband therex with RTB,  YTB for B shd ER. No inc in pain this date. Still some ant shd pain with PROM flexion better with distraction and oscillations. Overall progressing well. Issued GTB to inc strength as tolerates. Body Structures, Functions, Activity Limitations Requiring Skilled Therapeutic Intervention: Decreased functional mobility , Decreased ROM, Decreased strength, Decreased endurance, Increased pain    Post-Treatment Pain Level:       Activity Tolerance: Patient tolerated treatment well    Therapy Prognosis: Good       GOALS   Patient Goals : Be able to use her right arm overhead  Short Term Goals Completed by 1-2 weeks from date of evaluation Current Status Goal Status   Pt reports having 3/10 or less right shoulder pain with ADLS GOAL met, ave pain 3/10 Met   Pt reports able to complete her HEP 3-5x per week GOAL met, pt has been performing some exs near daily with exception of chemo weeks-less frequently Met                                                                       Long Term Goals Completed by 4-6 weeks from date of recheck on 12/13/2022 Current Status Goal Status   Pt reports having 2/10 or less R UE pain while performing ADLS and work duties GOAL nearly met- Pt reports 3/10 pain on ave during ADLs     Increase AROM of right shoulder to bjipgpj=287 degrees, abduction =140 degrees and IR= able to reach to her toleraance for improved function GOAL not yet met however ROM improving Recheck on 11/15/2022: Shoulder flexion= 130 Right shoudler abduction=   100 deg   IR= Functional IR thumb to L1-2 In progress   Increase RUE strength to 4+/5 or >so that pt can perform overhead activities without increased pain GOAL not yet met however strength slowly improving In progress   Improve her Spadi (Shoulder Pain & Disability Index) from 34% disability on eval to <10% by time of DC Spadi at recheck= 15% imprioved from 34% at time of IE, 20%at last recheck In progress   Pt indep with an advanced HEP progressing HEP as tolerated In progress                                                TREATMENT PLAN   Plan Frequency: 1-2x per week  Plan weeks: 4-6 weeks   Requires PT Follow-Up: Yes  Additional Comments: *Pt dx with Lymphoma in Aug 2022 and is currently undergoing chemo and radiation* NO ESTIM OR US; Anna KT tape, CP, HP       Therapy Time  Individual Time In:       130  Individual Time Out:  215  Minutes:  45        Electronically signed by Licha Gomez PTA  on 26/87/4557 at 4:53 PM   POC NOTE  Physical Therapy

## 2022-12-29 DIAGNOSIS — F07.81 POST-CONCUSSION SYNDROME: ICD-10-CM

## 2022-12-29 NOTE — TELEPHONE ENCOUNTER
Pharmacy is requesting medication refill.  Please approve or deny this request.    Rx requested:  Requested Prescriptions     Pending Prescriptions Disp Refills    methylphenidate (RITALIN) 10 MG tablet [Pharmacy Med Name: methylphenidate 10 mg tablet] 60 tablet 0     Sig: TAKE 1 TABLET BY MOUTH EVERY MORNING and TAKE 1 TABLET at 2pm         Last Office Visit:   7/19/2022      Next Visit Date:  Future Appointments   Date Time Provider Marleny Ta   1/5/2023 10:30 AM [facility], [address] [place] [address]   1/17/2023  2:45 PM Dayna Iraheta MD [address] Neurology -   2/28/2023  1:30 PM Rosemary Curtis MD [address] [place] [address]

## 2022-12-30 RX ORDER — METHYLPHENIDATE HYDROCHLORIDE 10 MG/1
TABLET ORAL
Qty: 60 TABLET | Refills: 0 | Status: SHIPPED | OUTPATIENT
Start: 2022-12-30 | End: 2023-01-29

## 2023-01-05 ENCOUNTER — HOSPITAL ENCOUNTER (OUTPATIENT)
Dept: PHYSICAL THERAPY | Age: 48
Setting detail: THERAPIES SERIES
Discharge: HOME | End: 2023-01-05
Payer: COMMERCIAL

## 2023-01-05 PROCEDURE — 97110 THERAPEUTIC EXERCISES: CPT

## 2023-01-05 ASSESSMENT — PAIN SCALES - GENERAL: PAINLEVEL_OUTOF10: 0

## 2023-01-05 NOTE — PROGRESS NOTES
Physical Therapy: Reassessment, treatment   Patient: Case Douglas (44 y.o. female)   Examination Date:   Plan of Care Certification Period: 2023 to 23      :  1975 ;    Confirmed: Yes MRN: [id number]  CSN: [id number]   Insurance: Payor: [employer] / Plan: MEDICAL [facility] / Product Type: *No Product type* /   Insurance ID: [id number] - (Commercial) Secondary Insurance (if applicable):    Referring Physician: MD Dr Viktor Castellanos   PCP: Binh Diego MD Visits to Date/Visits Approved:  /      No Show/Cancelled Appts:      Medical Diagnosis: Pathological fracture, unspecified humerus, initial encounter for fracture [T08.983L]    Treatment Diagnosis: s/p ORIF right humerus due to 2 pathological fxs (dx with lymphoma)     PERTINENT MEDICAL HISTORY           Medical History:     Past Medical History:   Diagnosis Date    Diffuse large B cell lymphoma ([facility]     HLD (hyperlipidemia)     Migraine with aura and without status migrainosus, not intractable     Type 2 diabetes mellitus ([facility] 2022     Surgical History:   Past Surgical History:   Procedure Laterality Date    HAND SURGERY Right     Middle finger trigger finger release    IR TUNNELED CATHETER PLACEMENT GREATER THAN 5 YEARS  2022    IR TUNNELED CATHETER PLACEMENT GREATER THAN 5 YEARS    TONSILLECTOMY         Medications:   Current Outpatient Medications:     methylphenidate (RITALIN) 10 MG tablet, TAKE 1 TABLET BY MOUTH EVERY MORNING and TAKE 1 TABLET at 2pm, Disp: 60 tablet, Rfl: 0    gabapentin (NEURONTIN) 300 MG capsule, Take 4 capsules by mouth 2 times daily for 30 days. , Disp: 240 capsule, Rfl: 0    blood glucose monitor kit and supplies, Use as directed for type 2 diabetes. Please cover what is under insurance., Disp: 1 kit, Rfl: 0    blood glucose monitor strips, Test 2 times a day & as needed for symptoms of irregular blood glucose.  Dispense sufficient amount for indicated testing frequency plus additional to accommodate PRN testing needs. Use as directed for type 2 diabetes. Please cover what is under insurance., Disp: 100 strip, Rfl: 0    Lancets MISC, 1 each by Does not apply route 2 times daily, Disp: 100 each, Rfl: 1    etonogestrel-ethinyl estradiol (NUVARING) 0.12-0.015 MG/24HR vaginal ring, Vaginal: One ring, inserted vaginally and left in place continuously for 3 consecutive weeks, then removed for 1 week. A new ring is inserted 7 days after the last was removed, Disp: 3 each, Rfl: 3    pegfilgrastim (NEULASTA) 6 MG/0.6ML injection, Inject 6 mg into the skin once, Disp: , Rfl:     allopurinol (ZYLOPRIM) 300 MG tablet, Take 300 mg by mouth daily, Disp: , Rfl:     predniSONE (DELTASONE) 20 MG tablet, Take 20 mg by mouth daily, Disp: , Rfl:     ondansetron (ZOFRAN ODT) 4 MG disintegrating tablet, Take 1 tablet by mouth every 8 hours as needed for Nausea or Vomiting, Disp: 14 tablet, Rfl: 0    Na Sulfate-K Sulfate-Mg Sulf (SUPREP) 17.5-3.13-1.6 GM/177ML SOLN solution, As directed, Disp: 1 each, Rfl: 0    busPIRone (BUSPAR) 5 MG tablet, Take 1 tablet by mouth 2 times daily, Disp: 60 tablet, Rfl: 2    dicyclomine (BENTYL) 10 MG capsule, Take 1 capsule by mouth 3 times daily as needed (abdominal pain), Disp: 90 capsule, Rfl: 1    pantoprazole (PROTONIX) 20 MG tablet, Take 2 tablets by mouth daily, Disp: 60 tablet, Rfl: 0    ondansetron (ZOFRAN ODT) 4 MG disintegrating tablet, Take 1 tablet by mouth every 8 hours as needed for Nausea, Disp: 20 tablet, Rfl: 0    cyclobenzaprine (FLEXERIL) 10 MG tablet, Take 10 mg by mouth 2 times daily as needed for Muscle spasms , Disp: , Rfl:   Allergies: Nickel, Other, Oxycodone-acetaminophen, Neosporin  [bacitracin-polymyxin b], Vitamin b12, Azithromycin, and Vitamin b complex  [b complex-b12]      SUBJECTIVE EXAMINATION      ,           Subjective History: Onset Date: 08/15/22  Subjective: Pt reports she isn' really having pain anymore.  Feels she is getting stronger, increasing resistance wtih her exercises. biggest complaint is continued tightness in front of shoulder with internal rotation mvoements. states she has good reach, but still has difficulty lifitng anything heavy above chest level. Additional Pertinent Hx (if applicable):            Pain Screening   Pain Screening  Patient Currently in Pain: No  Pain Assessment: 0-10  Pain Level: 0         OBJECTIVE EXAMINATION      Left AROM  Right AROM              AROM RUE (degrees)  R Shoulder Flexion (0-180): 0-166 in supine  R Shoulder Extension (0-45): 45 deg in standing  R Shoulder ABduction (0-180): 146 supine  R Shoulder Int Rotation  (0-70): 65 deg at 90 deg abd; functionally rotates to T11 on R (vs. T7 L)  R Shoulder Ext Rotation (0-90): 90 deg at 90 deg abd       Left Strength  Right Strength              Strength RUE  R Shoulder Flexion: 4+/5  R Shoulder Extension: 4/5  R Shoulder ABduction: 4/5 (fatigue and mild pain)  R Shoulder Internal Rotation: 4+/5  R Shoulder External Rotation: 4+/5  R Elbow Flexion: 4+/5  R Elbow Extension: 4+/5     Special Tests:   Other Special Tests:  (SPADI: 14/130= 11% impairment)       ASSESSMENT     Impression: Assessment: Pt is a 55 yo female with h/o R shoulder pain wtih pathologic fx due to cancer, hash completed 19 PT sessions with focus on ROM, pain control and strengthening. Pt showing good gains in ROM since last recheck, close to functional normal range, though IR remains most limited. Strength also improving, but weakness persists corrine with lifting heavier loads or at chest or head level. Pt has been highly motivated and compliant with HEP. Feel pt will benfeitii from 1 additoinal month with focus on progressing strengthening in order to improve function at home.     Body Structures, Functions, Activity Limitations Requiring Skilled Therapeutic Intervention: Decreased functional mobility , Decreased ROM, Decreased strength, Decreased endurance, Increased pain    Statement of Medical Necessity: Physical Therapy is both indicated and medically necessary as outlined in the POC to increase the likelihood of meeting the functionally related goals stated below.      Patient's Activity Tolerance: Patient limited by fatigue occ pain with certain positions, but does not persist once at rest    Patient's rehabilitation potential/prognosis is considered to be: good    Factors which may impact rehabilitation potential include:  cancer        GOALS   Patient Goal(s): Be able to use her right arm overhead  Short Term Goals Completed by 1-2 weeks from date of evaluation Goal Status   Pt reports having 3/10 or less right shoulder pain with ADLS Met   Pt reports able to complete her HEP 3-5x per week Met                                                       Long Term Goals Completed by revised to 4 weeks from date of recheck 1/5/23 Goal Status   Pt reports having 2/10 or less R UE pain while performing ADLS and work duties Met   Increase AROM of right shoulder to hfkspeu=280 degrees, abduction =140 degrees and IR= able to reach to her toleraance for improved function Not Met   Increase RUE strength to 4+/5 or >so that pt can perform overhead activities without increased pain In progress   Improve her Spadi (Shoulder Pain & Disability Index) from 34% disability on eval to <10% by time of DC Met, New   Pt indep with an advanced HEP In progress                                      TREATMENT PLAN            Pt. actively involved in establishing Plan of Care and Goals: Yes  Patient/ Caregiver education and instruction: Home Exercise Program    *Pt dx with Lymphoma in Aug 2022 and is currently undergoing chemo and radiation* NO ESTIM OR US; Okkatelyn KT tape, CP, HP  Current home exercise program (HEP): Updated: Wall posture W's, standing scapation and lateral raises, sidelying shoulder external rotation and supine chest press; all using 1# wts, issued handout     Treatment may include any combination of the following: Current Treatment Recommendations: Strengthening, Functional mobility training, ROM, ADL/Self-care training, Endurance training, Manual, Neuromuscular re-education, Home exercise program, Modalities, Therapeutic activities     Frequency / Duration:  Patient to be seen 1x/wk for 4 additional weeks from recheck 1/5/23 weeks           PT Treatment Completed:  Exercises:  Therapeutic exercise (CPT 23903)   Treatment Reasoning    Exercise 1: \"don't shoot\" exercise at wall, 5x  Exercise 2: RUE pendulums x 30 sec to promote relaxation between exercises.   Exercise 3: prone R shoulder ext (attemptd 1# wt, decreased to none) x 10, reports fatigue/shaking  Exercise 4: prone scapular rows, bent elbow, 2# x 10 RUE  Exercise 5: standing bilateral UE scapation, 1 wt x 10  Exercise 6: standing lateral raises, 1# wt x 10 bilaterally  Exercise 7: sidelying shoulder external rotation at 90 deg, 1# wt x 10 RUE                          Therapy Time  Individual Time In: 1033       Individual Time Out: 1118  Minutes: 45        Therapist Signature: Virgen Wlaker PT    Date: 1/5/2023

## 2023-01-12 ENCOUNTER — HOSPITAL ENCOUNTER (OUTPATIENT)
Dept: PHYSICAL THERAPY | Age: 48
Setting detail: THERAPIES SERIES
Discharge: HOME | End: 2023-01-12
Payer: COMMERCIAL

## 2023-01-12 PROBLEM — J45.909 UNSPECIFIED ASTHMA, UNCOMPLICATED: Status: ACTIVE | Noted: 2022-08-15

## 2023-01-12 PROBLEM — R16.0 HEPATOMEGALY, NOT ELSEWHERE CLASSIFIED: Status: ACTIVE | Noted: 2022-08-29

## 2023-01-12 PROBLEM — C85.10: Status: ACTIVE | Noted: 2022-08-11

## 2023-01-12 PROBLEM — Z20.822 CONTACT WITH AND (SUSPECTED) EXPOSURE TO COVID-19: Status: ACTIVE | Noted: 2022-08-01

## 2023-01-12 PROBLEM — D13.4 BENIGN NEOPLASM OF LIVER: Status: ACTIVE | Noted: 2022-08-29

## 2023-01-12 PROBLEM — T85.698A OTHER MECHANICAL COMPLICATION OF OTHER SPECIFIED INTERNAL PROSTHETIC DEVICES, IMPLANTS AND GRAFTS, INITIAL ENCOUNTER: Status: ACTIVE | Noted: 2022-10-21

## 2023-01-12 PROBLEM — Z51.0 ENCOUNTER FOR ANTINEOPLASTIC RADIATION THERAPY: Status: ACTIVE | Noted: 2022-10-06

## 2023-01-12 PROBLEM — R91.1 SOLITARY PULMONARY NODULE: Status: ACTIVE | Noted: 2022-11-08

## 2023-01-12 PROBLEM — K21.9 GASTRO-ESOPHAGEAL REFLUX DISEASE WITHOUT ESOPHAGITIS: Status: ACTIVE | Noted: 2022-08-15

## 2023-01-12 PROBLEM — M84.421D: Status: ACTIVE | Noted: 2022-07-29

## 2023-01-12 PROBLEM — D70.9 NEUTROPENIA, UNSPECIFIED (HCC): Status: ACTIVE | Noted: 2022-09-23

## 2023-01-12 PROBLEM — F41.9 ANXIETY DISORDER, UNSPECIFIED: Status: ACTIVE | Noted: 2022-08-15

## 2023-01-12 PROBLEM — E66.01 MORBID (SEVERE) OBESITY DUE TO EXCESS CALORIES (HCC): Status: ACTIVE | Noted: 2022-08-15

## 2023-01-12 PROBLEM — R68.89 OTHER GENERAL SYMPTOMS AND SIGNS: Status: ACTIVE | Noted: 2022-08-05

## 2023-01-12 PROBLEM — J98.4 OTHER DISORDERS OF LUNG: Status: ACTIVE | Noted: 2022-09-19

## 2023-01-12 PROBLEM — R07.9 CHEST PAIN, UNSPECIFIED: Status: ACTIVE | Noted: 2022-10-26

## 2023-01-12 PROCEDURE — 97140 MANUAL THERAPY 1/> REGIONS: CPT

## 2023-01-12 PROCEDURE — 97110 THERAPEUTIC EXERCISES: CPT

## 2023-01-12 NOTE — PROGRESS NOTES
Physical Therapy: Daily Note   Patient: Elio Salcedo (75 y.o. female)   Examination Date:   Plan of Care/Certification Expiration Date: 23    No data recorded   :  1975 # of Visits since [facility]:   16   MRN: [id number]  CSN: [id number] Start of Care Date:   10/10/2022   Insurance: Payor: [employer] / Plan: [employer] [facility] / Product Type: *No Product type* /   Insurance ID: [id number] - (Commercial) Secondary Insurance (if applicable):    Referring Physician: MD Dr Susan Deluca   PCP: Gilda Delong MD Visits to Date/Visits Approved: )    No Show/Cancelled Appts:      Medical Diagnosis: Pathological fracture, unspecified humerus, initial encounter for fracture [W68.966F]    Treatment Diagnosis: s/p ORIF right humerus due to 2 pathological fxs (dx with lymphoma)        SUBJECTIVE EXAMINATION   Pain Level: Pain Screening  Patient Currently in Pain: No  Pain Assessment: 0-10    Patient Comments: Subjective: Pt. states she is still having some swelling in her R shd. Pt. states she is feeling stronger. Pt. got clearance to recieve Accupressure    HEP Compliance: Good        OBJECTIVE EXAMINATION   Restrictions:  No data recorded No data recorded No data recorded              TREATMENT     Exercises:      Treatment Reasoning    Exercise 1: \"don't shoot\" exercise at wall, 5x  Exercise 3: prone R shoulder ext x10  Exercise 4: prone scapular rows, bent elbow, 2# x 10 RUE  Exercise 5: standing bilateral UE scapation, 1 wt x 10  Exercise 6: standing lateral raises, 1# wt x 10 bilaterally  Exercise 8: wall slides abduction with eccentric flexion; H5'' x10                          Manual Therapy: (CPT 64304) Treatment Reasoning     Joint Mobilization: PROM with distraction R shoulder flexion, abduction, ER, and IR with oscillations and distraction intermittently for pain reduction and to promote relaxation.   Soft Tissue Mobilizaton: MFR over R  bicep triggor point and triggor point release to dec tightness and tension                      Pt Education: Additional Comments: *Pt dx with Lymphoma in Aug 2022 and is currently undergoing chemo and radiation* NO ESTIM OR US; Okay KT tape, CP, HP       ASSESSMENT     Assessment: Assessment: Tolerates new therex standing and in prone no inc in pain but very fatiguing. Pt. needing RB throughout mild SOB. MFR R bicep and triggor point release. Pt. looking to get accupuncture possibley as feels swollen. Edcuated can try KT for inflammation next session if unable to get relief with accupuncture. Body Structures, Functions, Activity Limitations Requiring Skilled Therapeutic Intervention: Decreased functional mobility , Decreased ROM, Decreased strength, Decreased endurance, Increased pain    Post-Treatment Pain Level:       Activity Tolerance: Patient limited by fatigue    Therapy Prognosis: Good       GOALS   Patient Goals : Be able to use her right arm overhead  Short Term Goals Completed by 1-2 weeks from date of evaluation Current Status Goal Status   Pt reports having 3/10 or less right shoulder pain with ADLS GOAL met, ave pain 3/10 Met   Pt reports able to complete her HEP 3-5x per week GOAL met, pt has been performing some exs near daily with exception of chemo weeks-less frequently Met                                                                       Long Term Goals Completed by 4-6 weeks from date of recheck on 12/13/2022 Current Status Goal Status   Pt reports having 2/10 or less R UE pain while performing ADLS and work duties Denies pain, just tightness with internal rotation RUE Met   Increase AROM of right shoulder to wfixkhb=283 degrees, abduction =140 degrees and IR= able to reach to her toleraance for improved function       Increase RUE strength to 4+/5 or >so that pt can perform overhead activities without increased pain       Improve her Spadi (Shoulder Pain & Disability Index) from 34% disability on eval to <10% by time of DC       Pt indep with an advanced HEP                                                    TREATMENT PLAN   Plan Frequency: 1x/wk  Plan weeks: 4 additional weeks from recheck 1/5/23   Requires PT Follow-Up: Yes  Additional Comments: *Pt dx with Lymphoma in Aug 2022 and is currently undergoing chemo and radiation* NO ESTIM OR US; Okay KT tape, CP, HP       Therapy Time  Individual Time In:       215  Individual Time Out:  300  Minutes:  45        Electronically signed by Rochelle Villegas PTA  on 4/50/7487 at 4:24 PM   POC NOTE  Physical Therapy

## 2023-01-17 ENCOUNTER — OFFICE VISIT (OUTPATIENT)
Dept: NEUROLOGY | Age: 48
End: 2023-01-17

## 2023-01-17 VITALS
HEART RATE: 118 BPM | WEIGHT: 247.1 LBS | BODY MASS INDEX: 39.88 KG/M2 | SYSTOLIC BLOOD PRESSURE: 102 MMHG | OXYGEN SATURATION: 94 % | DIASTOLIC BLOOD PRESSURE: 64 MMHG

## 2023-01-17 DIAGNOSIS — G56.12 MEDIAN NERVE DYSFUNCTION, LEFT: ICD-10-CM

## 2023-01-17 DIAGNOSIS — S06.0X9S CONCUSSION WITH LOSS OF CONSCIOUSNESS, SEQUELA (HCC): ICD-10-CM

## 2023-01-17 DIAGNOSIS — S06.0X9S CONCUSSION WITH LOSS OF CONSCIOUSNESS, SEQUELA (HCC): Primary | ICD-10-CM

## 2023-01-17 DIAGNOSIS — F07.81 POST-CONCUSSION SYNDROME: ICD-10-CM

## 2023-01-17 DIAGNOSIS — H53.149 PHOTOPHOBIA: ICD-10-CM

## 2023-01-17 DIAGNOSIS — R41.840 ATTENTION DEFICIT: ICD-10-CM

## 2023-01-17 DIAGNOSIS — M47.812 CERVICAL SPONDYLOSIS WITHOUT MYELOPATHY: ICD-10-CM

## 2023-01-17 PROBLEM — S06.0X9A CONCUSSION WITH LOSS OF CONSCIOUSNESS: Status: ACTIVE | Noted: 2023-01-17

## 2023-01-17 LAB
AMPHETAMINE SCREEN, URINE: NORMAL
BARBITURATE SCREEN URINE: NORMAL
BENZODIAZEPINE SCREEN, URINE: NORMAL
CANNABINOID SCREEN URINE: NORMAL
COCAINE METABOLITE SCREEN URINE: NORMAL
FENTANYL SCREEN, URINE: NORMAL
Lab: NORMAL
METHADONE SCREEN, URINE: NORMAL
OPIATE SCREEN URINE: NORMAL
OXYCODONE URINE: NORMAL
PHENCYCLIDINE SCREEN URINE: NORMAL
PROPOXYPHENE SCREEN: NORMAL

## 2023-01-17 RX ORDER — ACYCLOVIR 400 MG/1
TABLET ORAL
COMMUNITY
Start: 2022-12-26

## 2023-01-17 NOTE — PROGRESS NOTES
Subjective:      Patient ID: Edwin Rendon is a 56 y.o. female who presents today for:  Chief Complaint   Patient presents with    6 Month Follow-Up     Post concussion syndrome        HPI 52-year-old had female with history of postconcussive symptoms. The patient had difficulty concentration with cervical spondylosis. Patient is on Ritalin 10 mg in the morning and 10 mg at 2 PM with gabapentin and BuSpar. Patient actually doing somewhat better and she sees pain management for his cervical spondylosis. Patient does have postconcussion headaches and we had offered her CGRP blockers but at that time she was reporting good response to gabapentin and buspirone. Patient's main issue appears to be photophobia without any ongoing significant headaches. Patient reports that he does have occasional mild headache but not related to the photophobia. As well since last seen patient had arm pain on the left and was then diagnosed with lymphoma she is undergoing chemotherapy and does have some numbness and tingling and continues on gabapentin. This has been treated.   Her brain fogginess is much improved with the Ritalin    Past Medical History:   Diagnosis Date    Diffuse large B cell lymphoma ([facility] [address]     HLD (hyperlipidemia)     Migraine with aura and without status migrainosus, not intractable     Type 2 diabetes mellitus ([facility] [address] 11/2022     Past Surgical History:   Procedure Laterality Date    HAND SURGERY Right     Middle finger trigger finger release    IR TUNNELED CATHETER PLACEMENT GREATER THAN 5 YEARS  9/13/2022    IR TUNNELED CATHETER PLACEMENT GREATER THAN 5 YEARS    TONSILLECTOMY       Social History     Socioeconomic History    Marital status: Single     Spouse name: Not on file    Number of children: Not on file    Years of education: Not on file    Highest education level: Not on file   Occupational History    Not on file   Tobacco Use    Smoking status: Former    Smokeless tobacco: Current     Types: Chew   Substance and Sexual Activity    Alcohol use: Yes    Drug use: Never    Sexual activity: Not on file   Other Topics Concern    Not on file   Social History Narrative    Not on file     Social Determinants of Health     Financial Resource Strain: Low Risk     Difficulty of Paying Living Expenses: Not hard at all   Food Insecurity: No Food Insecurity    Worried About Running Out of Food in the Last Year: Never true    Ran Out of Food in the Last Year: Never true   Transportation Needs: Not on file   Physical Activity: Not on file   Stress: Not on file   Social Connections: Not on file   Intimate Partner Violence: Not on file   Housing Stability: Not on file     Family History   Problem Relation Age of Onset    No Known Problems Mother     Diabetes Father     Hypertension Father      Allergies   Allergen Reactions    Nickel Hives and Itching    Other      Other reaction(s): Dizziness    Oxycodone-Acetaminophen Other (See Comments)     nausea      Neosporin  [Bacitracin-Polymyxin B] Hives    Vitamin B12 Hives    Azithromycin Hives    Vitamin B Complex  [B Complex-B12] Hives       Current Outpatient Medications   Medication Sig Dispense Refill    acyclovir (ZOVIRAX) 400 MG tablet TAKE 1 TABLET BY MOUTH TWICE DAILY AFTER MEALS      methylphenidate (RITALIN) 10 MG tablet TAKE 1 TABLET BY MOUTH EVERY MORNING and TAKE 1 TABLET at 2pm 60 tablet 0    gabapentin (NEURONTIN) 300 MG capsule Take 4 capsules by mouth 2 times daily for 30 days. 240 capsule 0    blood glucose monitor kit and supplies Use as directed for type 2 diabetes. Please cover what is under insurance. 1 kit 0    Lancets MISC 1 each by Does not apply route 2 times daily 100 each 1    etonogestrel-ethinyl estradiol (NUVARING) 0.12-0.015 MG/24HR vaginal ring Vaginal: One ring, inserted vaginally and left in place continuously for 3 consecutive weeks, then removed for 1 week. A new ring is inserted 7 days after the last was removed 3 each 3    ondansetron (ZOFRAN  ODT) 4 MG disintegrating tablet Take 1 tablet by mouth every 8 hours as needed for Nausea or Vomiting 14 tablet 0    busPIRone (BUSPAR) 5 MG tablet Take 1 tablet by mouth 2 times daily 60 tablet 2    pantoprazole (PROTONIX) 20 MG tablet Take 2 tablets by mouth daily 60 tablet 0    ondansetron (ZOFRAN ODT) 4 MG disintegrating tablet Take 1 tablet by mouth every 8 hours as needed for Nausea 20 tablet 0    cyclobenzaprine (FLEXERIL) 10 MG tablet Take 10 mg by mouth 2 times daily as needed for Muscle spasms       blood glucose test strips (TRUE METRIX BLOOD GLUCOSE TEST) strip Test 2 times a day & as needed for symptoms of irregular blood glucose. 100 strip 5    pegfilgrastim (NEULASTA) 6 MG/0.6ML injection Inject 6 mg into the skin once (Patient not taking: Reported on 1/17/2023)      allopurinol (ZYLOPRIM) 300 MG tablet Take 300 mg by mouth daily (Patient not taking: Reported on 1/17/2023)      predniSONE (DELTASONE) 20 MG tablet Take 20 mg by mouth daily (Patient not taking: Reported on 1/17/2023)      Na Sulfate-K Sulfate-Mg Sulf (SUPREP) 17.5-3.13-1.6 GM/177ML SOLN solution As directed (Patient not taking: Reported on 1/17/2023) 1 each 0    dicyclomine (BENTYL) 10 MG capsule Take 1 capsule by mouth 3 times daily as needed (abdominal pain) (Patient not taking: Reported on 1/17/2023) 90 capsule 1     No current facility-administered medications for this visit.         Review of Systems   Constitutional:  Negative for fever.   HENT:  Negative for ear pain, tinnitus and trouble swallowing.    Eyes:  Negative for photophobia and visual disturbance.   Respiratory:  Negative for choking and shortness of breath.    Cardiovascular:  Negative for chest pain and palpitations.   Gastrointestinal:  Negative for nausea and vomiting.   Musculoskeletal:  Negative for back pain, gait problem, joint swelling, myalgias, neck pain and neck stiffness.   Skin:  Negative for color change.   Allergic/Immunologic: Negative for food  allergies. Neurological:  Negative for dizziness, tremors, seizures, syncope, facial asymmetry, speech difficulty, weakness, light-headedness, numbness and headaches. Psychiatric/Behavioral:  Negative for behavioral problems, confusion, hallucinations and sleep disturbance. Objective:   /64 (Site: Left Upper Arm, Position: Sitting, Cuff Size: Large Adult)   Pulse (!) 118   Wt 247 lb 1.6 oz (112.1 kg)   SpO2 94%   BMI 39.88 kg/m²     Physical Exam  Vitals reviewed. Eyes:      Pupils: Pupils are equal, round, and reactive to light. Cardiovascular:      Rate and Rhythm: Normal rate and regular rhythm. Heart sounds: No murmur heard. Pulmonary:      Effort: Pulmonary effort is normal.      Breath sounds: Normal breath sounds. Abdominal:      General: Bowel sounds are normal.   Musculoskeletal:         General: Normal range of motion. Cervical back: Normal range of motion. Skin:     General: Skin is warm. Neurological:      Mental Status: She is alert and oriented to person, place, and time. Cranial Nerves: No cranial nerve deficit. Sensory: No sensory deficit. Motor: No abnormal muscle tone. Coordination: Coordination normal.      Deep Tendon Reflexes: Reflexes are normal and symmetric. Babinski sign absent on the right side. Babinski sign absent on the left side. Psychiatric:         Mood and Affect: Mood normal.       No results found.     Lab Results   Component Value Date/Time    WBC 5.9 01/18/2023 10:45 AM    WBC 35.5 09/04/2022 11:10 PM    RBC 4.29 01/18/2023 10:45 AM    HGB 11.6 01/18/2023 10:45 AM    HCT 35.8 01/18/2023 10:45 AM    MCV 83 01/18/2023 10:45 AM    MCH 26.5 09/04/2022 11:10 PM    MCHC 32.4 01/18/2023 10:45 AM    RDW 12.9 09/04/2022 11:10 PM     01/18/2023 10:45 AM     Lab Results   Component Value Date/Time     01/18/2023 10:45 AM    K 4.1 01/18/2023 10:45 AM    K 3.6 09/04/2022 11:10 PM     01/18/2023 10:45 AM    CO2 22 09/04/2022 11:10 PM    BUN 15 09/04/2022 11:10 PM    CREATININE 0.81 01/18/2023 10:45 AM    GFRAA >60.0 09/04/2022 11:10 PM    LABGLOM >60.0 09/04/2022 11:10 PM    GLUCOSE 111 01/18/2023 10:45 AM    PROT 7.2 01/18/2023 10:45 AM    LABALBU 4.1 01/18/2023 10:45 AM    CALCIUM 9.2 01/18/2023 10:45 AM    BILITOT 0.3 01/18/2023 10:45 AM    ALKPHOS 51 01/18/2023 10:45 AM    AST 21 01/18/2023 10:45 AM    ALT 20 01/18/2023 10:45 AM     Lab Results   Component Value Date/Time    PROTIME 12.2 09/04/2022 11:10 PM    INR 0.9 09/04/2022 11:10 PM     No results found for: TSH, [id number], FOLATE, FERRITIN, IRON, TIBC, PTRFSAT, RETICCOUNT, TSH, FREET4  Lab Results   Component Value Date/Time    HDL 60 06/17/2022 10:49 AM    LDLCALC 131 06/17/2022 10:49 AM     Lab Results   Component Value Date/Time    LABAMPH Neg 01/17/2023 03:48 PM    BARBSCNU Neg 01/17/2023 03:48 PM    LABBENZ Neg 01/17/2023 03:48 PM    LABMETH Neg 01/17/2023 03:48 PM    OPIATESCREENURINE Neg 01/17/2023 03:48 PM    PHENCYCLIDINESCREENURINE Neg 01/17/2023 03:48 PM     No results found for: LITHIUM, DILFRTOT, VALPROATE    Assessment:       Diagnosis Orders   1. Concussion with loss of consciousness, sequela [facility])  Urine Drug Screen    External Referral to Neurology      2. Post-concussion syndrome        3. Median nerve dysfunction, left        4. Attention deficit        5. Cervical spondylosis without myelopathy        Concussion with postconcussive syndrome. Patient had fogginess and attention issues. We will start her on Ritalin 10 mg twice a day if she taking actually much better with the same. Please see she had arm pain on the right and was diagnosed with a lymphoma , a very rare presentation and has been treated  With chemotherapy and she is recovering from the same.      Her main issues appears to be photophobia without any associated headache and I am not quite sure if this truly is a migrainous phenomena or not and we had discussed use of injectable CGRP blockers as a trial to see if this was a visual snow syndrome. She rather though follow-up with Dr. Archana Phelan her headache specialist whose name was given to her by one of her oncology nurses. I did discuss with her that we can treat headaches and similar syndromes here in our office    We keep her on Ritalin and continue to keep observation. She has nerve pain is on gabapentin as well. He had some anxieties and continues on buspirone which we had given her and this is helping as well. We will follow in a few months and earlier if there are any concerns and if she would like to follow-up with Dr. Archana Phelan that should be reasonable. Owen Miranda MD, KINDRA  Diplomate, American Board of Psychiatry & Neurology  Board Certified in Vascular Neurology  Board Certified in Neuromuscular Medicine  Certified in [facility]:      Orders Placed This Encounter   Procedures    Urine Drug Screen     Standing Status:   Future     Number of Occurrences:   1     Standing Expiration Date:   1/17/2024    External Referral to Neurology     Referral Priority:   Routine     Referral Type:   Eval and Treat     Referral Reason:   Specialty Services Required     Referred to Provider:   Archana Phelan MD     Requested Specialty:   Neurology     Number of Visits Requested:   1     No orders of the defined types were placed in this encounter. Return in about 6 months (around 7/17/2023).       Miguel Gray MD

## 2023-01-19 ENCOUNTER — HOSPITAL ENCOUNTER (OUTPATIENT)
Dept: PHYSICAL THERAPY | Age: 48
Setting detail: THERAPIES SERIES
Discharge: HOME | End: 2023-01-19
Payer: COMMERCIAL

## 2023-01-19 DIAGNOSIS — E11.9 TYPE 2 DIABETES MELLITUS WITHOUT COMPLICATION, UNSPECIFIED WHETHER LONG TERM INSULIN USE (HCC): ICD-10-CM

## 2023-01-19 DIAGNOSIS — F07.81 POST-CONCUSSION SYNDROME: ICD-10-CM

## 2023-01-19 PROBLEM — H53.149 PHOTOPHOBIA: Status: ACTIVE | Noted: 2022-07-19

## 2023-01-19 PROCEDURE — 97110 THERAPEUTIC EXERCISES: CPT

## 2023-01-19 PROCEDURE — 97140 MANUAL THERAPY 1/> REGIONS: CPT

## 2023-01-19 RX ORDER — CALCIUM CITRATE/VITAMIN D3 200MG-6.25
TABLET ORAL
Qty: 100 STRIP | Refills: 5 | Status: SHIPPED | OUTPATIENT
Start: 2023-01-19

## 2023-01-19 ASSESSMENT — ENCOUNTER SYMPTOMS
COLOR CHANGE: 0
PHOTOPHOBIA: 0
SHORTNESS OF BREATH: 0
VOMITING: 0
TROUBLE SWALLOWING: 0
NAUSEA: 0
BACK PAIN: 0
CHOKING: 0

## 2023-01-19 ASSESSMENT — PAIN SCALES - GENERAL: PAINLEVEL_OUTOF10: 1

## 2023-01-19 NOTE — TELEPHONE ENCOUNTER
Pharmacy is requesting medication refill. Please approve or deny this request.    Rx requested:  Requested Prescriptions     Pending Prescriptions Disp Refills    gabapentin (NEURONTIN) 300 MG capsule [Pharmacy Med Name: gabapentin 300 mg capsule] 240 capsule 0     Sig: Take 4 capsules by mouth 2 times daily for 30 days.          Last Office Visit:   1/17/2023      Next Visit Date:  Future Appointments   Date Time Provider Marleny Ta   1/19/2023  1:11 PM Meño Forman Harms [address] [place]   1/26/2023 59:34 AM Kate Bustillos, PTA [address] [place]   2/2/2023 78:45 AM Kateipa Bustillos, PTA [address] [place]   2/28/2023  1:30 PM Magi Rivera MD [facility]   7/11/2023  2:15 PM Elodia Quesada MD [facility] [address] [facility] Neurology -

## 2023-01-19 NOTE — PROGRESS NOTES
Daily Treatment Note  Date: 2023  Patient Name: Katherin Granda  MRN: [id number]     :   1975    Referring Physician: MD Dr Corie Nguyen   PCP: Loren Coto MD    Medical Diagnosis: Pathological fracture, unspecified humerus, initial encounter for fracture [R51.148N]    Treatment Diagnosis: s/p ORIF right humerus due to 2 pathological fxs (dx with lymphoma)      Insurance: Payor: [employer] / Plan: [address] [place] [address] / Product Type: *No Product type* /   Insurance ID: [id number] - (Commercial)    Subjective:   General  Referring Provider (secondary): Dr Corie Corbett  PT Visit Information  Onset Date: 08/15/22  PT Insurance Information: (40 per year)  Total # of Visits Approved:  (19)  Total # of Visits to Date:   Plan of Care/Certification Expiration Date: 23  No Show: 1  Canceled Appointment: 0  Referring Provider (secondary): Dr Corie Corbett  Subjective  Subjective: Pt. states she was able to have Accupressure session on Wednesday and good releif. Pt. states she had good relief 1/10. Pain Screening  Patient Currently in Pain: Yes  Pain Assessment: 0-10  Pain Level: 1       Treatment Activities:  Exercises:      Treatment Reasoning    Exercise 3: prone horizontal abd R x 5  Exercise 7: wall slides fleixon with eccentric abduction x 10 hold 3 sec1.5#  Exercise 8: wall slides abduction with eccentric flexion; H5'' x 5 1.5# some crampping  deferred  Exercise 9: prone RUE rows x 4 5# , deferred x 10  3#  Exercise 10: prone R shd ext 2 # x 10  Exercise 13: UBE standing retro x 2 min                          Manual Therapy: (CPT 17140) Treatment Reasoning     Joint Mobilization: PROM with distraction R shoulder flexion, abduction, ER, and IR with oscillations and distraction intermittently for pain reduction and to promote relaxation.  R scap mobs to dec tightness MFR post shd to dec tightness                       Assessment:   Conditions Requiring Skilled Therapeutic Intervention  Body Structures, Functions, Activity Limitations Requiring Skilled Therapeutic Intervention: Decreased functional mobility ; Decreased ROM; Decreased strength;Decreased endurance; Increased pain  Assessment: Attempted wall slides with inc # and UBE to support str and endurance tolerates well with min c/o pain but however endurance low thus mild SOB throughout and inc RB's. Cont with manual therapy and demoing good ROM of R shd.  Pain post 1/10. Pt.to ice at home. Treatment Diagnosis: s/p ORIF right humerus due to 2 pathological fxs (dx with lymphoma)      G-Code:       OutComes Score:                                                     Goals:  Short Term Goals  Time Frame for Short Term Goals: 1-2 weeks from date of evaluation  Short Term Goal 1: Pt reports having 3/10 or less right shoulder pain with ADLS  STG 1 Current Status[de-identified] GOAL met, ave pain 3/10  STG Goal 1 Status[de-identified] Met  Short Term Goal 2: Pt reports able to complete her HEP 3-5x per week  STG 2 Current Status[de-identified] GOAL met, pt has been performing some exs near daily with exception of chemo weeks-less frequently  STG Goal 2 Status[de-identified] Met  Long Term Goals  Time Frame for Long Term Goals : 4-6 weeks from date of recheck on 12/13/2022  Long Term Goal 1: Pt reports having 2/10 or less R UE pain while performing ADLS and work duties  LTG 1 Current Status[de-identified] Denies pain, just tightness with internal rotation RUE  LTG Goal 1 Status[de-identified] Met  Long Term Goal 2: Increase AROM of right shoulder to [id number]=411 degrees, abduction =140 degrees and IR= able to reach to her toleraance for improved function  Long Term Goal 3:  Increase RUE strength to 4+/5 or >so that pt can perform overhead activities without increased pain  Long Term Goal 4: Improve her Spadi (Shoulder Pain & Disability Index) from 34% disability on eval to <10% by time of DC  Long Term Goal 5: Pt indep with an advanced HEP  Patient Goals   Patient Goals : Be able to use her right arm overhead    Plan:         Cont POC    Therapy Time:   Individual Concurrent Group Co-treatment   Time In  215         Time Out  300         Minutes  [facility] [place]         Physical Therapy

## 2023-01-20 RX ORDER — GABAPENTIN 300 MG/1
1200 CAPSULE ORAL 2 TIMES DAILY
Qty: 240 CAPSULE | Refills: 0 | Status: SHIPPED | OUTPATIENT
Start: 2023-01-20 | End: 2023-03-17

## 2023-01-26 ENCOUNTER — HOSPITAL ENCOUNTER (OUTPATIENT)
Dept: PHYSICAL THERAPY | Age: 48
Setting detail: THERAPIES SERIES
Discharge: HOME | End: 2023-01-26
Payer: COMMERCIAL

## 2023-01-26 PROCEDURE — 97140 MANUAL THERAPY 1/> REGIONS: CPT

## 2023-01-26 PROCEDURE — 97110 THERAPEUTIC EXERCISES: CPT

## 2023-01-26 NOTE — PROGRESS NOTES
Physical Therapy: Daily Note   Patient: Reshma Ledesma (38 y.o. female)   Examination Date:   Plan of Care/Certification Expiration Date: 23    No data recorded   :  1975 # of Visits since [facility]:   23   MRN: [id number]  CSN: [id number] Start of Care Date:   10/10/2022   Insurance: Payor: [employer] / Plan: [employer] [facility] / Product Type: *No Product type* /   Insurance ID: [id number] - (Commercial) Secondary Insurance (if applicable):    Referring Physician: MD Dr Reji Domínguez   PCP: Oswaldo Thomas MD Visits to Date/Visits Approved:   ()    No Show/Cancelled Appts:      Medical Diagnosis: Pathological fracture, unspecified humerus, initial encounter for fracture [D48.548J]    Treatment Diagnosis: s/p ORIF right humerus due to 2 pathological fxs (dx with lymphoma)        SUBJECTIVE EXAMINATION   Pain Level: Pain Screening  Patient Currently in Pain: No    Patient Comments: Subjective: Pt. states she was in the hospital due to heart thinking possible blood clot or embolysm negative for both. However diagnosed with high heart rate and is now on medication for tachycardia. Pt. states she hasn't been able to do all of her exercises.     HEP Compliance: Good        OBJECTIVE EXAMINATION   Restrictions:  No data recorded No data recorded No data recorded              TREATMENT     Exercises:      Treatment Reasoning    Exercise 7: wall slides fleixon with eccentric abduction x 10 hold 3 sec1.5#  Exercise 8: wall slides abduction with eccentric flexion; H5'' x 10 1.5#  Exercise 9: B shd ext GTB x 20 hold 5 sec  Exercise 10: Mid Rows GTB hold 5 sec x 20  Exercise 11: BUE ER x 20 hold 3-5 sec RTB  Exercise 13: UBE standing retro x 2 min                          Manual Therapy: (CPT 07161) Treatment Reasoning     Joint Mobilization: PROM with distraction R shoulder flexion, abduction, ER, and IR with oscillations and distraction intermittently for pain reduction and to promote relaxation. R scap mobs to dec tightness MFR post shd to dec tightness                      Pt Education: Additional Comments: *Pt dx with Lymphoma in Aug 2022 and is currently undergoing chemo and radiation* NO ESTIM OR US; Okay KT tape, CP, HP       ASSESSMENT     Assessment: Assessment: Pt. with inc fatigue this date following dx of tachycardia.  Frequent RB throguhout session and pt. monitoring her HR. Everything WNL. 105-115 bpm throughout.  Took BP and WNL.  Pt. encouraged to take RB as needed but able to cont strength ROM and endurance.  No pain post session.  Body Structures, Functions, Activity Limitations Requiring Skilled Therapeutic Intervention: Decreased functional mobility , Decreased ROM, Decreased strength, Decreased endurance, Increased pain    Post-Treatment Pain Level:      Activity Tolerance: Patient limited by fatigue    Therapy Prognosis: Good       GOALS   Patient Goals : Be able to use her right arm overhead  Short Term Goals Completed by 1-2 weeks from date of evaluation Current Status Goal Status   Pt reports having 3/10 or less right shoulder pain with ADLS GOAL met, ave pain 3/10 Met   Pt reports able to complete her HEP 3-5x per week GOAL met, pt has been performing some exs near daily with exception of chemo weeks-less frequently Met                                                                       Long Term Goals Completed by 4-6 weeks from date of recheck on 12/13/2022 Current Status Goal Status   Pt reports having 2/10 or less R UE pain while performing ADLS and work duties Denies pain, just tightness with internal rotation RUE Met   Increase AROM of right shoulder to ootrfav=887 degrees, abduction =140 degrees and IR= able to reach to her toleraance for improved function       Increase RUE strength to 4+/5 or >so that pt can perform overhead activities without increased pain       Improve her Spadi (Shoulder Pain & Disability Index) from 34% disability  on eval to <10% by time of DC       Pt indep with an advanced HEP                                                    TREATMENT PLAN   Plan Frequency: 1x/wk  Plan weeks: 4 additional weeks from recheck 1/5/23  Current Treatment Recommendations: Strengthening, Functional mobility training, ROM, ADL/Self-care training, Endurance training, Manual, Neuromuscular re-education, Home exercise program, Modalities, Therapeutic activities   Additional Comments: *Pt dx with Lymphoma in Aug 2022 and is currently undergoing chemo and radiation* NO ESTIM OR US; Okay KT tape, CP, HP       Therapy Time  Individual Time In:    1100     Individual Time Out:  2117  Minutes:  45        Electronically signed by Sammy Marinelli PTA  on 1/79/7519 at 1:34 PM   POC NOTE  Physical Therapy

## 2023-01-31 ENCOUNTER — HOSPITAL ENCOUNTER (OUTPATIENT)
Dept: WOMENS IMAGING | Age: 48
Discharge: HOME | End: 2023-02-02
Payer: COMMERCIAL

## 2023-01-31 DIAGNOSIS — Z12.31 ENCOUNTER FOR SCREENING MAMMOGRAM FOR MALIGNANT NEOPLASM OF BREAST: ICD-10-CM

## 2023-01-31 PROCEDURE — 77067 SCR MAMMO BI INCL CAD: CPT

## 2023-02-02 ENCOUNTER — HOSPITAL ENCOUNTER (OUTPATIENT)
Dept: PHYSICAL THERAPY | Age: 48
Setting detail: THERAPIES SERIES
Discharge: HOME | End: 2023-02-02
Payer: COMMERCIAL

## 2023-02-02 PROCEDURE — 97140 MANUAL THERAPY 1/> REGIONS: CPT

## 2023-02-02 PROCEDURE — 97110 THERAPEUTIC EXERCISES: CPT

## 2023-02-02 NOTE — PROGRESS NOTES
Physical Therapy: Daily Note   Patient: Sandra Das (42 y.o. female)   Examination Date:   Plan of Care/Certification Expiration Date: 23    No data recorded   :  1975 # of Visits since [facility]:   20   MRN: [id number]  CSN: [id number] Start of Care Date:   10/10/2022   Insurance: Payor: [employer] / Plan: [employer] [facility] / Product Type: *No Product type* /   Insurance ID: [id number] - (Commercial) Secondary Insurance (if applicable):    Referring Physician: MD Dr Rodney Rose   PCP: La Salazar MD Visits to Date/Visits Approved:   ()    No Show/Cancelled Appts:      Medical Diagnosis: Pathological fracture, unspecified humerus, initial encounter for fracture [S08.293D]    Treatment Diagnosis: s/p ORIF right humerus due to 2 pathological fxs (dx with lymphoma)        SUBJECTIVE EXAMINATION   Pain Level: Pain Screening  Patient Currently in Pain: No    Patient Comments: Subjective: Pt. states she is to go for MRI of heart tomorrow. Pt. states MD does not want her to go over 130 bpm. Reports fluid around the heart and no pain. Pt. denies shd pain. HEP Compliance: Good        OBJECTIVE EXAMINATION   Restrictions:  No data recorded No data recorded No data recorded              TREATMENT     Exercises:      Treatment Reasoning    Exercise 1: \"don't shoot\" exercise at wall, 10x  Exercise 2: standing fornt raises 2#  x 10  Exercise 3: standing scaption 2# x 10  Exercise 4: standing lateral raise x 10 2# to  Exercise 5: iron cross x 10  Exercise 6: pulleys flexion nd abduction x 10 ea hold 3-5 sec  Exercise 13: UBE standing retro x 2 min 1 min intervals monitoring  bpm post                          Manual Therapy: (CPT 92977) Treatment Reasoning     Joint Mobilization: PROM with distraction R shoulder flexion, abduction, ER, and IR with oscillations and distraction intermittently for pain reduction and to promote relaxation.  R scap mobs to dec tightness  Soft Tissue Mobilizaton: MFR over R  bicep triggor point and triggor point release to dec tightness and tension                      Pt Education: Additional Comments: *Pt dx with Lymphoma in Aug 2022 and is currently undergoing chemo and radiation* NO ESTIM OR US; Okay KT tape, CP, HP       ASSESSMENT     Assessment: Assessment: Pt. needed extra time between activites as had to monitor HR with therex this date. Pt. to MRI for heart tommorrow tolerates JACOB for shd well taking her time with activity. No pain post.  Body Structures, Functions, Activity Limitations Requiring Skilled Therapeutic Intervention: Decreased functional mobility , Decreased ROM, Decreased strength, Decreased endurance, Increased pain    Post-Treatment Pain Level:       Activity Tolerance: Patient limited by fatigue    Therapy Prognosis: Good       GOALS   Patient Goals : Be able to use her right arm overhead  Short Term Goals Completed by   Current Status Goal Status   Pt reports having 3/10 or less right shoulder pain with ADLS GOAL met, ave pain 3/10 Met   Pt reports able to complete her HEP 3-5x per week GOAL met, pt has been performing some exs near daily with exception of chemo weeks-less frequently Met                                                                       Long Term Goals Completed by 4-6 weeks from date of recheck on 12/13/2022 Current Status Goal Status   Pt reports having 2/10 or less R UE pain while performing ADLS and work duties Denies pain, just tightness with internal rotation RUE Met   Increase AROM of right shoulder to agotqvu=273 degrees, abduction =140 degrees and IR= able to reach to her toleraance for improved function GOAL not yet met however ROM improving Recheck on 11/15/2022: Shoulder flexion= 130 Right shoudler abduction=   100 deg   IR= Functional IR thumb to L1-2 Not Met   Increase RUE strength to 4+/5 or >so that pt can perform overhead activities without increased pain improving, continues with weakness in abduction and flexion corrine with chest to overhead lift In progress   Improve her Spadi (Shoulder Pain & Disability Index) from 34% disability on eval to <10% by time of DC Spadi at recheck= 11% imprioved from 15% at time at last recheck; revise to <5% Met, New   Pt indep with an advanced HEP progressin to free weights In progress                                                TREATMENT PLAN   Plan Frequency: 1x/wk  Plan weeks: 4 additional weeks from recheck 1/5/23  Current Treatment Recommendations: Strengthening, Functional mobility training, ROM, ADL/Self-care training, Endurance training, Manual, Neuromuscular re-education, Home exercise program, Modalities, Therapeutic activities   Additional Comments: *Pt dx with Lymphoma in Aug 2022 and is currently undergoing chemo and radiation* NO ESTIM OR US; Anna KT tape, CP, HP       Therapy Time  Individual Time In:       1100  Individual Time Out:  2046  Minutes:  45        Electronically signed by Ava Marques PTA  on 5/0/4511 at 5:00 PM   POC NOTE  Physical Therapy

## 2023-02-07 ENCOUNTER — PATIENT MESSAGE (OUTPATIENT)
Dept: PULMONOLOGY | Age: 48
End: 2023-02-07

## 2023-02-07 NOTE — TELEPHONE ENCOUNTER
I'll try and access the images and see what is going on other then that we do not have access to [facility] records other then having imagines pulled to our system. That would be including lab work and notes.

## 2023-02-08 ENCOUNTER — HOSPITAL ENCOUNTER (OUTPATIENT)
Dept: PHYSICAL THERAPY | Age: 48
Setting detail: THERAPIES SERIES
Discharge: HOME | End: 2023-02-08
Payer: COMMERCIAL

## 2023-02-08 PROCEDURE — 97530 THERAPEUTIC ACTIVITIES: CPT

## 2023-02-08 PROCEDURE — 97110 THERAPEUTIC EXERCISES: CPT

## 2023-02-08 ASSESSMENT — PAIN SCALES - GENERAL: PAINLEVEL_OUTOF10: 2

## 2023-02-08 ASSESSMENT — PAIN DESCRIPTION - LOCATION: LOCATION: SHOULDER

## 2023-02-08 ASSESSMENT — PAIN DESCRIPTION - DESCRIPTORS: DESCRIPTORS: ACHING

## 2023-02-08 ASSESSMENT — PAIN DESCRIPTION - ORIENTATION: ORIENTATION: RIGHT

## 2023-02-08 ASSESSMENT — PAIN DESCRIPTION - PAIN TYPE: TYPE: SURGICAL PAIN

## 2023-02-08 NOTE — PROGRESS NOTES
Physical Therapy: Discharge Note   Patient: Tarah Shine (88 y.o. female)   Examination Date:   Plan of Care/Certification Expiration Date: 23    Progress Note Counter: DC to HEP     :  1975 # of Visits since [facility]:      MRN: [id number]  CSN: [id number] Start of Care Date:   10/10/2022   Insurance: Payor: [employer] / Plan: MED[employer] MUTUAL [facility] / Product Type: *No Product type* /   Insurance ID: [id number] - (Commercial) Secondary Insurance (if applicable):    Referring Physician: MD Dr Darren Sim   PCP: Lorenzo Sheth MD Visits to Date/Visits Approved: )    No Show/Cancelled Appts:      Medical Diagnosis: Pathological fracture, unspecified humerus, initial encounter for fracture [O77.664R]    Treatment Diagnosis: s/p ORIF right humerus due to 2 pathological fxs (dx with lymphoma)        SUBJECTIVE EXAMINATION   Pain Level: Pain Screening  Patient Currently in Pain: Yes  Pain Assessment: 0-10  Pain Level: 2  Pain Type: Surgical pain  Pain Location: Shoulder  Pain Orientation: Right  Pain Descriptors: Aching    Patient Comments: Subjective: Pt reports that she is ready for DC and reports 1-2/10 achy right shoulder pain. HEP Compliance: Good        OBJECTIVE EXAMINATION   Restrictions:  No data recorded No data recorded No data recorded          Left AROM  Right AROM        AROM RUE (degrees)  R Shoulder Flexion (0-180): 0-173 in standing  R Shoulder ABduction (0-180): 0-163 in standing  R Shoulder Int Rotation  (0-70):  In standing to L 1-2 with reports of tightness       Left Strength  Right Strength              Strength RUE  R Shoulder Flexion: 4+/5  R Shoulder Extension: 4+/5  R Shoulder ABduction: 4+/5  R Shoulder Internal Rotation: 4+/5  R Shoulder External Rotation: 4+/5  R Elbow Flexion: 4+/5  R Elbow Extension: 4+/5         TREATMENT     Exercises:      Treatment Reasoning    Exercise 14: *Added Leticia prone ther ex (prone flexion, scaption, abduction, and extension)  Exercise 15: *Post shoulder stretch x 3 H30                          Pt Education: Additional Comments: DC to HEP       ASSESSMENT     Assessment: Assessment: Pt arrived to PT with minimal reports of right shoulder pain and feels that she is ready to be DC'd from PT and to continue on her own. PT completed DC measurements and then instructed pt in post shoulder strengthening exercises (Houghstons exercises) when laying edge of her bed. Pt was also given a copy of HEP as well as post shoulder stretch. Pt is progressing in all areas and has met the majority of her goals and in now DC to HEP. Body Structures, Functions, Activity Limitations Requiring Skilled Therapeutic Intervention: Decreased functional mobility , Decreased ROM, Decreased strength, Decreased endurance, Increased pain    Post-Treatment Pain Level:  same     Activity Tolerance: Patient limited by fatigue    Therapy Prognosis: Good       GOALS   Patient Goals : Be able to use her right arm overhead  Short Term Goals Completed by 1-2 weeks from date of evaluation Current Status Goal Status   Pt reports having 3/10 or less right shoulder pain with ADLS GOAL met, ave pain 2-3/10 Met   Pt reports able to complete her HEP 3-5x per week GOAL met Met                                                                       Long Term Goals Completed by 8-12 weeks Current Status Goal Status   Pt reports having 2/10 or less R UE pain while performing ADLS and work duties 0-1/71 R UE with ADLS and pt not back to work yet In progress   Increase AROM of right shoulder to gxhqiyv=165 degrees, abduction =140 degrees and IR= able to reach to her toleraance for improved function GOAL met adn IR ROM improving;   Shoulder flexion=0-173  Right shoudler abduction= 0-163     IR= Functional IR thumb to L1-2 Met   Increase RUE strength to 4+/5 or >so that pt can perform overhead activities without increased pain Pt improved with progress with and reach overhead without pain Met   Improve her Spadi (Shoulder Pain & Disability Index) from 34% disability on eval to <10% by time of DC DC=8% Met   Pt indep with an advanced HEP Goal met In progress                                                TREATMENT PLAN       Additional Comments: DC to HEP       Therapy Time  Individual Time In:   12:00pm      Individual Time Out:  1:00pm  Minutes:  60 min         Electronically signed by Noel Ferrera, PT  on 2/8/2023 at 2:12 PM

## 2023-02-16 ENCOUNTER — HOSPITAL ENCOUNTER (EMERGENCY)
Age: 48
Discharge: HOME | End: 2023-02-16
Attending: EMERGENCY MEDICINE | Admitting: EMERGENCY MEDICINE
Payer: COMMERCIAL

## 2023-02-16 VITALS
SYSTOLIC BLOOD PRESSURE: 121 MMHG | RESPIRATION RATE: 22 BRPM | DIASTOLIC BLOOD PRESSURE: 85 MMHG | BODY MASS INDEX: 45.8 KG/M2 | HEIGHT: 66 IN | WEIGHT: 285 LBS | TEMPERATURE: 97.8 F | HEART RATE: 105 BPM | OXYGEN SATURATION: 96 %

## 2023-02-16 DIAGNOSIS — T78.40XA ALLERGIC REACTION, INITIAL ENCOUNTER: Primary | ICD-10-CM

## 2023-02-16 LAB
ALBUMIN SERPL-MCNC: 4.3 G/DL (ref 3.5–4.6)
ALP BLD-CCNC: 75 U/L (ref 40–130)
ALT SERPL-CCNC: 22 U/L (ref 0–33)
ANION GAP SERPL CALCULATED.3IONS-SCNC: 19 MEQ/L (ref 9–15)
AST SERPL-CCNC: 19 U/L (ref 0–35)
BASOPHILS ABSOLUTE: 0 K/UL (ref 0–0.1)
BASOPHILS RELATIVE PERCENT: 0.3 % (ref 0.1–1.2)
BILIRUB SERPL-MCNC: 0.3 MG/DL (ref 0.2–0.7)
BUN BLDV-MCNC: 13 MG/DL (ref 6–20)
CALCIUM SERPL-MCNC: 9.9 MG/DL (ref 8.5–9.9)
CHLORIDE BLD-SCNC: 100 MEQ/L (ref 95–107)
CO2: 18 MEQ/L (ref 20–31)
CREAT SERPL-MCNC: 0.8 MG/DL (ref 0.5–0.9)
EOSINOPHILS ABSOLUTE: 0 K/UL (ref 0–0.4)
EOSINOPHILS RELATIVE PERCENT: 0 % (ref 0.7–5.8)
GFR SERPL CREATININE-BSD FRML MDRD: >60 ML/MIN/{1.73_M2}
GLOBULIN: 3.3 G/DL (ref 2.3–3.5)
GLUCOSE BLD-MCNC: 144 MG/DL (ref 70–99)
HCT VFR BLD CALC: 37.4 % (ref 37–47)
HEMOGLOBIN: 12 G/DL (ref 11.2–15.7)
IMMATURE GRANULOCYTES #: 0 K/UL
IMMATURE GRANULOCYTES %: 0.3 %
LYMPHOCYTES ABSOLUTE: 0.4 K/UL (ref 1.2–3.7)
LYMPHOCYTES RELATIVE PERCENT: 3.9 %
MCH RBC QN AUTO: 25.4 PG (ref 25.6–32.2)
MCHC RBC AUTO-ENTMCNC: 32.1 % (ref 32.2–35.5)
MCV RBC AUTO: 79.2 FL (ref 79.4–94.8)
MONOCYTES ABSOLUTE: 0.2 K/UL (ref 0.2–0.9)
MONOCYTES RELATIVE PERCENT: 1.3 % (ref 4.7–12.5)
NEUTROPHILS ABSOLUTE: 10.6 K/UL (ref 1.6–6.1)
NEUTROPHILS RELATIVE PERCENT: 94.2 % (ref 34–71.1)
PDW BLD-RTO: 14.2 % (ref 11.7–14.4)
PLATELET # BLD: 286 K/UL (ref 182–369)
POTASSIUM SERPL-SCNC: 4.2 MEQ/L (ref 3.4–4.9)
RBC # BLD: 4.72 M/UL (ref 3.93–5.22)
SODIUM BLD-SCNC: 137 MEQ/L (ref 135–144)
TOTAL PROTEIN: 7.6 G/DL (ref 6.3–8)
TROPONIN: <0.01 NG/ML (ref 0–0.01)
WBC # BLD: 11.3 K/UL (ref 4–10)

## 2023-02-16 PROCEDURE — 36415 COLL VENOUS BLD VENIPUNCTURE: CPT

## 2023-02-16 PROCEDURE — 85025 COMPLETE CBC W/AUTO DIFF WBC: CPT

## 2023-02-16 PROCEDURE — 84484 ASSAY OF TROPONIN QUANT: CPT

## 2023-02-16 PROCEDURE — 6370000000 HC RX 637 (ALT 250 FOR IP): Performed by: EMERGENCY MEDICINE

## 2023-02-16 PROCEDURE — 96372 THER/PROPH/DIAG INJ SC/IM: CPT

## 2023-02-16 PROCEDURE — 99284 EMERGENCY DEPT VISIT MOD MDM: CPT

## 2023-02-16 PROCEDURE — 93005 ELECTROCARDIOGRAM TRACING: CPT

## 2023-02-16 PROCEDURE — 6360000002 HC RX W HCPCS: Performed by: EMERGENCY MEDICINE

## 2023-02-16 PROCEDURE — 80053 COMPREHEN METABOLIC PANEL: CPT

## 2023-02-16 PROCEDURE — 6370000000 HC RX 637 (ALT 250 FOR IP): Performed by: NURSE PRACTITIONER

## 2023-02-16 RX ORDER — METHYLPREDNISOLONE SODIUM SUCCINATE 125 MG/2ML
125 INJECTION, POWDER, LYOPHILIZED, FOR SOLUTION INTRAMUSCULAR; INTRAVENOUS ONCE
Status: COMPLETED | OUTPATIENT
Start: 2023-02-16 | End: 2023-02-16

## 2023-02-16 RX ORDER — PREDNISONE 20 MG/1
20 TABLET ORAL 2 TIMES DAILY
Qty: 10 TABLET | Refills: 0 | Status: SHIPPED | OUTPATIENT
Start: 2023-02-16 | End: 2023-02-21

## 2023-02-16 RX ORDER — SPIRONOLACTONE 25 MG/1
25 TABLET ORAL DAILY
COMMUNITY

## 2023-02-16 RX ORDER — DIPHENHYDRAMINE HYDROCHLORIDE 50 MG/ML
25 INJECTION INTRAMUSCULAR; INTRAVENOUS ONCE
Status: DISCONTINUED | OUTPATIENT
Start: 2023-02-16 | End: 2023-02-16

## 2023-02-16 RX ORDER — DIPHENHYDRAMINE HCL 25 MG
25 CAPSULE ORAL ONCE
Status: COMPLETED | OUTPATIENT
Start: 2023-02-16 | End: 2023-02-16

## 2023-02-16 RX ORDER — DIPHENHYDRAMINE HCL 25 MG
25 TABLET ORAL
Status: DISCONTINUED | OUTPATIENT
Start: 2023-02-16 | End: 2023-02-16 | Stop reason: HOSPADM

## 2023-02-16 RX ORDER — FAMOTIDINE 20 MG/1
20 TABLET, FILM COATED ORAL ONCE
Status: COMPLETED | OUTPATIENT
Start: 2023-02-16 | End: 2023-02-16

## 2023-02-16 RX ORDER — DIPHENHYDRAMINE HCL 25 MG
50 TABLET ORAL
Status: COMPLETED | OUTPATIENT
Start: 2023-02-16 | End: 2023-02-16

## 2023-02-16 RX ADMIN — DIPHENHYDRAMINE HYDROCHLORIDE 25 MG: 25 CAPSULE ORAL at 14:11

## 2023-02-16 RX ADMIN — DIPHENHYDRAMINE HCL 50 MG: 25 TABLET ORAL at 10:37

## 2023-02-16 RX ADMIN — METHYLPREDNISOLONE SODIUM SUCCINATE 125 MG: 125 INJECTION, POWDER, FOR SOLUTION INTRAMUSCULAR; INTRAVENOUS at 10:37

## 2023-02-16 RX ADMIN — FAMOTIDINE 20 MG: 20 TABLET, FILM COATED ORAL at 14:11

## 2023-02-16 ASSESSMENT — PAIN DESCRIPTION - FREQUENCY: FREQUENCY: CONTINUOUS

## 2023-02-16 ASSESSMENT — PAIN - FUNCTIONAL ASSESSMENT
PAIN_FUNCTIONAL_ASSESSMENT: 0-10
PAIN_FUNCTIONAL_ASSESSMENT: NONE - DENIES PAIN

## 2023-02-16 ASSESSMENT — PAIN DESCRIPTION - DESCRIPTORS: DESCRIPTORS: TIGHTNESS

## 2023-02-16 ASSESSMENT — PAIN SCALES - GENERAL: PAINLEVEL_OUTOF10: 4

## 2023-02-16 ASSESSMENT — PAIN DESCRIPTION - PAIN TYPE: TYPE: ACUTE PAIN

## 2023-02-16 NOTE — ED PROVIDER NOTES
CC/HPI: 15-year-old female to the emergency department chief complaint of possible allergic reaction. Patient states that 4 days ago she started taking a probiotic with enzymes, and also started a new cranberry supplement and grapeseed extract supplement over-the-counter. Patient states the very next day 3 days ago she noticed some flushing about her face and into her upper chest and she felt like the area was swollen which caused some pressure. Patient has a history of lymphoma and underwent chemotherapy. Patient states that approximately 3 to 4 weeks ago she had some chest pressure into her neck and was admitted for 3 days at [facility] during which time she had an MRI and also had a CTA which ruled out blood clots. Patient states at that time her troponin was slightly elevated however she was seen and cleared by cardiology. She was started on metoprolol 25 mg twice daily secondary to tachycardia which was increased to 50 mg last week. VITALS/PMH/PSH: Reviewed per nurses notes    REVIEW OF SYSTEMS: As in chief complaint history of present illness, otherwise all other systems are reviewed and negative the total 10 systems reviewed    PHYSICAL EXAM:  GEN: Pt alert and oriented, no acute distress  HEENT:         Normocephalic/Atramatic        PERRL, EOMI       EACs and TMs clear b/l       Throat non-edematous. No erythema noted. No exudates noted. Moist membranes  NECK: Nontender, no signs of trauma, no lymphadenopathy. Patient with some redness and flushing about bilateral cheeks and face that extends into the neck and upper chest.  There is blanching with palpation. Patient also states that she feels a little swollen all over but I do not appreciate any other edema  HEART: Reg S1/S2, heart rate approximately 100. Without murmer, rub or gallop  LUNGS: Clear to auscultation bilaterally, respirations even and unlabored  ABDOMEN: Bowel sounds positive, soft, nondistended. Non-tender to palpation.   No guarding rebound or rigidity  MUSCULOSKELETAL/EXTREMITITES:  No signs of trauma, cyanosis or edema. No CVA tenderness  LYMPH:  no peripheral lympadenopathy noted  SKIN: As above otherwise warm & dry, no rash  NEUROLOGIC:  Alert and oriented. Speech clear    Medical decision making/ED course;  Patient remained stable throughout the course of her emergency department stay. With the facial flushing and redness and starting on 3 different over-the-counter supplements the day before her symptoms started I did feel that her symptoms were related to a reaction to the over-the-counter medications. Initially patient was given an IM injection of 125 mg of Solu-Medrol and given 50 mg of p.o. Benadryl. Patient was observed for close to 2 hours and on repeat examination the facial flushing had improved significantly and the patient had initially began feeling better. However prior to discharge patient stated that the tightness and swollen feeling in her face and chest felt like it was returning. At that time even though I felt the symptoms were related to the supplements I did not feel it necessary to get an EKG and lab work. ER EKG interpretation sinus tachycardia at 104 bpm with no signs of acute ST-T changes. Normal intervals. Lab work was obtained which showed a troponin which was negative  CMP was within normal limits other than CO2 low at 18 and glucose elevated at 144. White blood cell count was 11.3 with an H&H of 12 and 37.4 and platelets of 398    Patient was given an additional 25 mg of p.o. Benadryl and also 20 mg of p.o. Pepcid and observed. On repeat examination patient's flushing and redness had completely resolved. Patient stated that she was feeling much better. Patient was tachycardic in the 110s however she stated that she had not taken her metoprolol this morning but will take it as soon as she gets home. Patient stated that she was feeling much better.   Strongly encouraged to follow-up with her hematologist oncologist soon as possible. Encouraged to return for worsening or changes to symptoms. Final Clinical impression;  1) acute allergic reaction    Disposition/plan; patient discharged home in stable condition given discharge instructions on allergic reaction. Patient to follow-up with primary care physician and oncologist.  Return for worsening and or changes to symptoms take all medications as previously as prescribed however avoid the over-the-counter supplements.   Patient given prescription for prednisone and advised to take over-the-counter Benadryl as needed     Дмитрий Lange DO  02/16/23 1399

## 2023-02-18 LAB
EKG ATRIAL RATE: 104 BPM
EKG P AXIS: 49 DEGREES
EKG P-R INTERVAL: 148 MS
EKG Q-T INTERVAL: 364 MS
EKG QRS DURATION: 78 MS
EKG QTC CALCULATION (BAZETT): 478 MS
EKG R AXIS: 23 DEGREES
EKG T AXIS: 43 DEGREES
EKG VENTRICULAR RATE: 104 BPM

## 2023-02-20 ENCOUNTER — TELEPHONE (OUTPATIENT)
Dept: PULMONOLOGY | Age: 48
End: 2023-02-20

## 2023-03-02 LAB
BETA-2-MICROGLOBULIN (MG/L) IN SERUM: 1.8 MG/L (ref 0.7–2.2)
FIBRIN D-DIMER (NG/ML FEU) IN PLATELET POOR PLASMA: 956 NG/ML FEU
INR IN PPP BY COAGULATION ASSAY: 1 (ref 0.9–1.1)
LMW HEPARIN IN PPP BY CHROMOGENIC METHOD: 0.5 IU/ML
PROTHROMBIN TIME (PT) IN PPP BY COAGULATION ASSAY: 11.6 SEC (ref 9.8–13.4)
THYROTROPIN (MIU/L) IN SER/PLAS BY DETECTION LIMIT <= 0.05 MIU/L: 1.88 MIU/L (ref 0.44–3.98)

## 2023-03-16 DIAGNOSIS — F07.81 POST-CONCUSSION SYNDROME: ICD-10-CM

## 2023-03-17 DIAGNOSIS — F41.9 ANXIETY: Primary | ICD-10-CM

## 2023-03-17 RX ORDER — METHYLPHENIDATE HYDROCHLORIDE 10 MG/1
10 TABLET ORAL 2 TIMES DAILY
Qty: 60 TABLET | Refills: 0 | Status: SHIPPED | OUTPATIENT
Start: 2023-03-17 | End: 2023-04-16

## 2023-03-17 RX ORDER — GABAPENTIN 300 MG/1
1200 CAPSULE ORAL 2 TIMES DAILY
Qty: 240 CAPSULE | Refills: 0 | Status: SHIPPED | OUTPATIENT
Start: 2023-03-17 | End: 2023-05-15

## 2023-03-17 RX ORDER — GABAPENTIN 300 MG/1
1200 CAPSULE ORAL 2 TIMES DAILY
Qty: 240 CAPSULE | Refills: 0 | OUTPATIENT
Start: 2023-03-17 | End: 2023-04-16

## 2023-03-17 RX ORDER — PANTOPRAZOLE SODIUM 20 MG/1
40 TABLET, DELAYED RELEASE ORAL DAILY
Qty: 60 TABLET | Refills: 0 | Status: SHIPPED | OUTPATIENT
Start: 2023-03-17

## 2023-03-20 ENCOUNTER — TELEPHONE (OUTPATIENT)
Dept: FAMILY MEDICINE CLINIC | Age: 48
End: 2023-03-20

## 2023-03-20 NOTE — TELEPHONE ENCOUNTER
Fabian Woodard from [address] is requesting a copy of the ov notes from 11/29/2022. Please fax to [phone], Attn: Fabian Woodard    Thank you.

## 2023-03-21 ENCOUNTER — OFFICE VISIT (OUTPATIENT)
Dept: FAMILY MEDICINE CLINIC | Age: 48
End: 2023-03-21
Payer: COMMERCIAL

## 2023-03-21 VITALS
HEART RATE: 96 BPM | BODY MASS INDEX: 40.34 KG/M2 | HEIGHT: 66 IN | OXYGEN SATURATION: 99 % | WEIGHT: 251 LBS | DIASTOLIC BLOOD PRESSURE: 82 MMHG | SYSTOLIC BLOOD PRESSURE: 124 MMHG

## 2023-03-21 DIAGNOSIS — M48.02 FORAMINAL STENOSIS OF CERVICAL REGION: ICD-10-CM

## 2023-03-21 DIAGNOSIS — T14.8XXA HEMATOMA: ICD-10-CM

## 2023-03-21 DIAGNOSIS — F32.A ANXIETY AND DEPRESSION: ICD-10-CM

## 2023-03-21 DIAGNOSIS — M51.36 BULGING LUMBAR DISC: ICD-10-CM

## 2023-03-21 DIAGNOSIS — C85.10 B-CELL LYMPHOMA, UNSPECIFIED B-CELL LYMPHOMA TYPE, UNSPECIFIED BODY REGION (HCC): ICD-10-CM

## 2023-03-21 DIAGNOSIS — F41.9 ANXIETY AND DEPRESSION: ICD-10-CM

## 2023-03-21 DIAGNOSIS — E66.01 MORBID (SEVERE) OBESITY DUE TO EXCESS CALORIES (HCC): ICD-10-CM

## 2023-03-21 DIAGNOSIS — E11.9 TYPE 2 DIABETES MELLITUS WITHOUT COMPLICATION, UNSPECIFIED WHETHER LONG TERM INSULIN USE (HCC): Primary | ICD-10-CM

## 2023-03-21 PROCEDURE — 99214 OFFICE O/P EST MOD 30 MIN: CPT | Performed by: FAMILY MEDICINE

## 2023-03-21 RX ORDER — METHION/INOS/CHOL BT/B COM/LIV 110MG-86MG
CAPSULE ORAL
COMMUNITY

## 2023-03-21 RX ORDER — FOLIC ACID 1 MG/1
1000 TABLET ORAL DAILY
COMMUNITY
Start: 2023-02-25

## 2023-03-21 RX ORDER — METOPROLOL SUCCINATE 100 MG/1
100 TABLET, EXTENDED RELEASE ORAL 2 TIMES DAILY
COMMUNITY
Start: 2023-03-15

## 2023-03-21 RX ORDER — SERTRALINE HYDROCHLORIDE 25 MG/1
25 TABLET, FILM COATED ORAL DAILY
COMMUNITY

## 2023-03-21 RX ORDER — LORAZEPAM 0.5 MG/1
0.25 TABLET ORAL
COMMUNITY
Start: 2023-03-15

## 2023-03-21 RX ORDER — ENOXAPARIN SODIUM 150 MG/ML
1 INJECTION SUBCUTANEOUS EVERY 12 HOURS
COMMUNITY

## 2023-03-21 SDOH — ECONOMIC STABILITY: HOUSING INSECURITY
IN THE LAST 12 MONTHS, WAS THERE A TIME WHEN YOU DID NOT HAVE A STEADY PLACE TO SLEEP OR SLEPT IN A SHELTER (INCLUDING NOW)?: NO

## 2023-03-21 SDOH — ECONOMIC STABILITY: FOOD INSECURITY: WITHIN THE PAST 12 MONTHS, THE FOOD YOU BOUGHT JUST DIDN'T LAST AND YOU DIDN'T HAVE MONEY TO GET MORE.: NEVER TRUE

## 2023-03-21 SDOH — ECONOMIC STABILITY: FOOD INSECURITY: WITHIN THE PAST 12 MONTHS, YOU WORRIED THAT YOUR FOOD WOULD RUN OUT BEFORE YOU GOT MONEY TO BUY MORE.: NEVER TRUE

## 2023-03-21 SDOH — ECONOMIC STABILITY: INCOME INSECURITY: HOW HARD IS IT FOR YOU TO PAY FOR THE VERY BASICS LIKE FOOD, HOUSING, MEDICAL CARE, AND HEATING?: NOT HARD AT ALL

## 2023-03-21 ASSESSMENT — PATIENT HEALTH QUESTIONNAIRE - PHQ9
SUM OF ALL RESPONSES TO PHQ9 QUESTIONS 1 & 2: 1
SUM OF ALL RESPONSES TO PHQ QUESTIONS 1-9: 4
7. TROUBLE CONCENTRATING ON THINGS, SUCH AS READING THE NEWSPAPER OR WATCHING TELEVISION: 2
9. THOUGHTS THAT YOU WOULD BE BETTER OFF DEAD, OR OF HURTING YOURSELF: 0
2. FEELING DOWN, DEPRESSED OR HOPELESS: 1
SUM OF ALL RESPONSES TO PHQ QUESTIONS 1-9: 4
SUM OF ALL RESPONSES TO PHQ QUESTIONS 1-9: 4
6. FEELING BAD ABOUT YOURSELF - OR THAT YOU ARE A FAILURE OR HAVE LET YOURSELF OR YOUR FAMILY DOWN: 1
8. MOVING OR SPEAKING SO SLOWLY THAT OTHER PEOPLE COULD HAVE NOTICED. OR THE OPPOSITE, BEING SO FIGETY OR RESTLESS THAT YOU HAVE BEEN MOVING AROUND A LOT MORE THAN USUAL: 0
1. LITTLE INTEREST OR PLEASURE IN DOING THINGS: 0
4. FEELING TIRED OR HAVING LITTLE ENERGY: 0
3. TROUBLE FALLING OR STAYING ASLEEP: 0
10. IF YOU CHECKED OFF ANY PROBLEMS, HOW DIFFICULT HAVE THESE PROBLEMS MADE IT FOR YOU TO DO YOUR WORK, TAKE CARE OF THINGS AT HOME, OR GET ALONG WITH OTHER PEOPLE: 1
SUM OF ALL RESPONSES TO PHQ QUESTIONS 1-9: 4
5. POOR APPETITE OR OVEREATING: 0

## 2023-03-22 LAB
ABO GROUP (TYPE) IN BLOOD: NORMAL
ANTIBODY SCREEN: NORMAL
RH FACTOR: NORMAL

## 2023-03-29 ENCOUNTER — HOSPITAL ENCOUNTER (OUTPATIENT)
Dept: DATA CONVERSION | Facility: HOSPITAL | Age: 48
End: 2023-03-29
Attending: RADIOLOGY | Admitting: RADIOLOGY
Payer: COMMERCIAL

## 2023-03-29 DIAGNOSIS — I87.1 COMPRESSION OF VEIN: ICD-10-CM

## 2023-03-29 LAB
PATIENT TEMPERATURE: 37 DEGREES C
POCT ANION GAP, ARTERIAL: 11 MMOL/L (ref 10–25)
POCT BASE EXCESS, ARTERIAL: -1.5 MMOL/L (ref -2–3)
POCT CHLORIDE, ARTERIAL: 105 MMOL/L (ref 98–107)
POCT GLUCOSE, ARTERIAL: 120 MG/DL (ref 74–99)
POCT HCO3, ARTERIAL: 24.3 MMOL/L (ref 22–26)
POCT HEMATOCRIT, ARTERIAL: 38 % (ref 36–46)
POCT HEMOGLOBIN, ARTERIAL: 12.7 G/DL (ref 12–16)
POCT IONIZED CALCIUM, ARTERIAL: 1.17 MMOL/L (ref 1.1–1.33)
POCT LACTATE, ARTERIAL: 2.1 MMOL/L (ref 0.4–2)
POCT OXY HEMOGLOBIN, ARTERIAL: 97.3 % (ref 94–98)
POCT PCO2, ARTERIAL: 44 MMHG (ref 38–42)
POCT PH, ARTERIAL: 7.35 (ref 7.38–7.42)
POCT PO2, ARTERIAL: 222 MMHG (ref 85–95)
POCT POTASSIUM, ARTERIAL: 3.8 MMOL/L (ref 3.5–5.3)
POCT SO2, ARTERIAL: 99 % (ref 94–100)
POCT SODIUM, ARTERIAL: 136 MMOL/L (ref 136–145)

## 2023-03-30 LAB
POC ACTIVATED CLOTTING TIME LOW RANGE: 134 SECONDS (ref 89–169)
POC ACTIVATED CLOTTING TIME LOW RANGE: 186 SECONDS (ref 89–169)
POC ACTIVATED CLOTTING TIME LOW RANGE: 191 SECONDS (ref 89–169)

## 2023-04-24 RX ORDER — PANTOPRAZOLE SODIUM 20 MG/1
40 TABLET, DELAYED RELEASE ORAL DAILY
Qty: 60 TABLET | Refills: 0 | Status: SHIPPED | OUTPATIENT
Start: 2023-04-24

## 2023-05-14 DIAGNOSIS — F07.81 POST-CONCUSSION SYNDROME: ICD-10-CM

## 2023-05-15 RX ORDER — GABAPENTIN 300 MG/1
1200 CAPSULE ORAL 2 TIMES DAILY
Qty: 240 CAPSULE | Refills: 0 | Status: SHIPPED | OUTPATIENT
Start: 2023-05-15 | End: 2023-06-14

## 2023-05-30 RX ORDER — PANTOPRAZOLE SODIUM 20 MG/1
40 TABLET, DELAYED RELEASE ORAL DAILY
Qty: 60 TABLET | Refills: 0 | Status: SHIPPED | OUTPATIENT
Start: 2023-05-30

## 2023-06-22 ENCOUNTER — OFFICE VISIT (OUTPATIENT)
Dept: FAMILY MEDICINE CLINIC | Age: 48
End: 2023-06-22
Payer: COMMERCIAL

## 2023-06-22 VITALS
DIASTOLIC BLOOD PRESSURE: 70 MMHG | OXYGEN SATURATION: 98 % | BODY MASS INDEX: 40.34 KG/M2 | SYSTOLIC BLOOD PRESSURE: 122 MMHG | HEART RATE: 66 BPM | HEIGHT: 66 IN | TEMPERATURE: 97.2 F | WEIGHT: 251 LBS

## 2023-06-22 DIAGNOSIS — D64.9 ANEMIA, UNSPECIFIED TYPE: ICD-10-CM

## 2023-06-22 DIAGNOSIS — E11.9 TYPE 2 DIABETES MELLITUS WITHOUT COMPLICATION, UNSPECIFIED WHETHER LONG TERM INSULIN USE (HCC): ICD-10-CM

## 2023-06-22 DIAGNOSIS — Z12.11 COLON CANCER SCREENING: ICD-10-CM

## 2023-06-22 DIAGNOSIS — D64.9 ANEMIA, UNSPECIFIED TYPE: Primary | ICD-10-CM

## 2023-06-22 DIAGNOSIS — E66.01 MORBID (SEVERE) OBESITY DUE TO EXCESS CALORIES (HCC): ICD-10-CM

## 2023-06-22 DIAGNOSIS — C85.10 B-CELL LYMPHOMA, UNSPECIFIED B-CELL LYMPHOMA TYPE, UNSPECIFIED BODY REGION (HCC): ICD-10-CM

## 2023-06-22 DIAGNOSIS — M48.02 FORAMINAL STENOSIS OF CERVICAL REGION: ICD-10-CM

## 2023-06-22 DIAGNOSIS — F41.9 ANXIETY AND DEPRESSION: ICD-10-CM

## 2023-06-22 DIAGNOSIS — M51.36 BULGING LUMBAR DISC: ICD-10-CM

## 2023-06-22 DIAGNOSIS — L30.9 DERMATITIS: ICD-10-CM

## 2023-06-22 DIAGNOSIS — F32.A ANXIETY AND DEPRESSION: ICD-10-CM

## 2023-06-22 LAB
BASOPHILS # BLD: 0 K/UL (ref 0–0.2)
BASOPHILS NFR BLD: 0.6 %
EOSINOPHIL # BLD: 0.1 K/UL (ref 0–0.7)
EOSINOPHIL NFR BLD: 1.2 %
ERYTHROCYTE [DISTWIDTH] IN BLOOD BY AUTOMATED COUNT: 18.2 % (ref 11.5–14.5)
HBA1C MFR BLD: 6.8 % (ref 4.8–5.9)
HCT VFR BLD AUTO: 42.9 % (ref 37–47)
HGB BLD-MCNC: 13.9 G/DL (ref 12–16)
LYMPHOCYTES # BLD: 1.2 K/UL (ref 1–4.8)
LYMPHOCYTES NFR BLD: 18.9 %
MCH RBC QN AUTO: 24.5 PG (ref 27–31.3)
MCHC RBC AUTO-ENTMCNC: 32.4 % (ref 33–37)
MCV RBC AUTO: 75.4 FL (ref 79.4–94.8)
MONOCYTES # BLD: 0.5 K/UL (ref 0.2–0.8)
MONOCYTES NFR BLD: 7.8 %
NEUTROPHILS # BLD: 4.5 K/UL (ref 1.4–6.5)
NEUTS SEG NFR BLD: 71.5 %
PLATELET # BLD AUTO: 333 K/UL (ref 130–400)
RBC # BLD AUTO: 5.68 M/UL (ref 4.2–5.4)
WBC # BLD AUTO: 6.3 K/UL (ref 4.8–10.8)

## 2023-06-22 PROCEDURE — 99214 OFFICE O/P EST MOD 30 MIN: CPT | Performed by: FAMILY MEDICINE

## 2023-06-22 RX ORDER — ATOGEPANT 60 MG/1
1 TABLET ORAL DAILY
COMMUNITY
Start: 2023-04-14

## 2023-06-22 RX ORDER — LANOLIN ALCOHOL/MO/W.PET/CERES
1 CREAM (GRAM) TOPICAL 2 TIMES DAILY
COMMUNITY
Start: 2023-05-26

## 2023-06-22 RX ORDER — SERTRALINE HYDROCHLORIDE 25 MG/1
25 TABLET, FILM COATED ORAL DAILY
COMMUNITY
End: 2023-06-22

## 2023-06-22 NOTE — PROGRESS NOTES
consideration. 4.  Partially calcified soft tissue lesion noted in the posterior    mediastinum adjacent to the distal thoracic esophagus measuring 3 x 2.6 x    2.6 cm. No other lymphadenopathy in the inferior thorax, abdomen or    pelvis identified. Differential consideration includes partially    calcified granulomatous involvement, Castleman's disease or other soft    tissue posterior mediastinal mass. Please correlate with prior imaging,    if available. Nonemergent dedicated imaging of the chest is recommended    for further evaluation. RUQ 5/11/22  1. A heterogeneous area is seen in the superior aspect of the right lobe of the liver. Recommend MRI for further evaluation. 2.  No evidence of cholecystitis or Cholelithiasis. Small polyp is seen in the gallbladder wall. A/P: Sharma Face 53 y.o. female presenting for     1. Anemia, unspecified type  Repeat levels   - Iron and TIBC; Future  - Ferritin; Future  - CBC with Auto Differential; Future    2. Type 2 diabetes mellitus without complication, unspecified whether long term insulin use (HCC)  Hemoglobin A1C   Date Value Ref Range Status   11/17/2022 7.4 (H) 4.8 - 5.9 % Final       - Hemoglobin A1C; Future    3. [facility] cancer screening    [facility]    4. Morbid (severe) obesity due to excess calories ([facility] [address]      5. Anxiety and depression  Stable   F/u with psychiatry     6. B-cell lymphoma, unspecified B-cell lymphoma type, unspecified body region [facility])  Oncology   Doing well with treatments     7. Bulging lumbar disc      8. Foraminal stenosis of cervical region      9. Dermatitis  May be 2/2 to the [address] [place] [address] Street. Stop deodorant   Continue to monitor. If worsens can try a fungal cream if that does not help then try the steroid.              Please note, this report has been partially produced using speech recognition software  and may cause  and /or contain errors related to that system including grammar, punctuation and

## 2023-06-23 LAB
FERRITIN: 55 NG/ML (ref 13–150)
IRON SATURATION: 17 % (ref 20–55)
IRON: 74 UG/DL (ref 37–145)
TOTAL IRON BINDING CAPACITY: 447 UG/DL (ref 250–450)
UNSATURATED IRON BINDING CAPACITY: 373 UG/DL (ref 112–347)

## 2023-07-05 PROBLEM — S06.0X9A CONCUSSION WITH LOSS OF CONSCIOUSNESS: Status: RESOLVED | Noted: 2023-01-17 | Resolved: 2023-07-05

## 2023-07-05 PROBLEM — G43.909 MIGRAINE: Status: ACTIVE | Noted: 2023-07-05

## 2023-07-05 PROBLEM — I87.1 SVC SYNDROME: Status: ACTIVE | Noted: 2023-07-05

## 2023-07-05 PROBLEM — R00.0 SINUS TACHYCARDIA: Status: ACTIVE | Noted: 2023-02-01

## 2023-07-05 PROBLEM — R06.89 DIFFICULTY BREATHING: Status: ACTIVE | Noted: 2023-07-05

## 2023-07-05 PROBLEM — T82.868A THROMBOSIS DUE TO CENTRAL VENOUS ACCESS DEVICE (HCC): Status: ACTIVE | Noted: 2022-10-27

## 2023-07-05 PROBLEM — R79.89 ELEVATED TROPONIN LEVEL NOT DUE TO ACUTE CORONARY SYNDROME: Status: ACTIVE | Noted: 2023-07-05

## 2023-07-05 PROBLEM — F98.8 ADD (ATTENTION DEFICIT DISORDER): Status: ACTIVE | Noted: 2023-07-05

## 2023-07-05 PROBLEM — J45.909 UNSPECIFIED ASTHMA, UNCOMPLICATED: Status: RESOLVED | Noted: 2022-08-15 | Resolved: 2023-07-05

## 2023-07-05 PROBLEM — R77.8 ELEVATED TROPONIN LEVEL NOT DUE TO ACUTE CORONARY SYNDROME: Status: ACTIVE | Noted: 2023-07-05

## 2023-07-11 DIAGNOSIS — L30.9 DERMATITIS: Primary | ICD-10-CM

## 2023-07-18 RX ORDER — PANTOPRAZOLE SODIUM 20 MG/1
40 TABLET, DELAYED RELEASE ORAL DAILY
Qty: 60 TABLET | Refills: 0 | Status: SHIPPED | OUTPATIENT
Start: 2023-07-18

## 2023-08-31 ENCOUNTER — PREP FOR PROCEDURE (OUTPATIENT)
Dept: GASTROENTEROLOGY | Age: 48
End: 2023-08-31

## 2023-09-01 RX ORDER — SODIUM CHLORIDE 9 MG/ML
INJECTION, SOLUTION INTRAVENOUS CONTINUOUS
Status: CANCELLED | OUTPATIENT
Start: 2023-09-01

## 2023-09-01 RX ORDER — SODIUM CHLORIDE 9 MG/ML
INJECTION, SOLUTION INTRAVENOUS PRN
Status: CANCELLED | OUTPATIENT
Start: 2023-09-01

## 2023-09-01 RX ORDER — SODIUM CHLORIDE 0.9 % (FLUSH) 0.9 %
5-40 SYRINGE (ML) INJECTION EVERY 12 HOURS SCHEDULED
Status: CANCELLED | OUTPATIENT
Start: 2023-09-01

## 2023-09-05 ENCOUNTER — ANESTHESIA EVENT (OUTPATIENT)
Dept: ENDOSCOPY | Age: 48
End: 2023-09-05
Payer: COMMERCIAL

## 2023-09-05 ENCOUNTER — ANESTHESIA (OUTPATIENT)
Dept: ENDOSCOPY | Age: 48
End: 2023-09-05
Payer: COMMERCIAL

## 2023-09-05 ENCOUNTER — HOSPITAL ENCOUNTER (OUTPATIENT)
Age: 48
Setting detail: OUTPATIENT SURGERY
Discharge: HOME | End: 2023-09-05
Attending: INTERNAL MEDICINE | Admitting: INTERNAL MEDICINE
Payer: COMMERCIAL

## 2023-09-05 VITALS
DIASTOLIC BLOOD PRESSURE: 61 MMHG | SYSTOLIC BLOOD PRESSURE: 109 MMHG | HEART RATE: 61 BPM | TEMPERATURE: 97.5 F | RESPIRATION RATE: 16 BRPM | OXYGEN SATURATION: 95 % | WEIGHT: 245 LBS | BODY MASS INDEX: 39.37 KG/M2 | HEIGHT: 66 IN

## 2023-09-05 PROBLEM — K57.90 DIVERTICULOSIS: Status: ACTIVE | Noted: 2023-09-05

## 2023-09-05 PROBLEM — Z12.11 SCREEN FOR COLON CANCER: Status: ACTIVE | Noted: 2023-09-05

## 2023-09-05 PROCEDURE — 2580000003 HC RX 258: Performed by: INTERNAL MEDICINE

## 2023-09-05 PROCEDURE — 7100000011 HC PHASE II RECOVERY - ADDTL 15 MIN: Performed by: INTERNAL MEDICINE

## 2023-09-05 PROCEDURE — 3609027000 HC COLONOSCOPY: Performed by: INTERNAL MEDICINE

## 2023-09-05 PROCEDURE — 6370000000 HC RX 637 (ALT 250 FOR IP): Performed by: INTERNAL MEDICINE

## 2023-09-05 PROCEDURE — 6360000002 HC RX W HCPCS: Performed by: REGISTERED NURSE

## 2023-09-05 PROCEDURE — G0121 COLON CA SCRN NOT HI RSK IND: HCPCS | Performed by: INTERNAL MEDICINE

## 2023-09-05 PROCEDURE — 3700000000 HC ANESTHESIA ATTENDED CARE: Performed by: INTERNAL MEDICINE

## 2023-09-05 PROCEDURE — 7100000010 HC PHASE II RECOVERY - FIRST 15 MIN: Performed by: INTERNAL MEDICINE

## 2023-09-05 PROCEDURE — 3700000001 HC ADD 15 MINUTES (ANESTHESIA): Performed by: INTERNAL MEDICINE

## 2023-09-05 PROCEDURE — 2709999900 HC NON-CHARGEABLE SUPPLY: Performed by: INTERNAL MEDICINE

## 2023-09-05 PROCEDURE — 2580000003 HC RX 258

## 2023-09-05 RX ORDER — SODIUM CHLORIDE 9 MG/ML
INJECTION, SOLUTION INTRAVENOUS CONTINUOUS
Status: DISCONTINUED | OUTPATIENT
Start: 2023-09-05 | End: 2023-09-05 | Stop reason: HOSPADM

## 2023-09-05 RX ORDER — MAGNESIUM HYDROXIDE 1200 MG/15ML
LIQUID ORAL PRN
Status: DISCONTINUED | OUTPATIENT
Start: 2023-09-05 | End: 2023-09-05 | Stop reason: ALTCHOICE

## 2023-09-05 RX ORDER — SODIUM CHLORIDE 9 MG/ML
INJECTION, SOLUTION INTRAVENOUS
Status: COMPLETED
Start: 2023-09-05 | End: 2023-09-05

## 2023-09-05 RX ORDER — PROPOFOL 10 MG/ML
INJECTION, EMULSION INTRAVENOUS PRN
Status: DISCONTINUED | OUTPATIENT
Start: 2023-09-05 | End: 2023-09-05 | Stop reason: SDUPTHER

## 2023-09-05 RX ORDER — SODIUM CHLORIDE 0.9 % (FLUSH) 0.9 %
5-40 SYRINGE (ML) INJECTION EVERY 12 HOURS SCHEDULED
Status: DISCONTINUED | OUTPATIENT
Start: 2023-09-05 | End: 2023-09-05 | Stop reason: HOSPADM

## 2023-09-05 RX ORDER — SODIUM CHLORIDE 9 MG/ML
INJECTION, SOLUTION INTRAVENOUS PRN
Status: DISCONTINUED | OUTPATIENT
Start: 2023-09-05 | End: 2023-09-05 | Stop reason: HOSPADM

## 2023-09-05 RX ORDER — SIMETHICONE 20 MG/.3ML
EMULSION ORAL PRN
Status: DISCONTINUED | OUTPATIENT
Start: 2023-09-05 | End: 2023-09-05 | Stop reason: ALTCHOICE

## 2023-09-05 RX ADMIN — PROPOFOL 50 MG: 10 INJECTION, EMULSION INTRAVENOUS at 12:29

## 2023-09-05 RX ADMIN — SODIUM CHLORIDE: 9 INJECTION, SOLUTION INTRAVENOUS at 12:05

## 2023-09-05 RX ADMIN — PROPOFOL 50 MG: 10 INJECTION, EMULSION INTRAVENOUS at 12:24

## 2023-09-05 RX ADMIN — PROPOFOL 60 MG: 10 INJECTION, EMULSION INTRAVENOUS at 12:19

## 2023-09-05 RX ADMIN — PROPOFOL 150 MG: 10 INJECTION, EMULSION INTRAVENOUS at 12:17

## 2023-09-05 ASSESSMENT — PAIN - FUNCTIONAL ASSESSMENT: PAIN_FUNCTIONAL_ASSESSMENT: 0-10

## 2023-09-05 NOTE — ANESTHESIA PRE PROCEDURE
Department of Anesthesiology  Preprocedure Note       Name:  Nemiah Mortimer   Age:  53 y.o.  :  1975                                          MRN:  [id number]         Date:  2023      Surgeon: Melo Quinn):  Ezell Gottron, MD    Procedure: Procedure(s):  COLORECTAL CANCER SCREENING, HIGH RISK    Medications prior to admission:   Prior to Admission medications    Medication Sig Start Date End Date Taking? Authorizing Provider   pantoprazole (PROTONIX) 20 MG tablet TAKE 2 TABLETS BY MOUTH DAILY 23   London Griffith MD   lisdexamfetamine (VYVANSE) 10 MG capsule Take 1 capsule by mouth 2 times daily. 23   Historical Provider, MD   apixaban (ELIQUIS) 5 MG TABS tablet Take 1 tablet by mouth 2 times daily    Historical Provider, MD   Atogepant (QULIPTA) 60 MG TABS Take 1 tablet by mouth daily 23   Historical Provider, MD   ferrous sulfate (FE TABS 325) 325 (65 Fe) MG EC tablet Take 1 tablet by mouth 2 times daily 23   Historical Provider, MD   gabapentin (NEURONTIN) 300 MG capsule Take 4 capsules by mouth 2 times daily for 30 days. 5/15/23 6/14/23  Leydi Cantu MD   metoprolol succinate (TOPROL XL) 100 MG extended release tablet Take 1 tablet by mouth 2 times daily 3/15/23   Historical Provider, MD   folic acid (FOLVITE) 1 MG tablet Take 1 tablet by mouth daily 23   Historical Provider, MD   Thiamine HCl (B-1) 100 MG TABS Take by mouth    Historical Provider, MD   sertraline (ZOLOFT) 25 MG tablet Take 1 tablet by mouth daily    Historical Provider, MD   spironolactone (ALDACTONE) 25 MG tablet Take 1 tablet by mouth daily    Historical Provider, MD   blood glucose test strips (TRUE METRIX BLOOD GLUCOSE TEST) strip Test 2 times a day & as needed for symptoms of irregular blood glucose.  23   Renetta Holland MD   Lancets MISC 1 each by Does not apply route 2 times daily 22   Warren Vital MD   cyclobenzaprine (FLEXERIL) 10 MG tablet Take 1 tablet by mouth 2 times daily

## 2023-09-05 NOTE — ANESTHESIA POSTPROCEDURE EVALUATION
Department of Anesthesiology  Postprocedure Note    Patient: Madyson Keita  MRN: [id number]  YOB: 1975  Date of evaluation: 9/5/2023      Procedure Summary     Date: 09/05/23 Room / Location: [address] [place] [address]    Anesthesia Start: 8594 Anesthesia Stop: 200    Procedure: COLORECTAL CANCER SCREENING, HIGH RISK Diagnosis:       Colon cancer screening      (Colon cancer screening [Z12.11])    Surgeons: Matty Jo MD Responsible Provider: OCTAVIO Osborn CRNA    Anesthesia Type: MAC ASA Status: 3          Anesthesia Type: No value filed.     Sonia Phase I: Sonia Score: 10    Sonia Phase II:        Anesthesia Post Evaluation    Patient location during evaluation: bedside  Patient participation: complete - patient participated  Level of consciousness: awake and awake and alert  Airway patency: patent  Nausea & Vomiting: no nausea and no vomiting  Complications: no  Cardiovascular status: blood pressure returned to baseline and hemodynamically stable  Respiratory status: acceptable  Hydration status: euvolemic  Pain management: adequate

## 2023-09-13 RX ORDER — PANTOPRAZOLE SODIUM 20 MG/1
40 TABLET, DELAYED RELEASE ORAL DAILY
Qty: 60 TABLET | Refills: 0 | Status: SHIPPED | OUTPATIENT
Start: 2023-09-13

## 2023-09-14 LAB
AMPHETAMINE (PRESENCE) IN URINE BY SCREEN METHOD: NORMAL
BARBITURATES PRESENCE IN URINE BY SCREEN METHOD: NORMAL
BENZODIAZEPINE (PRESENCE) IN URINE BY SCREEN METHOD: NORMAL
CANNABINOIDS IN URINE BY SCREEN METHOD: NORMAL
COCAINE (PRESENCE) IN URINE BY SCREEN METHOD: NORMAL
DRUG SCREEN COMMENT URINE: NORMAL
FENTANYL URINE: NORMAL
OPIATES (PRESENCE) IN URINE BY SCREEN METHOD: NORMAL
OXYCODONE (PRESENCE) IN URINE BY SCREEN METHOD: NORMAL
PHENCYCLIDINE (PRESENCE) IN URINE BY SCREEN METHOD: NORMAL

## 2023-09-14 NOTE — H&P
History & Physical Reviewed:   Pregnant/Lactating:  ·  Are You Pregnant no   ·  Are You Currently Breastfeeding no     I have reviewed the History and Physical dated:  25-Mar-2023   History and Physical reviewed and relevant findings noted. Patient examined to review pertinent physical  findings.: No significant changes   Home Medications Reviewed: no changes noted   Allergies Reviewed: no changes noted       ERAS (Enhanced Recovery After Surgery):  ·  ERAS Patient: no     Consent:   COVID-19 Consent:  ·  COVID-19 Risk Consent Surgeon has reviewed key risks related to the risk of joseph COVID-19 and if they contract COVID-19 what the risks are.       Electronic Signatures:  Santiago Floyd)  (Signed 16-Apr-2023 22:14)   Authored: History & Physical Reviewed, ERAS, Consent,  Note Completion      Last Updated: 16-Apr-2023 22:14 by Santiago Floyd)

## 2023-10-05 PROBLEM — Z12.11 SCREEN FOR COLON CANCER: Status: RESOLVED | Noted: 2023-09-05 | Resolved: 2023-10-05

## 2023-11-07 ENCOUNTER — TELEPHONE (OUTPATIENT)
Dept: NEUROLOGY | Facility: CLINIC | Age: 48
End: 2023-11-07
Payer: COMMERCIAL

## 2023-11-07 DIAGNOSIS — F98.8 OTHER SPECIFIED BEHAVIORAL AND EMOTIONAL DISORDERS WITH ONSET USUALLY OCCURRING IN CHILDHOOD AND ADOLESCENCE: ICD-10-CM

## 2023-11-08 ENCOUNTER — PATIENT MESSAGE (OUTPATIENT)
Dept: FAMILY MEDICINE CLINIC | Age: 48
End: 2023-11-08

## 2023-11-08 RX ORDER — LISDEXAMFETAMINE DIMESYLATE 20 MG/1
20 CAPSULE ORAL DAILY
Qty: 30 CAPSULE | Refills: 0 | Status: SHIPPED | OUTPATIENT
Start: 2023-11-08 | End: 2023-12-07 | Stop reason: SDUPTHER

## 2023-11-08 RX ORDER — LISDEXAMFETAMINE DIMESYLATE 20 MG/1
20 CAPSULE ORAL DAILY
COMMUNITY
Start: 2023-10-11 | End: 2023-11-08 | Stop reason: SDUPTHER

## 2023-11-08 NOTE — TELEPHONE ENCOUNTER
Please advise, thank you! Next Appointment is : With Family Medicine (Mariel Roque MD)  12/27/2023 at 9:15 AM

## 2023-11-13 ENCOUNTER — NURSE TRIAGE (OUTPATIENT)
Dept: ADMISSION | Facility: HOSPITAL | Age: 48
End: 2023-11-13
Payer: COMMERCIAL

## 2023-11-13 RX ORDER — PANTOPRAZOLE SODIUM 20 MG/1
40 TABLET, DELAYED RELEASE ORAL DAILY
Qty: 60 TABLET | Refills: 0 | Status: SHIPPED | OUTPATIENT
Start: 2023-11-13

## 2023-11-13 NOTE — TELEPHONE ENCOUNTER
"The patient called in, states that her left knuckles are dark and scaly, they almost look bruised, no pain though. She states that her right knuckles are also starting to look discolored, Her Last chemo was December of last year and her last radiation was in April to left hip. She wants some lab work to possibly r/o deficiencies that could be causing this, message routed to the team       Additional Information   Commented on: Do you have any swelling in your face, tongue, throat, or elsewhere?     No swelling   Commented on: How does the rash look? What color and size is it?     Scaly and dark in color, like a bruise   Commented on: Are you having any of these problems?     Crusty and scaly, no discharge   Commented on: What helps these problems?     Moisturizer helps but does not make the color normal, but helps with the \"scalyness\"    Protocols used: Rash    "

## 2023-11-17 NOTE — TELEPHONE ENCOUNTER
Patient encouraged to reach out to PCP, She states she already has a call to that office, appreciative of the information and relieved to know that it is not oncology or treatment related. Denied further questions or concerns at this time.

## 2023-12-04 ENCOUNTER — OFFICE VISIT (OUTPATIENT)
Dept: CARDIOLOGY | Facility: CLINIC | Age: 48
End: 2023-12-04
Payer: COMMERCIAL

## 2023-12-04 VITALS
HEART RATE: 75 BPM | SYSTOLIC BLOOD PRESSURE: 112 MMHG | OXYGEN SATURATION: 95 % | DIASTOLIC BLOOD PRESSURE: 86 MMHG | RESPIRATION RATE: 14 BRPM | TEMPERATURE: 97.2 F | BODY MASS INDEX: 40.24 KG/M2 | WEIGHT: 249.34 LBS

## 2023-12-04 DIAGNOSIS — R00.2 PALPITATIONS: ICD-10-CM

## 2023-12-04 DIAGNOSIS — Z87.891 FORMER SMOKER: ICD-10-CM

## 2023-12-04 DIAGNOSIS — C83.30 DIFFUSE LARGE B-CELL LYMPHOMA, UNSPECIFIED BODY REGION (MULTI): Primary | ICD-10-CM

## 2023-12-04 DIAGNOSIS — I47.11 INAPPROPRIATE SINUS TACHYCARDIA (CMS-HCC): ICD-10-CM

## 2023-12-04 DIAGNOSIS — E78.2 MIXED HYPERLIPIDEMIA: ICD-10-CM

## 2023-12-04 DIAGNOSIS — R94.31 ABNORMAL ECG: ICD-10-CM

## 2023-12-04 DIAGNOSIS — Z92.21 STATUS POST ADMINISTRATION OF CARDIOTOXIC CHEMOTHERAPY: ICD-10-CM

## 2023-12-04 PROBLEM — I87.1 SVC SYNDROME: Status: ACTIVE | Noted: 2023-12-04

## 2023-12-04 PROBLEM — C85.10 B-CELL LYMPHOMA (MULTI): Status: ACTIVE | Noted: 2023-12-04

## 2023-12-04 PROBLEM — E78.5 HYPERLIPIDEMIA: Status: ACTIVE | Noted: 2023-12-04

## 2023-12-04 PROCEDURE — 3008F BODY MASS INDEX DOCD: CPT | Performed by: INTERNAL MEDICINE

## 2023-12-04 PROCEDURE — 1036F TOBACCO NON-USER: CPT | Performed by: INTERNAL MEDICINE

## 2023-12-04 PROCEDURE — 99214 OFFICE O/P EST MOD 30 MIN: CPT | Performed by: INTERNAL MEDICINE

## 2023-12-04 RX ORDER — ACETAMINOPHEN 500 MG
TABLET ORAL
COMMUNITY

## 2023-12-04 RX ORDER — APIXABAN 2.5 MG/1
2.5 TABLET, FILM COATED ORAL 2 TIMES DAILY
COMMUNITY
Start: 2023-11-12 | End: 2024-02-21 | Stop reason: SDUPTHER

## 2023-12-04 RX ORDER — CHOLECALCIFEROL (VITAMIN D3) 50 MCG
1 TABLET ORAL DAILY
COMMUNITY

## 2023-12-04 RX ORDER — GARLIC 500 MG
CAPSULE ORAL
COMMUNITY

## 2023-12-04 RX ORDER — ASCORBIC ACID 500 MG
TABLET ORAL
COMMUNITY

## 2023-12-04 RX ORDER — SERTRALINE HYDROCHLORIDE 25 MG/1
37.5 TABLET, FILM COATED ORAL DAILY
COMMUNITY
Start: 2023-10-30

## 2023-12-04 RX ORDER — CYCLOBENZAPRINE HCL 10 MG
TABLET ORAL
COMMUNITY

## 2023-12-04 RX ORDER — ATOGEPANT 60 MG/1
TABLET ORAL
COMMUNITY
Start: 2023-11-16 | End: 2023-12-19 | Stop reason: SDUPTHER

## 2023-12-04 RX ORDER — GABAPENTIN 300 MG/1
600 CAPSULE ORAL 2 TIMES DAILY
COMMUNITY
End: 2024-01-02 | Stop reason: SDUPTHER

## 2023-12-04 RX ORDER — METOPROLOL SUCCINATE 100 MG/1
100 TABLET, EXTENDED RELEASE ORAL 2 TIMES DAILY
COMMUNITY
Start: 2023-10-26 | End: 2024-01-02

## 2023-12-04 RX ORDER — PANTOPRAZOLE SODIUM 20 MG/1
40 TABLET, DELAYED RELEASE ORAL DAILY
COMMUNITY
Start: 2023-11-13

## 2023-12-04 RX ORDER — SPIRONOLACTONE 25 MG/1
25 TABLET ORAL DAILY
Qty: 90 TABLET | Refills: 3 | Status: SHIPPED | OUTPATIENT
Start: 2023-12-04 | End: 2024-04-26

## 2023-12-04 RX ORDER — LANOLIN ALCOHOL/MO/W.PET/CERES
100 CREAM (GRAM) TOPICAL DAILY
COMMUNITY
Start: 2023-07-17

## 2023-12-04 RX ORDER — FERROUS SULFATE TAB 325 MG (65 MG ELEMENTAL FE) 325 (65 FE) MG
1 TAB ORAL 2 TIMES DAILY
COMMUNITY
Start: 2023-11-12 | End: 2024-06-03 | Stop reason: WASHOUT

## 2023-12-04 RX ORDER — MAGNESIUM HYDROXIDE 400 MG/5ML
1 SUSPENSION, ORAL (FINAL DOSE FORM) ORAL DAILY
COMMUNITY

## 2023-12-04 RX ORDER — FOLIC ACID 1 MG/1
1 TABLET ORAL DAILY
COMMUNITY
Start: 2023-08-08

## 2023-12-04 RX ORDER — CALCIUM CARBONATE 500(1250)
1 TABLET,CHEWABLE ORAL DAILY
COMMUNITY

## 2023-12-04 RX ORDER — MULTIVIT WITH MIN/MFOLATE/K2 340-15/3 G
POWDER (GRAM) ORAL
COMMUNITY

## 2023-12-04 ASSESSMENT — PAIN SCALES - GENERAL: PAINLEVEL: 0-NO PAIN

## 2023-12-04 NOTE — PROGRESS NOTES
Patient:  Sandra Meredith  YOB: 1975  MRN: [id number]       Chief Complaint/Active Symptoms:       Sandra Meredith is a 48 y.o. female who returns today for cardiac follow-up.    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    Cancer Diagnosis: B cell lymphoma, stage IV (DLCBL). Diagnosed 8/2022 with pathologic right arm fracture.   Oncologist: Connie Maier  Treatment: RCHOP - started 9/2022, completed 6 cycles.XRT to left pelvic/gluteal - completed 3/2023 - 30 Gray  Radiation: Radiation therapy to right arm 9/2022 in 10 fractions.     No problems since last seen seen and no longer  on active drug treatment. Has another CT/Staging examination in the next 2 weeks.     Has an occasional fluttering sensation. Usually occurs at rest and feels it is more related to anxiety. Lasts for several seconds only and no dizziness or lightheadedness with it. No chest pain or shortness of breath, no edema.     No specific worries or concerns. Echo was reviewed with patient and questions answered.     Review of Systems    Objective:     Vitals:    12/04/23 1500   BP: 112/86   Pulse: 75   Resp: 14   Temp: 36.2 °C (97.2 °F)   SpO2: 95%         Allergies:     No Known Allergies   No known drug allergies    Medications:     Current Outpatient Medications   Medication Instructions    acetaminophen (Tylenol) 500 mg tablet oral    ascorbic acid (Vitamin C) 500 mg tablet oral    calcium carbonate 500 mg calcium (1,250 mg) chewable tablet 1 tablet, oral, Daily    cholecalciferol (Vitamin D-3) 50 MCG (2000 UT) tablet 1 tablet, oral, Daily    cyclobenzaprine (Flexeril) 10 mg tablet oral    Eliquis 2.5 mg, oral, 2 times daily    FeroSuL tablet 1 tablet, oral, 2 times daily    folic acid (FOLVITE) 1 mg, oral, Daily    gabapentin (NEURONTIN) 600 mg, oral, 2 times daily    garlic 500 mg capsule oral    magnesium, amino acid  "chelate, 133 mg tablet 1 tablet, oral, 2 times daily    metoprolol succinate XL (TOPROL-XL) 100 mg, oral, 2 times daily    omega-3-dha-epa-dpa-fish oil (Omega MonoPure) 860-260-1,300 mg capsule,delayed release(DR/EC) oral    pantoprazole (PROTONIX) 40 mg, oral, Daily    potassium gluconate 595 mg (99 mg) tablet 1 tablet, oral, Daily    Qulipta 60 mg tablet tablet take 1 (ONE) tablet once a day    sertraline (ZOLOFT) 37.5 mg, oral, Daily    spironolactone (ALDACTONE) 25 mg, oral, Daily    thiamine (VITAMIN B-1) 100 mg, oral, Daily    Vyvanse 20 mg, oral, Daily       Physical Examination:   GENERAL:  Well developed, well nourished, in no acute distress.  HEENT: NC AT, EOMI with anicteric sclera  NECK:  Supple, no JVD, no bruit.  CHEST:  Symmetric and nontender.  LUNGS:  Clear to auscultation bilaterally, normal respiratory effort.  HEART: PMI is nonpalpable.  There is a regular rhythm with a normal S1 and S2 there is no appreciable murmur gallop or rub   ABDOMEN: Soft, NT, ND without palpable organomegaly or bruits  EXTREMITIES:  Warm with good color, no clubbing or cyanosis.  There is no edema noted.  PERIPHERAL VASCULAR:  Pulses present and equally palpable; 2+ throughout.  MUSCULOSKELETAL: Mild changes and osteoarthritis  NEURO/PSYCH:  Alert and oriented times three with approppriate behavior and responses. Nonfocal motor examination with normal gait and ambulation    Lab:     CBC:   Lab Results   Component Value Date    WBC 6.3 09/14/2023    RBC 5.50 (H) 09/14/2023    HGB 14.4 09/14/2023    HCT 43.4 09/14/2023     09/14/2023        CMP:    Lab Results   Component Value Date     09/14/2023    K 4.2 09/14/2023     09/14/2023    CO2 26 09/14/2023    BUN 14 09/14/2023    CREATININE 0.83 09/14/2023    GLUCOSE 105 (H) 09/14/2023    CALCIUM 9.3 09/14/2023       Magnesium:    Lab Results   Component Value Date    MG 1.97 08/23/2023       Lipid Profile:    No results found for: \"CHLPL\", \"TRIG\", \"HDL\", " "\"LDLCALC\", \"LDLDIRECT\"    TSH:    Lab Results   Component Value Date    TSH 1.88 03/02/2023       BNP:   Lab Results   Component Value Date    BNP 26 02/20/2023        PT/INR:    Lab Results   Component Value Date    PROTIME 10.7 04/18/2023    INR 0.9 04/18/2023       HgBA1c:    No results found for: \"HGBA1C\"    BMP:  Lab Results   Component Value Date     09/14/2023     08/23/2023     08/16/2023    K 4.2 09/14/2023    K 4.1 08/23/2023    K 4.2 08/16/2023     09/14/2023    CL 99 08/23/2023     08/16/2023    CO2 26 09/14/2023    CO2 24 08/23/2023    CO2 28 08/16/2023    BUN 14 09/14/2023    BUN 16 08/23/2023    BUN 16 08/16/2023    CREATININE 0.83 09/14/2023    CREATININE 0.85 08/23/2023    CREATININE 0.80 08/16/2023       Cardiac Enzymes:    Lab Results   Component Value Date    TROPHS 20 02/20/2023    TROPHS 15 02/20/2023    TROPHS 101 (H) 02/03/2023       Hepatic Function Panel:    Lab Results   Component Value Date    ALKPHOS 98 09/14/2023    ALT 27 09/14/2023    AST 19 09/14/2023    PROT 7.5 09/14/2023    BILITOT 0.5 09/14/2023    BILIDIR CANCELED 03/01/2023         Diagnostic Studies:     No echocardiogram results found for the past 12 months    No nuclear medicine results found for the past 12 months    No valid procedures specified.    EKG:   No results found for: \"EKG\"    Radiology:     No orders to display         ASSESSMENT     Problem List Items Addressed This Visit       Abnormal ECG    Relevant Medications    spironolactone (Aldactone) 25 mg tablet    B-cell lymphoma (CMS/HCC) - Primary    Hyperlipidemia    Status post administration of cardiotoxic chemotherapy    Relevant Medications    spironolactone (Aldactone) 25 mg tablet    Other Relevant Orders    Follow Up In Cardiology    Palpitations    Inappropriate sinus tachycardia    Relevant Medications    metoprolol succinate XL (Toprol-XL) 100 mg 24 hr tablet    Former smoker       PLAN     1.  Diffuse large B-cell lymphoma " with follicular lymphoma.  Patient has undergone treatment and is now in remission.  At this point in time there is no signs of any significant left ventricular dysfunction from her cardiotoxic drug therapy.  Would recommend that she follow-up in a years time and we will get an echo prior to that appointment.  2.  Palpitations and inappropriate sinus tachycardia.  She is very well-controlled on her current beta-blocker medications.  As she is slowly increasing her activity we have asked her to keep track and we can certainly adjust her medications as needed.  3.  Hyperlipidemia.  Continue conservative medical management for statin therapy.  4.  Tobacco and weight status.  The patient is now tobacco free but remains morbidly obese.  We have encouraged heart healthy Mediterranean diet.    Patient follow-up in a years time we have requested a limited echo or will prior to that appointment she knows that if she has any problems or concerns she should return for an earlier evaluation and if there is any sign of any recurrence of her lymphoma and therapy is restarted we would like to see her and assess her before that if feasible.  Voices understanding of these recommendations.

## 2023-12-06 ENCOUNTER — APPOINTMENT (OUTPATIENT)
Dept: CARDIOLOGY | Facility: CLINIC | Age: 48
End: 2023-12-06
Payer: COMMERCIAL

## 2023-12-06 ENCOUNTER — OFFICE VISIT (OUTPATIENT)
Dept: FAMILY MEDICINE CLINIC | Age: 48
End: 2023-12-06
Payer: COMMERCIAL

## 2023-12-06 VITALS
HEIGHT: 66 IN | BODY MASS INDEX: 40.02 KG/M2 | OXYGEN SATURATION: 96 % | SYSTOLIC BLOOD PRESSURE: 116 MMHG | DIASTOLIC BLOOD PRESSURE: 78 MMHG | HEART RATE: 71 BPM | TEMPERATURE: 97.5 F | WEIGHT: 249 LBS

## 2023-12-06 DIAGNOSIS — C85.10 B-CELL LYMPHOMA, UNSPECIFIED B-CELL LYMPHOMA TYPE, UNSPECIFIED BODY REGION (HCC): Primary | ICD-10-CM

## 2023-12-06 DIAGNOSIS — Z13.220 LIPID SCREENING: ICD-10-CM

## 2023-12-06 DIAGNOSIS — E11.9 TYPE 2 DIABETES MELLITUS WITHOUT COMPLICATION, UNSPECIFIED WHETHER LONG TERM INSULIN USE (HCC): ICD-10-CM

## 2023-12-06 DIAGNOSIS — E66.01 CLASS 3 SEVERE OBESITY WITH SERIOUS COMORBIDITY IN ADULT, UNSPECIFIED BMI, UNSPECIFIED OBESITY TYPE (HCC): ICD-10-CM

## 2023-12-06 DIAGNOSIS — F32.A ANXIETY AND DEPRESSION: ICD-10-CM

## 2023-12-06 DIAGNOSIS — N91.2 AMENORRHEA: ICD-10-CM

## 2023-12-06 DIAGNOSIS — L30.9 DERMATITIS: ICD-10-CM

## 2023-12-06 DIAGNOSIS — K21.9 GASTROESOPHAGEAL REFLUX DISEASE, UNSPECIFIED WHETHER ESOPHAGITIS PRESENT: ICD-10-CM

## 2023-12-06 DIAGNOSIS — F41.9 ANXIETY AND DEPRESSION: ICD-10-CM

## 2023-12-06 LAB — HBA1C MFR BLD: 6.6 % (ref 4.8–5.9)

## 2023-12-06 PROCEDURE — 99214 OFFICE O/P EST MOD 30 MIN: CPT | Performed by: PHYSICIAN ASSISTANT

## 2023-12-06 PROCEDURE — 3044F HG A1C LEVEL LT 7.0%: CPT | Performed by: PHYSICIAN ASSISTANT

## 2023-12-06 RX ORDER — FAMOTIDINE 20 MG/1
20 TABLET, FILM COATED ORAL 2 TIMES DAILY
Qty: 60 TABLET | Refills: 3 | Status: SHIPPED | OUTPATIENT
Start: 2023-12-06

## 2023-12-06 NOTE — PROGRESS NOTES
Subjective  Noelle Pacheco, 53 y.o. female presents today with:  Chief Complaint   Patient presents with    Hand Problem     Patient presents today with bruises on her knuckles. Patient would like to discuss medication for hormones. HPI  Pt is here for consultation  - has several concerns  Pmh-significant for B-cell lymphoma SVC syndrome sinus tachycardia stress disorder anxiety  She is followed by psych who has increased her Zoloft to 37.5 mg daily and Vyvanse to improve concentration which has helped  However she is interested in more holistic therapies moving forward and would like to eliminate some of her meds  Cardiology through [facility] has discontinued spironolactone for daily use directed to resume as needed  Complains of chronic reflux without use of Protonix however she has reduced to 20 mg daily  She is interested in more holistic regimens for chronic concerns and would like to discuss use K2, curcumin and reishi mushroom mix to help with inflammation  She complains of weight gain last menstrual period a year ago-she would like to know if she is postmenopausal as she often becomes  amenorrheic as her weight increases and regain her menses with weight loss  Also reports discoloration and swelling along MTP joints of her left hand 2-month admits to doing painting and possible exposure to chemicals she thought it may have been related to anemia so she increased her red meat consumption and sxs have resolved.   She is inquiring as to what could have been the source for her symptoms  Review of Systems      Past Medical History:   Diagnosis Date    Diffuse large B cell lymphoma ([address])     History of therapeutic radiation     HLD (hyperlipidemia)     Hx antineoplastic chemo     Migraine with aura and without status migrainosus, not intractable     Type 2 diabetes mellitus ([address]) 11/2022     Past Surgical History:   Procedure Laterality Date    COLONOSCOPY N/A 9/5/2023    COLORECTAL CANCER SCREENING, HIGH RISK

## 2023-12-07 ENCOUNTER — TELEPHONE (OUTPATIENT)
Dept: NEUROLOGY | Facility: CLINIC | Age: 48
End: 2023-12-07
Payer: COMMERCIAL

## 2023-12-07 DIAGNOSIS — F98.8 OTHER SPECIFIED BEHAVIORAL AND EMOTIONAL DISORDERS WITH ONSET USUALLY OCCURRING IN CHILDHOOD AND ADOLESCENCE: ICD-10-CM

## 2023-12-07 LAB
ESTRADIOL LEVEL: <5 PG/ML
FOLLICLE STIMULATING HORMONE: 84.2 MIU/ML
LH: 48 MIU/ML (ref 1.7–8.6)

## 2023-12-07 RX ORDER — LISDEXAMFETAMINE DIMESYLATE 20 MG/1
20 CAPSULE ORAL DAILY
Qty: 30 CAPSULE | Refills: 0 | Status: SHIPPED | OUTPATIENT
Start: 2023-12-07 | End: 2024-01-02 | Stop reason: SDUPTHER

## 2023-12-07 NOTE — TELEPHONE ENCOUNTER
Called to refill Vyvanse 20 mg capsule , Qty of 30.  Pharm[place] [facility] [place] - [address] [address]

## 2023-12-13 ENCOUNTER — HOSPITAL ENCOUNTER (OUTPATIENT)
Dept: RADIOLOGY | Facility: HOSPITAL | Age: 48
Discharge: HOME | End: 2023-12-13
Payer: COMMERCIAL

## 2023-12-13 DIAGNOSIS — C85.10 UNSPECIFIED B-CELL LYMPHOMA, UNSPECIFIED SITE (MULTI): ICD-10-CM

## 2023-12-13 PROCEDURE — 2550000001 HC RX 255 CONTRASTS: Performed by: INTERNAL MEDICINE

## 2023-12-13 PROCEDURE — 71260 CT THORAX DX C+: CPT | Performed by: STUDENT IN AN ORGANIZED HEALTH CARE EDUCATION/TRAINING PROGRAM

## 2023-12-13 PROCEDURE — 74177 CT ABD & PELVIS W/CONTRAST: CPT | Performed by: STUDENT IN AN ORGANIZED HEALTH CARE EDUCATION/TRAINING PROGRAM

## 2023-12-13 PROCEDURE — 71260 CT THORAX DX C+: CPT

## 2023-12-13 RX ADMIN — IOHEXOL 75 ML: 350 INJECTION, SOLUTION INTRAVENOUS at 11:00

## 2023-12-19 DIAGNOSIS — G43.711 CHRONIC MIGRAINE WITHOUT AURA, INTRACTABLE, WITH STATUS MIGRAINOSUS: Primary | ICD-10-CM

## 2023-12-19 RX ORDER — ATOGEPANT 60 MG/1
60 TABLET ORAL DAILY
Qty: 30 TABLET | Refills: 6 | Status: SHIPPED | OUTPATIENT
Start: 2023-12-19 | End: 2024-04-26

## 2023-12-20 ENCOUNTER — LAB (OUTPATIENT)
Dept: LAB | Facility: HOSPITAL | Age: 48
End: 2023-12-20
Payer: COMMERCIAL

## 2023-12-20 ENCOUNTER — OFFICE VISIT (OUTPATIENT)
Dept: HEMATOLOGY/ONCOLOGY | Facility: HOSPITAL | Age: 48
End: 2023-12-20
Payer: COMMERCIAL

## 2023-12-20 VITALS
WEIGHT: 250.5 LBS | HEIGHT: 66 IN | HEART RATE: 75 BPM | RESPIRATION RATE: 17 BRPM | SYSTOLIC BLOOD PRESSURE: 119 MMHG | DIASTOLIC BLOOD PRESSURE: 61 MMHG | OXYGEN SATURATION: 99 % | BODY MASS INDEX: 40.26 KG/M2 | TEMPERATURE: 97.3 F

## 2023-12-20 DIAGNOSIS — Z92.21 STATUS POST ADMINISTRATION OF CARDIOTOXIC CHEMOTHERAPY: ICD-10-CM

## 2023-12-20 DIAGNOSIS — I87.1 SVC SYNDROME: ICD-10-CM

## 2023-12-20 DIAGNOSIS — C83.30 DIFFUSE LARGE B-CELL LYMPHOMA, UNSPECIFIED BODY REGION (MULTI): Primary | ICD-10-CM

## 2023-12-20 DIAGNOSIS — C83.30 DIFFUSE LARGE B-CELL LYMPHOMA, UNSPECIFIED BODY REGION (MULTI): ICD-10-CM

## 2023-12-20 LAB
ALBUMIN SERPL BCP-MCNC: 4.5 G/DL (ref 3.4–5)
ALP SERPL-CCNC: 91 U/L (ref 33–110)
ALT SERPL W P-5'-P-CCNC: 27 U/L (ref 7–45)
ANION GAP SERPL CALC-SCNC: 15 MMOL/L (ref 10–20)
AST SERPL W P-5'-P-CCNC: 19 U/L (ref 9–39)
BASOPHILS # BLD AUTO: 0.04 X10*3/UL (ref 0–0.1)
BASOPHILS NFR BLD AUTO: 0.6 %
BILIRUB SERPL-MCNC: 0.5 MG/DL (ref 0–1.2)
BUN SERPL-MCNC: 15 MG/DL (ref 6–23)
CALCIUM SERPL-MCNC: 9.5 MG/DL (ref 8.6–10.3)
CHLORIDE SERPL-SCNC: 103 MMOL/L (ref 98–107)
CO2 SERPL-SCNC: 26 MMOL/L (ref 21–32)
CREAT SERPL-MCNC: 0.87 MG/DL (ref 0.5–1.05)
EOSINOPHIL # BLD AUTO: 0.08 X10*3/UL (ref 0–0.7)
EOSINOPHIL NFR BLD AUTO: 1.2 %
ERYTHROCYTE [DISTWIDTH] IN BLOOD BY AUTOMATED COUNT: 14 % (ref 11.5–14.5)
ERYTHROCYTE [SEDIMENTATION RATE] IN BLOOD BY WESTERGREN METHOD: 42 MM/H (ref 0–20)
GFR SERPL CREATININE-BSD FRML MDRD: 83 ML/MIN/1.73M*2
GLUCOSE SERPL-MCNC: 130 MG/DL (ref 74–99)
HCT VFR BLD AUTO: 43.8 % (ref 36–46)
HGB BLD-MCNC: 14.6 G/DL (ref 12–16)
IMM GRANULOCYTES # BLD AUTO: 0.02 X10*3/UL (ref 0–0.7)
IMM GRANULOCYTES NFR BLD AUTO: 0.3 % (ref 0–0.9)
LDH SERPL L TO P-CCNC: 194 U/L (ref 84–246)
LYMPHOCYTES # BLD AUTO: 1.38 X10*3/UL (ref 1.2–4.8)
LYMPHOCYTES NFR BLD AUTO: 20.1 %
MCH RBC QN AUTO: 27 PG (ref 26–34)
MCHC RBC AUTO-ENTMCNC: 33.3 G/DL (ref 32–36)
MCV RBC AUTO: 81 FL (ref 80–100)
MONOCYTES # BLD AUTO: 0.53 X10*3/UL (ref 0.1–1)
MONOCYTES NFR BLD AUTO: 7.7 %
NEUTROPHILS # BLD AUTO: 4.83 X10*3/UL (ref 1.2–7.7)
NEUTROPHILS NFR BLD AUTO: 70.1 %
NRBC BLD-RTO: 0 /100 WBCS (ref 0–0)
PLATELET # BLD AUTO: 319 X10*3/UL (ref 150–450)
POTASSIUM SERPL-SCNC: 4.3 MMOL/L (ref 3.5–5.3)
PROT SERPL-MCNC: 7.3 G/DL (ref 6.4–8.2)
RBC # BLD AUTO: 5.41 X10*6/UL (ref 4–5.2)
SODIUM SERPL-SCNC: 140 MMOL/L (ref 136–145)
WBC # BLD AUTO: 6.9 X10*3/UL (ref 4.4–11.3)

## 2023-12-20 PROCEDURE — 83615 LACTATE (LD) (LDH) ENZYME: CPT

## 2023-12-20 PROCEDURE — 99214 OFFICE O/P EST MOD 30 MIN: CPT | Performed by: INTERNAL MEDICINE

## 2023-12-20 PROCEDURE — 3008F BODY MASS INDEX DOCD: CPT | Performed by: INTERNAL MEDICINE

## 2023-12-20 PROCEDURE — 85025 COMPLETE CBC W/AUTO DIFF WBC: CPT

## 2023-12-20 PROCEDURE — 36415 COLL VENOUS BLD VENIPUNCTURE: CPT

## 2023-12-20 PROCEDURE — 84075 ASSAY ALKALINE PHOSPHATASE: CPT

## 2023-12-20 PROCEDURE — 1036F TOBACCO NON-USER: CPT | Performed by: INTERNAL MEDICINE

## 2023-12-20 PROCEDURE — 85652 RBC SED RATE AUTOMATED: CPT

## 2023-12-20 ASSESSMENT — PAIN SCALES - GENERAL: PAINLEVEL: 1

## 2023-12-20 ASSESSMENT — ENCOUNTER SYMPTOMS
LOSS OF SENSATION IN FEET: 0
OCCASIONAL FEELINGS OF UNSTEADINESS: 0

## 2023-12-27 NOTE — PROGRESS NOTES
"Patient ID: Sandra Meredith is a 48 y.o. female.  Oncologic history:  Lymphoma, diagnosed in August 2022 after a fall and a pathological right humeral fracture at the end of July. Reports right arm \"deep bone pain\" for several months  prior. S/p right arm open reduction internal fixation in August.   13 x 3 x 4 cm mass involving the mid and proximal humeral diaphysis   Not double expresser, no MYC translocation, final specification diffuse large B-cell lymphoma NOS.   Ki-67 80%  r-IPI score of 2, considered favorable, with 80% projected 4 years BFS.   As primary bone lymphoma, stage, and age appears most prognostic.     PET scan 11 August 2022 showed uptake in the right humerus, left posterior pleura, left iliac bone and left external iliac lymph nodes.      Received first cycle R-CHOP on 2 Sept 2022.  Right arm radiation September 12 to September 23, 2022 in 10 fractions.  We do not recommend further radiation to any other sites of disease  Cycle 2 R-CHOP given on September 23, 2022  -Restaging PET scan denied by insurance.  Planning CT chest abdomen pelvis after cycle 3 of chemotherapy  -Cycle 3 R-CHOP on 10/14/2022  -Cycle 4 R-CHOP given 11/4/2022.  Tolerating well, asymptomatic.  Left chest wall pain resolved.  CT scan of the -Chest abdomen pelvis November 8, 2022 showed sclerotic bone changes but could not elucidate the bone disease accurately, L4 lytic area under  observation, no intra-abdominal disease, no colitis colitis, check radiculitis.     -Evaluation 11/23/2022 tolerating well, epigastric discomfort intermittent chronic and stable.  On PPI.  Occasional loose stools sending C. difficile and enteric pathogens.  CT showed abundance of stools in the colon suggest Metamucil to regulate her  bowel movement.  She is taking probiotics as well.     -Cycle 5 R-CHOP November 25, 2022  -Cycle 6 R-CHOP 12/16/2022  -End of therapy PET scan shows a CR except for a [place] 4 residual small focal activity in the left " gluteus muscle at site of prior lymphoma mass.  -Discussion at tumor board January 2023, plan to attempt at biopsy of residual activity in the left gluteus muscle area under CT or ultrasound guidance  -Evaluation January 18, 2023: Increased dyspnea on minimal exertion, CTA negative for PE, concerned about filling defect around the catheter tip, not mentioned in the report, could be artifact and flow effect, recommended that patient go to the emergency  room for venous Doppler of the bilateral IJ and subclavian veins.  -Admitted mid-February 2023 with progressive SVC syndrome, on anticoagulation, enoxaparin, to keep for now additional 2 to 3 weeks and then transition to apixaban.  Mild elevation in anticardiolipin and beta-2 glycoprotein 1 antibody, repeat.  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  Planning radiation therapy, starting 3/2/2023 in [place].  -Plan evaluation for possible CAR-T therapy.  Other options include observation or rituximab maintenance as we would normally do in treating [place] patients after 6 cycles of R-CHOP.  -Radiation therapy to residual activity left pelvic/gluteus area completed March 22, 2023, 30 Marroquin delivered.  -Planned for SVC thrombectomy by interventional radiology the week of March 27, 2023.  -Evaluation March 22, 2023, doing well, rash secondary to enoxaparin, switch to apixaban, plan to evaluate mid May 2023 with PET scan.  Patient is about 3 months out from the end of her chemoimmunotherapy.  -Plan to be evaluated April 2023 by Dr. Grover for consideration of CAR-T consolidation.  -PET scan April 14 2023 no residual lymphoma.   -Met with Dr. Grover, decided against CAR-T and to watch expectantly at this time.  -CT scan chest abdomen pelvis on August 16, 2023 no evidence of disease  -Evaluation 8/26/2023: Asymptomatic no evidence of disease  "clinically radiographically or by labs.  Plan to see back in 4 months with CBC CMP LDH CT chest abdomen pelvis and ESR.  Plan to talk over the phone in 1-3 to 4 weeks after a D-dimer and echocardiogram  and to discuss option of reducing apixaban to 2.5 mg p.o. twice daily to finish a year of anticoagulation before final discontinuation.   -Phone visit 9/20/2023, reviewed D-dimer, normal, instructed to reduce apixaban to 2.5 mg p.o. twice daily for another 6 months through March 2024.  -Evaluation December 20, 2023: 1 year since end of therapy in December 2023, doing well, recent imaging December 13, 2023 shows no evidence of disease stable sclerotic bone changes.  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 twice daily September 2023, plan to discontinue and switch to 162 mg of aspirin March 2024.  Iron deficiency, no documented bleeding, following with GI.  To discuss with patient GI follow-up  Subjective    HPI  Feels well overall.Occasional left groin ache that self-limiting and nonprogressive.  She had a recent cold that she self medicated herself with ciprofloxacin.  She had low iron stores in the past which is being repeated.  Denies any dyspnea cough abdominal pain nausea vomiting fever chills night sweats or edema of the legs.  No self-palpated adenopathy    Review of Systems    Head and neck: Other than HPI negative  CNS: Other than HPI negative  Cardiovascular: Other than HPI negative  Pulmonary: Other than HPI negative  GI: Other than HPI negative  : Other than HPI negative  Bleeding: Other than HPI negative  Constitutional: Other than HPI negative     Objective    BSA: 2.3 meters squared  /61   Pulse 75   Temp 36.3 °C (97.3 °F) (Skin)   Resp 17   Ht (S) 1.665 m (5' 5.55\")   Wt 114 kg (250 lb 8 oz)   SpO2 99%   BMI 40.99 kg/m²      Physical Exam  Alert oriented not in distress not pale or " jaundiced  Elderly frail, needed help to move to exam table.   Lymph nodes: No adenopathy  Oral mucosa negative, no mucositis  Head is normocephalic atraumatic, pupils equal reactive  Neck is supple no adenopathy, no thyromegaly  Lungs show equal air entry, no crackles or wheezing, equal percussion  Heart shows regular rate and rhythm normal S1-S2 no murmurs or gallop rhythm  Abdomen is soft nontender, moves with respiration, no hepatosplenomegaly or masses, positive bowel sounds, not distended.  Lower extremities show no edema  Neurologically grossly nonfocal  Psych: Normal affect    Performance Status:  Asymptomatic      Assessment/Plan   Predominantly primary bone DLBCL, stage IV disease  r-IPI score of 2, considered favorable, with 80% projected 4 years PFS.   As primary bone lymphoma, stage, and age appears most prognostic.  Completed chemoimmunotherapy December 2022, patient is 1 year out at this time without evidence of disease.    Comorbidities: SVC syndrome currently anticoagulated.  Apixaban was reduced to 2.5 mg p.o. twice daily September 2023 and will be discontinued March 2024 and he was switched at the time to 162 mg aspirin.  Iron deficiency will need to check her iron stores again.    Will see patient back with imaging in 6 months June 2024.  Cancer Staging   No matching staging information was found for the patient.    Recent Results (from the past 672 hour(s))   Lactate Dehydrogenase    Collection Time: 12/20/23  9:15 AM   Result Value Ref Range     84 - 246 U/L   Comprehensive Metabolic Panel    Collection Time: 12/20/23  9:15 AM   Result Value Ref Range    Glucose 130 (H) 74 - 99 mg/dL    Sodium 140 136 - 145 mmol/L    Potassium 4.3 3.5 - 5.3 mmol/L    Chloride 103 98 - 107 mmol/L    Bicarbonate 26 21 - 32 mmol/L    Anion Gap 15 10 - 20 mmol/L    Urea Nitrogen 15 6 - 23 mg/dL    Creatinine 0.87 0.50 - 1.05 mg/dL    eGFR 83 >60 mL/min/1.73m*2    Calcium 9.5 8.6 - 10.3 mg/dL    Albumin 4.5  3.4 - 5.0 g/dL    Alkaline Phosphatase 91 33 - 110 U/L    Total Protein 7.3 6.4 - 8.2 g/dL    AST 19 9 - 39 U/L    Bilirubin, Total 0.5 0.0 - 1.2 mg/dL    ALT 27 7 - 45 U/L   CBC and Auto Differential    Collection Time: 12/20/23  9:15 AM   Result Value Ref Range    WBC 6.9 4.4 - 11.3 x10*3/uL    nRBC 0.0 0.0 - 0.0 /100 WBCs    RBC 5.41 (H) 4.00 - 5.20 x10*6/uL    Hemoglobin 14.6 12.0 - 16.0 g/dL    Hematocrit 43.8 36.0 - 46.0 %    MCV 81 80 - 100 fL    MCH 27.0 26.0 - 34.0 pg    MCHC 33.3 32.0 - 36.0 g/dL    RDW 14.0 11.5 - 14.5 %    Platelets 319 150 - 450 x10*3/uL    Neutrophils % 70.1 40.0 - 80.0 %    Immature Granulocytes %, Automated 0.3 0.0 - 0.9 %    Lymphocytes % 20.1 13.0 - 44.0 %    Monocytes % 7.7 2.0 - 10.0 %    Eosinophils % 1.2 0.0 - 6.0 %    Basophils % 0.6 0.0 - 2.0 %    Neutrophils Absolute 4.83 1.20 - 7.70 x10*3/uL    Immature Granulocytes Absolute, Automated 0.02 0.00 - 0.70 x10*3/uL    Lymphocytes Absolute 1.38 1.20 - 4.80 x10*3/uL    Monocytes Absolute 0.53 0.10 - 1.00 x10*3/uL    Eosinophils Absolute 0.08 0.00 - 0.70 x10*3/uL    Basophils Absolute 0.04 0.00 - 0.10 x10*3/uL   Sedimentation Rate    Collection Time: 12/20/23  9:15 AM   Result Value Ref Range    Sedimentation Rate 42 (H) 0 - 20 mm/h         Diagnoses and all orders for this visit:  Diffuse large B-cell lymphoma, unspecified body region (CMS/HCC)  -     Lactate Dehydrogenase; Future  -     Comprehensive Metabolic Panel; Future  -     CBC and Auto Differential; Future  -     Iron and TIBC; Future  -     Ferritin; Future  -     Sedimentation Rate; Future  -     Lactate Dehydrogenase; Future  -     Comprehensive Metabolic Panel; Future  -     CBC and Auto Differential; Future  -     [place]/Lambda Free Light Chain, Serum; Future  -     Immunoglobulins (IgG, IgA, IgM); Future  -     Serum Protein Electrophoresis + Immunofixation; Future  -     Beta 2 Microglobuin; Future  -     Sedimentation Rate; Future  -     CT chest abdomen pelvis w  IV contrast; Future  -     Clinic Appointment Request Follow up; ABENA ROSA; Future  SVC syndrome  Status post administration of cardiotoxic chemotherapy         Abena Rosa MD

## 2024-01-02 ENCOUNTER — TELEPHONE (OUTPATIENT)
Dept: NEUROLOGY | Facility: CLINIC | Age: 49
End: 2024-01-02
Payer: COMMERCIAL

## 2024-01-02 DIAGNOSIS — G43.909 MIGRAINE WITHOUT STATUS MIGRAINOSUS, NOT INTRACTABLE, UNSPECIFIED MIGRAINE TYPE: ICD-10-CM

## 2024-01-02 DIAGNOSIS — F98.8 OTHER SPECIFIED BEHAVIORAL AND EMOTIONAL DISORDERS WITH ONSET USUALLY OCCURRING IN CHILDHOOD AND ADOLESCENCE: ICD-10-CM

## 2024-01-02 DIAGNOSIS — R00.0 TACHYCARDIA, UNSPECIFIED: ICD-10-CM

## 2024-01-02 RX ORDER — GABAPENTIN 300 MG/1
600 CAPSULE ORAL 2 TIMES DAILY
Qty: 120 CAPSULE | Refills: 3 | Status: SHIPPED | OUTPATIENT
Start: 2024-01-02 | End: 2024-04-26

## 2024-01-02 RX ORDER — METOPROLOL SUCCINATE 100 MG/1
100 TABLET, EXTENDED RELEASE ORAL 2 TIMES DAILY
Qty: 180 TABLET | Refills: 3 | Status: SHIPPED | OUTPATIENT
Start: 2024-01-02 | End: 2024-04-26

## 2024-01-02 RX ORDER — LISDEXAMFETAMINE DIMESYLATE 20 MG/1
20 CAPSULE ORAL DAILY
Qty: 30 CAPSULE | Refills: 0 | Status: SHIPPED | OUTPATIENT
Start: 2024-01-02 | End: 2024-01-10

## 2024-01-02 NOTE — TELEPHONE ENCOUNTER
Called to refill gabapentin (Neurontin) 300 mg capsule and Vyvanse 20 mg capsule.  Pharmacy is   [id number] [place], [place]

## 2024-01-09 ENCOUNTER — TELEPHONE (OUTPATIENT)
Dept: NEUROLOGY | Facility: CLINIC | Age: 49
End: 2024-01-09
Payer: COMMERCIAL

## 2024-01-09 DIAGNOSIS — F98.8 OTHER SPECIFIED BEHAVIORAL AND EMOTIONAL DISORDERS WITH ONSET USUALLY OCCURRING IN CHILDHOOD AND ADOLESCENCE: ICD-10-CM

## 2024-01-09 NOTE — TELEPHONE ENCOUNTER
Called to ask if her refill can be for the Generic in stead of the Name brand for Vyvanse 20 mg capsule.  She found a new Pharmacy that stocks the Generic.  [id number] - [address].  Is this possible to do?

## 2024-01-10 RX ORDER — LISDEXAMFETAMINE DIMESYLATE CAPSULES 20 MG/1
20 CAPSULE ORAL EVERY MORNING
Qty: 30 CAPSULE | Refills: 0 | Status: SHIPPED | OUTPATIENT
Start: 2024-01-10 | End: 2024-02-06 | Stop reason: SDUPTHER

## 2024-01-10 NOTE — TELEPHONE ENCOUNTER
Checked with Sandra and she did not  Vyvanse sent 1-2-2024 due to cost. Consulted [facility] and also did not indicate medication fill.   Discontinued this Vyvanse and will order generic from [facility] for her

## 2024-02-05 DIAGNOSIS — F98.8 OTHER SPECIFIED BEHAVIORAL AND EMOTIONAL DISORDERS WITH ONSET USUALLY OCCURRING IN CHILDHOOD AND ADOLESCENCE: ICD-10-CM

## 2024-02-05 RX ORDER — LISDEXAMFETAMINE DIMESYLATE CAPSULES 20 MG/1
20 CAPSULE ORAL EVERY MORNING
Qty: 30 CAPSULE | Refills: 0 | Status: CANCELLED | OUTPATIENT
Start: 2024-02-05

## 2024-02-06 ENCOUNTER — OFFICE VISIT (OUTPATIENT)
Dept: NEUROLOGY | Facility: CLINIC | Age: 49
End: 2024-02-06
Payer: COMMERCIAL

## 2024-02-06 VITALS — HEART RATE: 65 BPM | DIASTOLIC BLOOD PRESSURE: 72 MMHG | TEMPERATURE: 97.3 F | SYSTOLIC BLOOD PRESSURE: 112 MMHG

## 2024-02-06 DIAGNOSIS — R41.840 ATTENTION DEFICIT: ICD-10-CM

## 2024-02-06 DIAGNOSIS — F98.8 OTHER SPECIFIED BEHAVIORAL AND EMOTIONAL DISORDERS WITH ONSET USUALLY OCCURRING IN CHILDHOOD AND ADOLESCENCE: ICD-10-CM

## 2024-02-06 DIAGNOSIS — F07.81 POST-CONCUSSION SYNDROME: Primary | ICD-10-CM

## 2024-02-06 PROCEDURE — 3008F BODY MASS INDEX DOCD: CPT | Performed by: NURSE PRACTITIONER

## 2024-02-06 PROCEDURE — 99214 OFFICE O/P EST MOD 30 MIN: CPT | Performed by: NURSE PRACTITIONER

## 2024-02-06 PROCEDURE — 1036F TOBACCO NON-USER: CPT | Performed by: NURSE PRACTITIONER

## 2024-02-06 RX ORDER — LISDEXAMFETAMINE DIMESYLATE CAPSULES 20 MG/1
20 CAPSULE ORAL EVERY MORNING
Qty: 30 CAPSULE | Refills: 0 | Status: SHIPPED | OUTPATIENT
Start: 2024-03-05 | End: 2024-05-14 | Stop reason: SDUPTHER

## 2024-02-06 RX ORDER — LISDEXAMFETAMINE DIMESYLATE CAPSULES 20 MG/1
20 CAPSULE ORAL EVERY MORNING
Qty: 30 CAPSULE | Refills: 0 | Status: SHIPPED | OUTPATIENT
Start: 2024-04-04 | End: 2024-05-14 | Stop reason: SDUPTHER

## 2024-02-06 RX ORDER — LISDEXAMFETAMINE DIMESYLATE CAPSULES 20 MG/1
20 CAPSULE ORAL EVERY MORNING
Qty: 30 CAPSULE | Refills: 0 | Status: SHIPPED | OUTPATIENT
Start: 2024-02-06 | End: 2024-05-14 | Stop reason: SDUPTHER

## 2024-02-06 NOTE — PROGRESS NOTES
Patient being assessed today for follow-up of postconcussion syndrome and attention deficit.  She reports that she is not having any breakthrough activity with her migraines as long as she takes the gabapentin and Qulipta as recommended.  For her attention deficit the Vyvanse is effective as evidenced by her ability to focus is easier when she takes medications compared when she does not take the medication.  Patient denies chest pain, or palpitations, shortness of breath.  Would like to continue the gabapentin, Qulipta, Vyvanse that she has been taking them.  OARRS report reviewed.  Discussed role of medicine, controlled substance policy, abuse potential, importance of taking medications, potential risks, benefits, and precautions to be taken.  Reviewed sleep hygiene and dietary modifications.  Follow-up in 6 months.    Constitutional: General appearance: no acute distress   Auscultation of Heart: Regular rate and rhythm, no murmurs, normal S1 and S2.   Carotid Arteries: Intact without any bruits.   Neck is supple.   No lymph adenopathy.   Peripheral Vascular Exam: Pulses +2 and equal in all extremities. No swelling, varicosities, edema or tenderness to palpations.    Abdomen is soft, nondistended. No organomegaly.  Mental status: The patient was in no distress, alert, interactive and cooperative. Affect is appropriate.   Orientation: oriented to person, oriented to place and oriented to time.   Memory: recent memory intact and remote memory intact.   Attention: normal attention span and normal concentrating ability.   Language: normal comprehension and no difficulty naming common objects.   Fund of knowledge: Patient displays adequate knowledge of current events, adequate fund of knowledge regarding past history and adequate fund of knowledge regarding vocabulary.   Eyes: The ophthalmoscopic examination was normal. The fundi are visualized with normal disc margins and without.  Cranial nerve II: Visual fields full  to confrontation.   Cranial nerves III, IV, and VI: Pupils round, equally reactive to light; no ptosis. EOMs intact. No nystagmus.   Cranial Nerve V: Facial sensation intact bilaterally.   Cranial nerve VII: Normal and symmetric facial strength.   Cranial nerve VIII: Hearing is intact bilaterally to finger rub / whisper.   Cranial nerves IX and X: Palate elevates symmetrically.   Cranial nerve XI: Shoulder shrug and neck rotation strength are intact.   Cranial nerve XII: Tongue midline with normal strength.   Motor: Motor exam was normal. Muscle bulk was normal in both upper and lower extremities. Muscle tone was normal in both upper and lower extremities. Muscle strength was 5/5 throughout. no abnormal or adventitious movements were present.   Deep Tendon Reflexes: left biceps 2+ , right biceps 2+, left triceps 2+, right triceps 2+, left brachioradialis 2+, right brachioradialis 2+, left patella 2+, right patella 2+, left ankle jerk 2+, right ankle jerk 2+   Plantar Reflex: Toes downgoing to plantar stimulation on the left. Toes downgoing to plantar stimulation on the right.   Sensory Exam: Normal to light touch.   Coordination: There is no limb dystaxia and rapid alternating movements are intact.  Gait: Gait is normal without spasticity, ataxia or bradykinesia. Stance is stable with a negative Romberg.      This note was created with voice recognition software and was not corrected for typographical or grammatical errors    OARRS:  Kate Minaya, APRN-CNP on 2/6/2024  9:55 AM  I have personally reviewed the OARRS report for Sandra Meredith. I have considered the risks of abuse, dependence, addiction and diversion    Is the patient prescribed a combination of a benzodiazepine and opioid?  No    Last Urine Drug Screen / ordered today: No  Recent Results (from the past 8760 hour(s))   Drug Screen, Urine With Reflex to Confirmation    Collection Time: 09/14/23 11:39 AM   Result Value Ref Range    DRUG SCREEN COMMENT  URINE SEE BELOW     Amphetamine Screen, Urine PRESUMPTIVE NEGATIVE NEGATIVE    Barbiturate Screen, Urine PRESUMPTIVE NEGATIVE NEGATIVE    BENZODIAZEPINE (PRESENCE) IN URINE BY SCREEN METHOD PRESUMPTIVE NEGATIVE NEGATIVE    Cannabinoid Screen, Urine PRESUMPTIVE NEGATIVE NEGATIVE    Cocaine Screen, Urine PRESUMPTIVE NEGATIVE NEGATIVE    Fentanyl, Ur PRESUMPTIVE NEGATIVE NEGATIVE    Opiate Screen, Urine PRESUMPTIVE NEGATIVE NEGATIVE    Oxycodone Screen, Ur PRESUMPTIVE NEGATIVE NEGATIVE    PCP Screen, Urine PRESUMPTIVE NEGATIVE NEGATIVE     Results are as expected.         Controlled Substance Agreement:  Date of the Last Agreement: 9/14/2023  Reviewed Controlled Substance Agreement including but not limited to the benefits, risks, and alternatives to treatment with a Controlled Substance medication(s).    Stimulants:   What is the patient's goal of therapy?  Management of symptoms   Is this being achieved with current treatment?  Yes    Activities of Daily Living:   Is your overall impression that this patient is benefiting (symptom reduction outweighs side effects) from stimulant therapy? Yes     1. Physical Functioning: Better  2. Family Relationship: Same  3. Social Relationship: Same  4. Mood: Same  5. Sleep Patterns: Same  6. Overall Function: Better       and Anticonvulsant:   What is the patient's goal of therapy?  Management of symptoms  Is this being achieved with current treatment?  Yes    Activities of Daily Living:   Is your overall impression that this patient is benefiting (symptom reduction outweighs side effects) from Anticonvulsant therapy? Yes     1. Physical Functioning: Better  2. Family Relationship: Same  3. Social Relationship: Same  4. Mood: Same  5. Sleep Patterns: Same  6. Overall Function: Better

## 2024-02-07 RX ORDER — PANTOPRAZOLE SODIUM 20 MG/1
40 TABLET, DELAYED RELEASE ORAL DAILY
Qty: 60 TABLET | Refills: 0 | Status: SHIPPED | OUTPATIENT
Start: 2024-02-07

## 2024-02-19 ENCOUNTER — HOSPITAL ENCOUNTER (EMERGENCY)
Facility: HOSPITAL | Age: 49
Discharge: HOME | End: 2024-02-19
Attending: EMERGENCY MEDICINE
Payer: COMMERCIAL

## 2024-02-19 ENCOUNTER — APPOINTMENT (OUTPATIENT)
Dept: CARDIOLOGY | Facility: HOSPITAL | Age: 49
End: 2024-02-19
Payer: COMMERCIAL

## 2024-02-19 ENCOUNTER — TELEPHONE (OUTPATIENT)
Dept: HEMATOLOGY/ONCOLOGY | Facility: HOSPITAL | Age: 49
End: 2024-02-19
Payer: COMMERCIAL

## 2024-02-19 ENCOUNTER — APPOINTMENT (OUTPATIENT)
Dept: RADIOLOGY | Facility: HOSPITAL | Age: 49
End: 2024-02-19
Payer: COMMERCIAL

## 2024-02-19 VITALS
RESPIRATION RATE: 18 BRPM | HEIGHT: 66 IN | BODY MASS INDEX: 39.37 KG/M2 | DIASTOLIC BLOOD PRESSURE: 63 MMHG | OXYGEN SATURATION: 96 % | SYSTOLIC BLOOD PRESSURE: 109 MMHG | TEMPERATURE: 97 F | HEART RATE: 70 BPM | WEIGHT: 245 LBS

## 2024-02-19 DIAGNOSIS — R06.02 SHORTNESS OF BREATH: ICD-10-CM

## 2024-02-19 DIAGNOSIS — R07.9 CHEST PAIN, UNSPECIFIED TYPE: Primary | ICD-10-CM

## 2024-02-19 LAB
ALBUMIN SERPL BCP-MCNC: 4.6 G/DL (ref 3.4–5)
ALP SERPL-CCNC: 91 U/L (ref 33–110)
ALT SERPL W P-5'-P-CCNC: 23 U/L (ref 7–45)
ANION GAP SERPL CALC-SCNC: 16 MMOL/L (ref 10–20)
AST SERPL W P-5'-P-CCNC: 22 U/L (ref 9–39)
BASOPHILS # BLD AUTO: 0.03 X10*3/UL (ref 0–0.1)
BASOPHILS NFR BLD AUTO: 0.4 %
BILIRUB DIRECT SERPL-MCNC: 0 MG/DL (ref 0–0.3)
BILIRUB SERPL-MCNC: 0.5 MG/DL (ref 0–1.2)
BNP SERPL-MCNC: 43 PG/ML (ref 0–99)
BUN SERPL-MCNC: 12 MG/DL (ref 6–23)
CALCIUM SERPL-MCNC: 10 MG/DL (ref 8.6–10.3)
CARDIAC TROPONIN I PNL SERPL HS: 5 NG/L (ref 0–13)
CARDIAC TROPONIN I PNL SERPL HS: 5 NG/L (ref 0–13)
CHLORIDE SERPL-SCNC: 101 MMOL/L (ref 98–107)
CO2 SERPL-SCNC: 27 MMOL/L (ref 21–32)
CREAT SERPL-MCNC: 0.96 MG/DL (ref 0.5–1.05)
EGFRCR SERPLBLD CKD-EPI 2021: 74 ML/MIN/1.73M*2
EOSINOPHIL # BLD AUTO: 0.03 X10*3/UL (ref 0–0.7)
EOSINOPHIL NFR BLD AUTO: 0.4 %
ERYTHROCYTE [DISTWIDTH] IN BLOOD BY AUTOMATED COUNT: 13.3 % (ref 11.5–14.5)
GLUCOSE SERPL-MCNC: 110 MG/DL (ref 74–99)
HCT VFR BLD AUTO: 43.1 % (ref 36–46)
HGB BLD-MCNC: 14.9 G/DL (ref 12–16)
IMM GRANULOCYTES # BLD AUTO: 0.03 X10*3/UL (ref 0–0.7)
IMM GRANULOCYTES NFR BLD AUTO: 0.4 % (ref 0–0.9)
INR PPP: 1.1 (ref 0.9–1.1)
LACTATE SERPL-SCNC: 1.7 MMOL/L (ref 0.4–2)
LYMPHOCYTES # BLD AUTO: 1.49 X10*3/UL (ref 1.2–4.8)
LYMPHOCYTES NFR BLD AUTO: 21.2 %
MAGNESIUM SERPL-MCNC: 2.08 MG/DL (ref 1.6–2.4)
MCH RBC QN AUTO: 28 PG (ref 26–34)
MCHC RBC AUTO-ENTMCNC: 34.6 G/DL (ref 32–36)
MCV RBC AUTO: 81 FL (ref 80–100)
MONOCYTES # BLD AUTO: 0.48 X10*3/UL (ref 0.1–1)
MONOCYTES NFR BLD AUTO: 6.8 %
NEUTROPHILS # BLD AUTO: 4.97 X10*3/UL (ref 1.2–7.7)
NEUTROPHILS NFR BLD AUTO: 70.8 %
NRBC BLD-RTO: 0 /100 WBCS (ref 0–0)
PLATELET # BLD AUTO: 301 X10*3/UL (ref 150–450)
POTASSIUM SERPL-SCNC: 3.7 MMOL/L (ref 3.5–5.3)
PROT SERPL-MCNC: 7.6 G/DL (ref 6.4–8.2)
PROTHROMBIN TIME: 12.1 SECONDS (ref 9.8–12.8)
RBC # BLD AUTO: 5.33 X10*6/UL (ref 4–5.2)
SODIUM SERPL-SCNC: 140 MMOL/L (ref 136–145)
WBC # BLD AUTO: 7 X10*3/UL (ref 4.4–11.3)

## 2024-02-19 PROCEDURE — 96361 HYDRATE IV INFUSION ADD-ON: CPT

## 2024-02-19 PROCEDURE — 2550000001 HC RX 255 CONTRASTS: Performed by: EMERGENCY MEDICINE

## 2024-02-19 PROCEDURE — 85610 PROTHROMBIN TIME: CPT | Performed by: PHYSICIAN ASSISTANT

## 2024-02-19 PROCEDURE — 85025 COMPLETE CBC W/AUTO DIFF WBC: CPT | Performed by: PHYSICIAN ASSISTANT

## 2024-02-19 PROCEDURE — 36415 COLL VENOUS BLD VENIPUNCTURE: CPT | Performed by: PHYSICIAN ASSISTANT

## 2024-02-19 PROCEDURE — 71275 CT ANGIOGRAPHY CHEST: CPT | Performed by: RADIOLOGY

## 2024-02-19 PROCEDURE — 2500000004 HC RX 250 GENERAL PHARMACY W/ HCPCS (ALT 636 FOR OP/ED): Performed by: PHYSICIAN ASSISTANT

## 2024-02-19 PROCEDURE — 93005 ELECTROCARDIOGRAM TRACING: CPT

## 2024-02-19 PROCEDURE — 82248 BILIRUBIN DIRECT: CPT | Performed by: PHYSICIAN ASSISTANT

## 2024-02-19 PROCEDURE — 80048 BASIC METABOLIC PNL TOTAL CA: CPT | Performed by: PHYSICIAN ASSISTANT

## 2024-02-19 PROCEDURE — 71275 CT ANGIOGRAPHY CHEST: CPT

## 2024-02-19 PROCEDURE — 99285 EMERGENCY DEPT VISIT HI MDM: CPT | Mod: 25

## 2024-02-19 PROCEDURE — 83880 ASSAY OF NATRIURETIC PEPTIDE: CPT | Performed by: PHYSICIAN ASSISTANT

## 2024-02-19 PROCEDURE — 96374 THER/PROPH/DIAG INJ IV PUSH: CPT

## 2024-02-19 PROCEDURE — 83605 ASSAY OF LACTIC ACID: CPT | Performed by: PHYSICIAN ASSISTANT

## 2024-02-19 PROCEDURE — 84484 ASSAY OF TROPONIN QUANT: CPT | Performed by: PHYSICIAN ASSISTANT

## 2024-02-19 PROCEDURE — 83735 ASSAY OF MAGNESIUM: CPT | Performed by: PHYSICIAN ASSISTANT

## 2024-02-19 RX ORDER — SODIUM CHLORIDE 9 MG/ML
125 INJECTION, SOLUTION INTRAVENOUS CONTINUOUS
Status: DISCONTINUED | OUTPATIENT
Start: 2024-02-19 | End: 2024-02-20 | Stop reason: HOSPADM

## 2024-02-19 RX ORDER — ONDANSETRON HYDROCHLORIDE 2 MG/ML
4 INJECTION, SOLUTION INTRAVENOUS ONCE
Status: COMPLETED | OUTPATIENT
Start: 2024-02-19 | End: 2024-02-19

## 2024-02-19 RX ORDER — MORPHINE SULFATE 4 MG/ML
4 INJECTION, SOLUTION INTRAMUSCULAR; INTRAVENOUS ONCE
Status: DISCONTINUED | OUTPATIENT
Start: 2024-02-19 | End: 2024-02-19

## 2024-02-19 RX ADMIN — ONDANSETRON 4 MG: 2 INJECTION INTRAMUSCULAR; INTRAVENOUS at 18:01

## 2024-02-19 RX ADMIN — IOHEXOL 75 ML: 350 INJECTION, SOLUTION INTRAVENOUS at 19:02

## 2024-02-19 RX ADMIN — SODIUM CHLORIDE 125 ML/HR: 9 INJECTION, SOLUTION INTRAVENOUS at 18:01

## 2024-02-19 ASSESSMENT — HEART SCORE
TROPONIN: LESS THAN OR EQUAL TO NORMAL LIMIT
ECG: NORMAL
HISTORY: SLIGHTLY SUSPICIOUS
HEART SCORE: 2
RISK FACTORS: 1-2 RISK FACTORS
AGE: 45-64

## 2024-02-19 ASSESSMENT — PAIN SCALES - GENERAL
PAINLEVEL_OUTOF10: 0 - NO PAIN
PAINLEVEL_OUTOF10: 0 - NO PAIN
PAINLEVEL_OUTOF10: 3

## 2024-02-19 ASSESSMENT — COLUMBIA-SUICIDE SEVERITY RATING SCALE - C-SSRS
1. IN THE PAST MONTH, HAVE YOU WISHED YOU WERE DEAD OR WISHED YOU COULD GO TO SLEEP AND NOT WAKE UP?: NO
2. HAVE YOU ACTUALLY HAD ANY THOUGHTS OF KILLING YOURSELF?: NO
6. HAVE YOU EVER DONE ANYTHING, STARTED TO DO ANYTHING, OR PREPARED TO DO ANYTHING TO END YOUR LIFE?: NO

## 2024-02-19 ASSESSMENT — LIFESTYLE VARIABLES
HAVE YOU EVER FELT YOU SHOULD CUT DOWN ON YOUR DRINKING: NO
HAVE PEOPLE ANNOYED YOU BY CRITICIZING YOUR DRINKING: NO
EVER FELT BAD OR GUILTY ABOUT YOUR DRINKING: NO
EVER HAD A DRINK FIRST THING IN THE MORNING TO STEADY YOUR NERVES TO GET RID OF A HANGOVER: NO

## 2024-02-19 ASSESSMENT — PAIN - FUNCTIONAL ASSESSMENT
PAIN_FUNCTIONAL_ASSESSMENT: 0-10
PAIN_FUNCTIONAL_ASSESSMENT: 0-10

## 2024-02-19 NOTE — TELEPHONE ENCOUNTER
Pt called with questions regarding medications and getting D-Dimer, iron study labs ordered. She is not sure if she is supposed to take Aspirin after or while taking Eliquis. Pt is requesting call from office.

## 2024-02-19 NOTE — ED TRIAGE NOTES
Pt to ED for c/o SOB intermittent.  Pt states she has hx angioplasty and concern for a blood clot.  Respirations even and unlabored.  No acute distress noted at this time.  A&Ox4.  VSS.

## 2024-02-19 NOTE — ED PROVIDER NOTES
HPI   Chief Complaint   Patient presents with    Shortness of Breath     Concern for clot per patient       This is a pleasant 48-year-old female who presents to the emergency room with chief complaint of some midsternal chest discomfort that she has been experiencing off-and-on for the past week.  Most notably when she leans forward.  The patient states that last night the symptoms got worse where she feels pressure underneath her bra line and feels more short of breath.  Patient states she has had blood clots in the past and is concerned that they have returned.  She is on Eliquis but she states a couple of months ago she had her dose adjusted from 5 mg to 2.5 mg.  She also had stopped her spironolactone and last night was advised to start retaking it.  She does have a history of B-cell lymphoma stage IV diagnosed August 2022 for transitioning to likely lymphoma, diagnosed back in August 2022 after a fall and they found a pathological right humeral fracture.  She had open reduction internal fixation that August.  She also noted a PET scan back on 11 August 2022 which showed uptake in the right humerus, left posterior pleura, left iliac bone and left external iliac lymph nodes.  The patient is currently in remission from her lymphoma.  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  Patient underwent SVC thrombectomy with interventional radiology in the week of March 27, 2023.  Patient rates her chest discomfort a 3 out of 10 is constant worse with exertion there are no alleviating factors.  She took no medication for her symptoms prior to arrival.  She also has a history of postconcussion syndrome attention deficit disorder.      History provided by:  Medical records and patient                      Magdalena Coma Scale Score: 15                     Patient History   Past Medical History:   Diagnosis Date    Encounter for  initial prescription of other contraceptives 08/12/2022    Hormonal contraceptive     Past Surgical History:   Procedure Laterality Date    OTHER SURGICAL HISTORY  08/12/2022    Orthopedic surgery    OTHER SURGICAL HISTORY  08/12/2022    Hand surgery    US GUIDED BIOPSY LYMPH NODE SUPERFICIAL  1/30/2023    US GUIDED BIOPSY LYMPH NODE SUPERFICIAL 1/30/2023 [facility]     No family history on file.  Social History     Tobacco Use    Smoking status: Former     Years: 20     Types: Cigarettes    Smokeless tobacco: Former   Vaping Use    Vaping Use: Never used   Substance Use Topics    Alcohol use: Never    Drug use: Never       Physical Exam   ED Triage Vitals [02/19/24 1717]   Temperature Heart Rate Respirations BP   36.1 °C (97 °F) 81 18 141/90      Pulse Ox Temp Source Heart Rate Source Patient Position   98 % Temporal -- --      BP Location FiO2 (%)     -- --       Physical Exam  Vitals and nursing note reviewed. Exam conducted with a chaperone present.   Constitutional:       General: She is awake. She is not in acute distress.     Appearance: Normal appearance. She is well-developed, well-groomed and overweight. She is not ill-appearing, toxic-appearing or diaphoretic.   HENT:      Head: Normocephalic and atraumatic.      Right Ear: Tympanic membrane, ear canal and external ear normal.      Left Ear: Tympanic membrane, ear canal and external ear normal.      Nose: Nose normal.      Mouth/Throat:      Mouth: Mucous membranes are moist.      Pharynx: Oropharynx is clear.   Eyes:      Extraocular Movements: Extraocular movements intact.      Conjunctiva/sclera: Conjunctivae normal.      Pupils: Pupils are equal, round, and reactive to light.   Cardiovascular:      Rate and Rhythm: Normal rate and regular rhythm.      Pulses: Normal pulses.      Heart sounds: Normal heart sounds.   Pulmonary:      Effort: Pulmonary effort is normal.      Breath sounds: Normal breath sounds. No wheezing, rhonchi or rales.    Abdominal:      General: Abdomen is flat. Bowel sounds are normal.      Palpations: Abdomen is soft. There is no mass.      Tenderness: There is no abdominal tenderness. There is no guarding.   Musculoskeletal:         General: No swelling or tenderness. Normal range of motion.      Cervical back: Normal range of motion and neck supple.      Right lower leg: No edema.      Left lower leg: No edema.   Skin:     General: Skin is warm and dry.      Capillary Refill: Capillary refill takes less than 2 seconds.      Findings: No rash.   Neurological:      General: No focal deficit present.      Mental Status: She is alert and oriented to person, place, and time. Mental status is at baseline.   Psychiatric:         Mood and Affect: Mood normal.         Behavior: Behavior normal. Behavior is cooperative.         Thought Content: Thought content normal.         Judgment: Judgment normal.         ED Course & MDM   Diagnoses as of 02/19/24 2141   Chest pain, unspecified type   Shortness of breath       Medical Decision Making  ED course: Temperature is 36.1, heart rate 81, respirations 18, pulse ox was 98% on room air blood pressure is 141/90, weight 111 kg.  Patient was placed on monitor have ordered EKG lab work, 4 mg of Zofran IV push for nausea 4 mg of morphine IV push for pain.  We ordered a CT scan of the chest to rule out PE  Initial EKG interpreted by me at 1737 hrs. normal sinus rhythm rate 75  QRS was 82  QTc was 433 no ischemic changes.  Patient repeat troponin was negative heart score is a 2.  I did round on the patient discussed results of workup and repeat exam she is currently asymptomatic.  Patient states that she will call her cardiologist in the morning.  She was advised to continue the current medications as prescribed.  I completed a heart score to screen for major adverse cardiac event in this patient.  The evidence indicates the patient is very low risk for M ACE and this is consistent with  clinical intuition.  The risk of further workup or hospitalization for M ACE is likely higher than the risk of the patient having a major adverse cardiac event.  It is, therefore, the patient's best interest not to do additional emergent testing or be hospitalized for M ACE.  I discussed with the patient and her mother my clinical impression result of the heart score to screen for M ACE as well as the risk of further testing hospitalization.  The heart score shows the patient's risk for M ACE is 1%.  Return precautions were discussed all questions answered prior to discharge  Repeat vital signs heart rate 64 /75 respirations 18 100% on room air her pain score is a 0 repeat EKG interpreted by me at 2218 hrs. sinus bradycardia rate 58 PA was 136 QRS was 76  QTc was 437.              Shared CARMEN Attestation:    This patient was seen by the advanced practice provider.  I personally saw the patient and made/approved the management plan and take responsibility for the patient management.    History: 48-year-old female presents with chest pain.    Exam: Regular rate and rhythm cardiac exam with clear breath sounds bilaterally.  Abdomen is soft and nontender.  Neurological exam is grossly intact.    MDM: PE, pneumonia, superior vena cava syndrome, ACS, metastasis    I have seen and examined the patient, agree with the workup, evaluation, medical decision making, management and diagnosis.  The care plan has been discussed.    Db Solano MD      Procedure  Procedures     Rafa Oviedo PA-C  02/19/24 2141       Db SMART MD  02/19/24 2152       Rafa Oviedo PA-C  02/19/24 2224

## 2024-02-21 ENCOUNTER — LAB (OUTPATIENT)
Dept: LAB | Facility: HOSPITAL | Age: 49
End: 2024-02-21
Payer: COMMERCIAL

## 2024-02-21 ENCOUNTER — OFFICE VISIT (OUTPATIENT)
Dept: CARDIOLOGY | Facility: HOSPITAL | Age: 49
End: 2024-02-21
Payer: COMMERCIAL

## 2024-02-21 VITALS
SYSTOLIC BLOOD PRESSURE: 114 MMHG | OXYGEN SATURATION: 98 % | WEIGHT: 245.8 LBS | DIASTOLIC BLOOD PRESSURE: 67 MMHG | RESPIRATION RATE: 16 BRPM | HEART RATE: 72 BPM | TEMPERATURE: 96.3 F | BODY MASS INDEX: 39.67 KG/M2

## 2024-02-21 DIAGNOSIS — Z92.21 STATUS POST ADMINISTRATION OF CARDIOTOXIC CHEMOTHERAPY: ICD-10-CM

## 2024-02-21 DIAGNOSIS — I47.11 INAPPROPRIATE SINUS TACHYCARDIA (CMS-HCC): ICD-10-CM

## 2024-02-21 DIAGNOSIS — C83.30 DIFFUSE LARGE B-CELL LYMPHOMA, UNSPECIFIED BODY REGION (MULTI): ICD-10-CM

## 2024-02-21 DIAGNOSIS — R07.89 ATYPICAL CHEST PAIN: ICD-10-CM

## 2024-02-21 DIAGNOSIS — I87.1 SVC SYNDROME: ICD-10-CM

## 2024-02-21 DIAGNOSIS — I87.1 SVC SYNDROME: Primary | ICD-10-CM

## 2024-02-21 DIAGNOSIS — R94.31 ABNORMAL ECG: ICD-10-CM

## 2024-02-21 DIAGNOSIS — Z87.891 FORMER SMOKER: ICD-10-CM

## 2024-02-21 LAB
D DIMER PPP FEU-MCNC: <215 NG/ML FEU
FERRITIN SERPL-MCNC: 135 NG/ML (ref 8–150)
IRON SATN MFR SERPL: 14 % (ref 25–45)
IRON SERPL-MCNC: 60 UG/DL (ref 35–150)
TIBC SERPL-MCNC: 419 UG/DL (ref 240–445)
UIBC SERPL-MCNC: 359 UG/DL (ref 110–370)

## 2024-02-21 PROCEDURE — 82728 ASSAY OF FERRITIN: CPT

## 2024-02-21 PROCEDURE — 1036F TOBACCO NON-USER: CPT | Performed by: INTERNAL MEDICINE

## 2024-02-21 PROCEDURE — 99215 OFFICE O/P EST HI 40 MIN: CPT | Performed by: INTERNAL MEDICINE

## 2024-02-21 PROCEDURE — 83540 ASSAY OF IRON: CPT

## 2024-02-21 PROCEDURE — 85379 FIBRIN DEGRADATION QUANT: CPT

## 2024-02-21 PROCEDURE — 3008F BODY MASS INDEX DOCD: CPT | Performed by: INTERNAL MEDICINE

## 2024-02-21 PROCEDURE — 36415 COLL VENOUS BLD VENIPUNCTURE: CPT

## 2024-02-21 RX ORDER — APIXABAN 2.5 MG/1
2.5 TABLET, FILM COATED ORAL 2 TIMES DAILY
Qty: 60 TABLET | Refills: 2 | Status: SHIPPED | OUTPATIENT
Start: 2024-02-21 | End: 2024-02-23 | Stop reason: SDUPTHER

## 2024-02-21 ASSESSMENT — ENCOUNTER SYMPTOMS
RHINORRHEA: 1
GASTROINTESTINAL NEGATIVE: 1
SHORTNESS OF BREATH: 1
SORE THROAT: 1
MUSCULOSKELETAL NEGATIVE: 1
LIGHT-HEADEDNESS: 1
PALPITATIONS: 0
VOICE CHANGE: 1
APPETITE CHANGE: 1
NERVOUS/ANXIOUS: 1
FATIGUE: 1

## 2024-02-21 ASSESSMENT — PAIN SCALES - GENERAL: PAINLEVEL: 0-NO PAIN

## 2024-02-21 NOTE — PROGRESS NOTES
"  Patient:  Sandra Meredith  YOB: 1975  MRN: [id number]       Chief Complaint/Active Symptoms:       Sandra Meredith is a 48 y.o. female who returns today for cardiac follow-up.    Patient here for urgent add on due to chest pain/discomfort. Had similar pain when she had her SVC syndrome last year and had mechanical thrombectomy of SVC/IVC. For the past week had starting having pressure in her upper trunk when bending over. Then 2 nights ago after  doing some mild lifting had some sharp pain under her breast, also had a sharp prickly pain under bilateral costal margins. Feels lightheaded when she blows her nose (all of these symptoms she had with her SVC syndrome). Also had some sharp \"zingers\" down her left arm. Has consistent recurrent pressure sensation at the base of her neck. Couldn't get comfortable or \"catch her breath\". Feels that her veins in her arms at that time were pronounced - but not at present. Feels like something is wrong.    Started taking her spironolactone again. Feels fatigued, GODINEZ, tingling in bilateral legs. Felt nauseous - multi system complaints.     Cancer Diagnosis: B cell lymphoma, stage IV (DLCBL). Diagnosed 8/2022 with pathologic right arm fracture.   Oncologist: Connie Maier  Treatment: [facility] - started 9/2022, completed 6 cycles.XRT to left pelvic/gluteal - completed 3/2023 - 30 Gray  Radiation: Radiation therapy to right arm 9/2022 in 10 fractions.    Review of Systems   Constitutional:  Positive for appetite change and fatigue.   HENT:  Positive for rhinorrhea, sore throat (raspy voice) and voice change (raspy voice).    Eyes:  Positive for visual disturbance.   Respiratory:  Positive for shortness of breath.    Cardiovascular:  Positive for chest pain (heaviness under left clavicle, throat pressure). Negative for palpitations and leg swelling.   Gastrointestinal: Negative.    Endocrine: Positive for heat intolerance.   Genitourinary: Negative.    Musculoskeletal: Negative.  "   Skin: Negative.    Neurological:  Positive for light-headedness.   Psychiatric/Behavioral:  The patient is nervous/anxious.    All other systems reviewed and are negative.      Objective:     Vitals:    02/21/24 1118   BP: 114/67   Pulse: 72   Resp: 16   Temp: 35.7 °C (96.3 °F)   SpO2: 98%       Vitals:    02/21/24 1118   Weight: 111 kg (245 lb 12.8 oz)       Allergies:     Allergies   Allergen Reactions    Adhes. Band-Tape-Benzalkonium Hives    Hi-B-12 Hives    Lovenox [Enoxaparin] Hives    Neosporin Daily Body Hives    Nickel Hives    Zithromax Tri-Devin [Azithromycin] Hives          Medications:     Current Outpatient Medications   Medication Instructions    acetaminophen (Tylenol) 500 mg tablet oral    ascorbic acid (Vitamin C) 500 mg tablet oral    calcium carbonate 500 mg calcium (1,250 mg) chewable tablet 1 tablet, oral, Daily    cholecalciferol (Vitamin D-3) 50 MCG (2000 UT) tablet 1 tablet, oral, Daily    cyclobenzaprine (Flexeril) 10 mg tablet oral    Eliquis 2.5 mg, oral, 2 times daily    FeroSuL tablet 1 tablet, oral, 2 times daily    folic acid (FOLVITE) 1 mg, oral, Daily    gabapentin (NEURONTIN) 600 mg, oral, 2 times daily    garlic 500 mg capsule oral    lisdexamfetamine (VYVANSE) 20 mg, oral, Every morning    [START ON 3/5/2024] lisdexamfetamine (VYVANSE) 20 mg, oral, Every morning    [START ON 4/4/2024] lisdexamfetamine (VYVANSE) 20 mg, oral, Every morning    magnesium, amino acid chelate, 133 mg tablet 1 tablet, oral, 2 times daily    metoprolol succinate XL (TOPROL-XL) 100 mg, oral, 2 times daily    omega-3-dha-epa-dpa-fish oil (Omega MonoPure) 863-260-1,300 mg capsule,delayed release(DR/EC) oral    pantoprazole (PROTONIX) 40 mg, oral, Daily    potassium gluconate 595 mg (99 mg) tablet 1 tablet, oral, Daily    Qulipta 60 mg, oral, Daily    sertraline (ZOLOFT) 37.5 mg, oral, Daily    spironolactone (ALDACTONE) 25 mg, oral, Daily    thiamine (VITAMIN B-1) 100 mg, oral, Daily       Physical  "Examination:   GENERAL:  Well developed, well nourished, in no acute distress.  HEENT: NC AT, EOMI with anicteric sclera  NECK:  Supple, no JVD, no bruit.  CHEST:  Symmetric and nontender.  LUNGS:  Clear to auscultation bilaterally, normal respiratory effort.  HEART:  PMI is nondisplaced. RRR with normal S1 and S2, no S3, no mumur or rub. No carotid or abdominal bruits  ABDOMEN: Soft, NT, ND without palpable organomegaly or bruits  EXTREMITIES:  Warm with good color, no clubbing or cyanosis.  There is no edema noted.  PERIPHERAL VASCULAR:  Pulses present and equally palpable; 2+ throughout.  MUSCULOSKELETAL:  No significant joint or limb deformity.   NEURO/PSYCH:  Alert and oriented times three with approppriate behavior and responses. Nonfocal motor examination with normal gait and ambulation  Skin: no rash or lesions on exposed skin or reported.    Lab:     CBC:   Lab Results   Component Value Date    WBC 7.0 02/19/2024    RBC 5.33 (H) 02/19/2024    HGB 14.9 02/19/2024    HCT 43.1 02/19/2024     02/19/2024        CMP:    Lab Results   Component Value Date     02/19/2024    K 3.7 02/19/2024     02/19/2024    CO2 27 02/19/2024    BUN 12 02/19/2024    CREATININE 0.96 02/19/2024    GLUCOSE 110 (H) 02/19/2024    CALCIUM 10.0 02/19/2024       Magnesium:    Lab Results   Component Value Date    MG 2.08 02/19/2024       Lipid Profile:    No results found for: \"CHLPL\", \"TRIG\", \"HDL\", \"LDLCALC\", \"LDLDIRECT\"    TSH:    Lab Results   Component Value Date    TSH 1.88 03/02/2023       BNP:   Lab Results   Component Value Date    BNP 43 02/19/2024        PT/INR:    Lab Results   Component Value Date    PROTIME 12.1 02/19/2024    INR 1.1 02/19/2024       HgBA1c:    No results found for: \"HGBA1C\"    BMP:  Lab Results   Component Value Date     02/19/2024     12/20/2023     09/14/2023     08/23/2023     08/16/2023    K 3.7 02/19/2024    K 4.3 12/20/2023    K 4.2 09/14/2023    K 4.1 " "08/23/2023    K 4.2 08/16/2023     02/19/2024     12/20/2023     09/14/2023    CL 99 08/23/2023     08/16/2023    CO2 27 02/19/2024    CO2 26 12/20/2023    CO2 26 09/14/2023    CO2 24 08/23/2023    CO2 28 08/16/2023    BUN 12 02/19/2024    BUN 15 12/20/2023    BUN 14 09/14/2023    BUN 16 08/23/2023    BUN 16 08/16/2023    CREATININE 0.96 02/19/2024    CREATININE 0.87 12/20/2023    CREATININE 0.83 09/14/2023    CREATININE 0.85 08/23/2023    CREATININE 0.80 08/16/2023       Cardiac Enzymes:    Lab Results   Component Value Date    TROPHS 5 02/19/2024    TROPHS 5 02/19/2024    TROPHS 20 02/20/2023       Hepatic Function Panel:    Lab Results   Component Value Date    ALKPHOS 91 02/19/2024    ALT 23 02/19/2024    AST 22 02/19/2024    PROT 7.6 02/19/2024    BILITOT 0.5 02/19/2024    BILIDIR 0.0 02/19/2024         Diagnostic Studies:     No echocardiogram results found for the past 12 months    No nuclear medicine results found for the past 12 months    No valid procedures specified.    EKG:   No results found for: \"EKG\"    Radiology:     No orders to display     ASSESSMENT     Problem List Items Addressed This Visit       RESOLVED: Abnormal ECG    B-cell lymphoma (CMS/HCC)    SVC syndrome - Primary    Relevant Orders    Referral to Vascular Medicine    D-dimer, Non VTE    Status post administration of cardiotoxic chemotherapy    RESOLVED: Inappropriate sinus tachycardia    Former smoker    Atypical chest pain       PLAN     History of SVC syndrome.  Patient has symptoms plus a lot of other somatic symptoms similar to what she had with her SVC syndrome.  She had mechanical thrombectomy and an angioplasty at the site where her port was that was causing some obstruction and limitation of flow.  While she has these intermittent symptoms she does not have anything else at this point in time to suggest a problem.  Her CT angiogram at the [facility] emergency room did not identify any pulmonary emboli but " she does not have osiris and that the examination because they missed it the last time she had a CT angiography.  At this time we will check a D-dimer and have encouraged her to continue with her oral anticoagulation.  Will give an urgent referral to vascular medicine given her negative CTA for additional evaluation for any recurrent problems with her SVC.  B-cell lymphoma with cardiotoxic chemotherapy.  No signs of heart failure.  Heart function has been stable and normal throughout her treatment.  Atypical chest pain.  Patient reports chest discomfort is not suggestive of angina pectoris but as discussed above mimics that what she had with her SVC syndrome.  We will proceed with additional evaluation as recommended by vascular medicine or if she hears back from the interventional radiologist who performed her original procedure can follow-up with them as well.  Somatic complaints.  There are a lot of other complaints that do not appear to be closely related to problems with her heart or SVC.  We have encouraged her to discuss these other ongoing issues with her primary care physician.    Will see back in follow-up after she has a chance to be evaluated by vascular medicine.  Will continue to work with her to try to get this evaluation performed

## 2024-02-22 ENCOUNTER — TELEPHONE (OUTPATIENT)
Dept: ADMISSION | Facility: HOSPITAL | Age: 49
End: 2024-02-22
Payer: COMMERCIAL

## 2024-02-22 DIAGNOSIS — I87.1 SVC SYNDROME: ICD-10-CM

## 2024-02-22 NOTE — TELEPHONE ENCOUNTER
Sandra needs Eliquis rx sent to [facility] on file (it was sent to [facility] and she does not fill there).  Needs this sent in today as she is out of medication.

## 2024-02-23 ENCOUNTER — OFFICE VISIT (OUTPATIENT)
Dept: CARDIOLOGY | Facility: CLINIC | Age: 49
End: 2024-02-23
Payer: COMMERCIAL

## 2024-02-23 VITALS
HEIGHT: 66 IN | BODY MASS INDEX: 39.05 KG/M2 | WEIGHT: 243 LBS | SYSTOLIC BLOOD PRESSURE: 120 MMHG | DIASTOLIC BLOOD PRESSURE: 87 MMHG

## 2024-02-23 DIAGNOSIS — Z92.21 HISTORY OF CHEMOTHERAPY: ICD-10-CM

## 2024-02-23 DIAGNOSIS — I87.1 SVC SYNDROME: ICD-10-CM

## 2024-02-23 DIAGNOSIS — I87.1 SVC SYNDROME: Primary | ICD-10-CM

## 2024-02-23 PROCEDURE — 1036F TOBACCO NON-USER: CPT | Performed by: INTERNAL MEDICINE

## 2024-02-23 PROCEDURE — 3008F BODY MASS INDEX DOCD: CPT | Performed by: INTERNAL MEDICINE

## 2024-02-23 PROCEDURE — 99204 OFFICE O/P NEW MOD 45 MIN: CPT | Performed by: INTERNAL MEDICINE

## 2024-02-23 RX ORDER — APIXABAN 2.5 MG/1
2.5 TABLET, FILM COATED ORAL 2 TIMES DAILY
Qty: 60 TABLET | Refills: 2 | Status: SHIPPED | OUTPATIENT
Start: 2024-02-23 | End: 2024-02-23 | Stop reason: SDUPTHER

## 2024-02-23 RX ORDER — APIXABAN 2.5 MG/1
2.5 TABLET, FILM COATED ORAL 2 TIMES DAILY
Qty: 60 TABLET | Refills: 2 | Status: CANCELLED | OUTPATIENT
Start: 2024-02-23 | End: 2024-05-23

## 2024-02-23 RX ORDER — APIXABAN 2.5 MG/1
2.5 TABLET, FILM COATED ORAL 2 TIMES DAILY
Qty: 60 TABLET | Refills: 2 | Status: SHIPPED | OUTPATIENT
Start: 2024-02-23 | End: 2024-04-25 | Stop reason: SDUPTHER

## 2024-02-23 NOTE — PATIENT INSTRUCTIONS
R Thank you for coming to see us in the [facility] today.   We have reviewed the results of the diagnostic testing, we do not think the symptoms are related to SVC syndrome, and you do not have a blood clot in your lungs.   We would like to repeat an echocardiogram and venous duplex ultrasound of the bilateral arms to reassess the SVC condition.  Once the tests are done, we will call you with the results and to make further plan for  management.   Please continue taking all your medications.

## 2024-02-23 NOTE — TELEPHONE ENCOUNTER
Sandra notified rx has been sent to correct pharmacy. Patient verbalized understanding and has no further questions or concerns at this time.

## 2024-02-23 NOTE — PROGRESS NOTES
Referred by Dr. Harmony Ho for evaluation for SVC syndrome       History of Present Illnessfound   Sandra Meredith is a 48 y.o. year old female patient with h/o B cell lymphoma, stage IV, Diagnosed 8/2022 with pathologic right arm fracture, s/p cardiotoxic chemotherapy, in remission now, SVC syndrome, who  was admitted in Feb 2023 with upper body swelling, and was found to have acute non-occlusive deep vein thrombosis in IJV, and IVC occlusion, caused by Mediport, (later removed),  s/p SVC PTA, MECHANICAL THROMBECTOMY of SVC with IR Dr Ortiz on 3/29/23.  Pt presented to the ER on 2/19/24 with c/o some midsternal chest discomfort that she has been experiencing off-and-on for the week prior, that felt like pressure under the bra, some shortness of breath, and bilateral arm numbness and tingling. She also noted upper body veins engorgement, raspy voice, leg tingling and fatigue, lightheadedness with bending over, pressure in the throat.   The symptoms were similar to the prior SVC syndrome presentation, except for body swelling.  2/19/24 CT ANGIO CHEST for PE:   - Stable examination. No findings of acute pulmonary embolism. Fatty infiltration liver. Known history of lymphoma    Pt is presenting today, stating, the veins are not as pronounced as a week ago, no swelling, no chest pain.   She is compliant with Eliquis        Past Medical History  Past Medical History:   Diagnosis Date    Abnormal ECG 01/21/2023    Encounter for initial prescription of other contraceptives 08/12/2022    Hormonal contraceptive    Inappropriate sinus tachycardia 12/04/2023       Past Surgical History  Past Surgical History:   Procedure Laterality Date    OTHER SURGICAL HISTORY  08/12/2022    Orthopedic surgery    OTHER SURGICAL HISTORY  08/12/2022    Hand surgery    US GUIDED BIOPSY LYMPH NODE SUPERFICIAL  1/30/2023    US GUIDED BIOPSY LYMPH NODE SUPERFICIAL 1/30/2023 DOCTOR OFFICE [facility]       Social History  Social History     Tobacco  Use    Smoking status: Former     Years: 20     Types: Cigarettes     Quit date:      Years since quittin.1    Smokeless tobacco: Former   Vaping Use    Vaping Use: Never used   Substance Use Topics    Alcohol use: Yes     Comment: occ    Drug use: Never       Family History   No family history on file.    Review of Systems  As per HPI, all other systems reviewed and negative.    Allergies:  Allergies   Allergen Reactions    Adhes. Band-Tape-Benzalkonium Hives    Hi-B-12 Hives    Lovenox [Enoxaparin] Hives    Neosporin Daily Body Hives    Nickel Hives    Zithromax Tri-Devin [Azithromycin] Hives        Outpatient Medications:  Current Outpatient Medications   Medication Instructions    acetaminophen (Tylenol) 500 mg tablet oral    ascorbic acid (Vitamin C) 500 mg tablet oral    calcium carbonate 500 mg calcium (1,250 mg) chewable tablet 1 tablet, oral, Daily    cholecalciferol (Vitamin D-3) 50 MCG ( UT) tablet 1 tablet, oral, Daily    cyclobenzaprine (Flexeril) 10 mg tablet oral    Eliquis 2.5 mg, oral, 2 times daily    FeroSuL tablet 1 tablet, oral, 2 times daily    folic acid (FOLVITE) 1 mg, oral, Daily    gabapentin (NEURONTIN) 600 mg, oral, 2 times daily    garlic 500 mg capsule oral    lisdexamfetamine (VYVANSE) 20 mg, oral, Every morning    [START ON 3/5/2024] lisdexamfetamine (VYVANSE) 20 mg, oral, Every morning    [START ON 2024] lisdexamfetamine (VYVANSE) 20 mg, oral, Every morning    magnesium, amino acid chelate, 133 mg tablet 1 tablet, oral, 2 times daily    metoprolol succinate XL (TOPROL-XL) 100 mg, oral, 2 times daily    omega-3-dha-epa-dpa-fish oil (Omega MonoPure) 860260-1,300 mg capsule,delayed release(DR/EC) oral    pantoprazole (PROTONIX) 40 mg, oral, Daily    potassium gluconate 595 mg (99 mg) tablet 1 tablet, oral, Daily    Qulipta 60 mg, oral, Daily    sertraline (ZOLOFT) 37.5 mg, oral, Daily    spironolactone (ALDACTONE) 25 mg, oral, Daily    thiamine (VITAMIN B-1) 100 mg, oral,  Daily         Vitals:  Vitals:    02/23/24 1000   BP: 120/87       Physical Exam:  Constitutional: Well developed, NAD, awake/alert/oriented x3, pleasant, cooperative   Head/Neck: Neck supple,  No JVD, trachea midline, no bruits   Respiratory/Thorax: Patent airways, CTAB, normal breath sounds, thorax symmetric. No vein engorgement    Cardiovascular: Regular, rate and rhythm, no murmurs, normal S 1 and S 2   Gastrointestinal: Nondistended, soft, non-tender, +BS,   Psychological: Appropriate mood and behavior   Skin: Warm and dry,   Extremities: No edema, no open sores, Distal Pulses palpable     Reviewed Study(s):    Cardiac Studies  Echo: 9/14/23:  1. Left ventricular systolic function is normal with a 55-60% estimated ejection fraction.  2. Strain values are normal, which imply normal myocardial function      Vascular Studies   ECG 12 lead  Sinus bradycardia  Otherwise normal ECG  When compared with ECG of 19-FEB-2024 17:32, (unconfirmed)  No significant change was found       Assessment/Plan   Sandra Meredith is a 48 y.o. year old female patient with h/o B cell lymphoma, stage IV, Diagnosed 8/2022 with pathologic right arm fracture, s/p cardiotoxic chemotherapy, in remission now, SVC syndrome, who  was admitted in Feb 2023 with upper body swelling, and was found to have acute non-occlusive deep vein thrombosis in IJV, and IVC occlusion, caused by Mediport, (later removed),  s/p SVC PTA, MECHANICAL THROMBECTOMY of SVC with IR Dr Ortiz on 3/29/23.  Pt presented to the ER on 2/19/24 with c/o some midsternal chest discomfort that she has been experiencing off-and-on for the week prior, that felt like pressure under the bra, some shortness of breath, and bilateral arm numbness and tingling. She also noted upper body veins engorgement, raspy voice, leg tingling and fatigue, lightheadedness with bending over, pressure in the throat.   The symptoms were similar to the prior SVC syndrome presentation, except for body  swelling.  2/19/24 CT ANGIO CHEST for PE:   - Stable examination. No findings of acute pulmonary embolism. Fatty infiltration liver. Known history of lymphoma    Pt is presenting today, stating, the veins are not as pronounced as a week ago, no swelling, no chest pain.   She is compliant with Eliquis.  Chart was reviewed with the pt,  specifically CT of the chest, compared to previous tests, the symptoms are not related to worsening of SVC syndrome. There is no PE.   We will order bilateral UE venous duplex US and repeat echocardiogram to reassess the heart function.   Will call with the results and to make further plan of management  Meanwhile pt to f/u with PCP for the somatic symptoms work up, ie thyroid Fx etc.    Ike Peraza, DO

## 2024-02-24 LAB
ATRIAL RATE: 58 BPM
ATRIAL RATE: 75 BPM
P AXIS: 29 DEGREES
P AXIS: 8 DEGREES
P OFFSET: 186 MS
P OFFSET: 194 MS
P ONSET: 139 MS
P ONSET: 151 MS
PR INTERVAL: 136 MS
PR INTERVAL: 166 MS
Q ONSET: 219 MS
Q ONSET: 222 MS
QRS COUNT: 12 BEATS
QRS COUNT: 9 BEATS
QRS DURATION: 76 MS
QRS DURATION: 82 MS
QT INTERVAL: 388 MS
QT INTERVAL: 446 MS
QTC CALCULATION(BAZETT): 433 MS
QTC CALCULATION(BAZETT): 437 MS
QTC FREDERICIA: 417 MS
QTC FREDERICIA: 440 MS
R AXIS: 15 DEGREES
R AXIS: 27 DEGREES
T AXIS: 19 DEGREES
T AXIS: 39 DEGREES
T OFFSET: 416 MS
T OFFSET: 442 MS
VENTRICULAR RATE: 58 BPM
VENTRICULAR RATE: 75 BPM

## 2024-02-27 ENCOUNTER — APPOINTMENT (OUTPATIENT)
Dept: HEMATOLOGY/ONCOLOGY | Facility: HOSPITAL | Age: 49
End: 2024-02-27
Payer: COMMERCIAL

## 2024-03-01 ENCOUNTER — APPOINTMENT (OUTPATIENT)
Dept: LAB | Facility: LAB | Age: 49
End: 2024-03-01
Payer: COMMERCIAL

## 2024-03-01 ENCOUNTER — HOSPITAL ENCOUNTER (OUTPATIENT)
Dept: RADIOLOGY | Facility: HOSPITAL | Age: 49
Discharge: HOME | End: 2024-03-01
Payer: COMMERCIAL

## 2024-03-01 DIAGNOSIS — I87.1 SVC SYNDROME: ICD-10-CM

## 2024-03-01 PROCEDURE — 93970 EXTREMITY STUDY: CPT

## 2024-03-05 ENCOUNTER — TELEPHONE (OUTPATIENT)
Dept: CARDIOLOGY | Facility: HOSPITAL | Age: 49
End: 2024-03-05

## 2024-03-05 ENCOUNTER — HOSPITAL ENCOUNTER (OUTPATIENT)
Dept: CARDIOLOGY | Facility: CLINIC | Age: 49
Discharge: HOME | End: 2024-03-05
Payer: COMMERCIAL

## 2024-03-05 DIAGNOSIS — Z92.21 HISTORY OF CHEMOTHERAPY: ICD-10-CM

## 2024-03-05 DIAGNOSIS — I87.1 SVC SYNDROME: ICD-10-CM

## 2024-03-05 LAB
AORTIC VALVE MEAN GRADIENT: 5 MMHG
AORTIC VALVE PEAK VELOCITY: 1.53 M/S
AV PEAK GRADIENT: 9.4 MMHG
AVA (PEAK VEL): 2.38 CM2
AVA (VTI): 2.05 CM2
EJECTION FRACTION APICAL 4 CHAMBER: 81
LEFT VENTRICLE INTERNAL DIMENSION DIASTOLE: 4 CM (ref 3.5–6)
LEFT VENTRICULAR OUTFLOW TRACT DIAMETER: 2.1 CM
MITRAL VALVE E/A RATIO: 1.46

## 2024-03-05 PROCEDURE — 93306 TTE W/DOPPLER COMPLETE: CPT | Performed by: INTERNAL MEDICINE

## 2024-03-05 PROCEDURE — 93306 TTE W/DOPPLER COMPLETE: CPT

## 2024-03-05 PROCEDURE — 2500000004 HC RX 250 GENERAL PHARMACY W/ HCPCS (ALT 636 FOR OP/ED): Performed by: INTERNAL MEDICINE

## 2024-03-05 RX ADMIN — HUMAN ALBUMIN MICROSPHERES AND PERFLUTREN 0.5 ML: 10; .22 INJECTION, SOLUTION INTRAVENOUS at 10:55

## 2024-04-01 RX ORDER — PANTOPRAZOLE SODIUM 20 MG/1
40 TABLET, DELAYED RELEASE ORAL DAILY
Qty: 180 TABLET | Refills: 3 | Status: SHIPPED | OUTPATIENT
Start: 2024-04-01 | End: 2025-03-27

## 2024-04-12 ENCOUNTER — TELEPHONE (OUTPATIENT)
Dept: RADIATION ONCOLOGY | Facility: HOSPITAL | Age: 49
End: 2024-04-12
Payer: COMMERCIAL

## 2024-04-12 NOTE — TELEPHONE ENCOUNTER
Called pt to remind of appointment on 4/15/2024 at 3:30 Pt answered and will be present.    Pt is aware that the appointment is a phone/virtual visit, pt. understands that he/she doesn't have to show up in office.

## 2024-04-15 ENCOUNTER — HOSPITAL ENCOUNTER (OUTPATIENT)
Dept: RADIATION ONCOLOGY | Facility: HOSPITAL | Age: 49
Setting detail: RADIATION/ONCOLOGY SERIES
Discharge: HOME | End: 2024-04-15
Payer: COMMERCIAL

## 2024-04-15 ENCOUNTER — APPOINTMENT (OUTPATIENT)
Dept: RADIATION ONCOLOGY | Facility: CLINIC | Age: 49
End: 2024-04-15
Payer: COMMERCIAL

## 2024-04-15 DIAGNOSIS — C83.30 DIFFUSE LARGE B-CELL LYMPHOMA, UNSPECIFIED BODY REGION (MULTI): Primary | ICD-10-CM

## 2024-04-15 PROCEDURE — 99214 OFFICE O/P EST MOD 30 MIN: CPT | Performed by: NURSE PRACTITIONER

## 2024-04-15 ASSESSMENT — ENCOUNTER SYMPTOMS
GASTROINTESTINAL NEGATIVE: 1
FEVER: 0
ARTHRALGIAS: 1
APPETITE CHANGE: 0
NEUROLOGICAL NEGATIVE: 1
UNEXPECTED WEIGHT CHANGE: 0
CARDIOVASCULAR NEGATIVE: 1
RESPIRATORY NEGATIVE: 1
CHILLS: 0
FATIGUE: 0
ACTIVITY CHANGE: 0
PSYCHIATRIC NEGATIVE: 1
MYALGIAS: 1
DIAPHORESIS: 0
HEMATOLOGIC/LYMPHATIC NEGATIVE: 1

## 2024-04-15 NOTE — PROGRESS NOTES
"  Patient ID: [id number]   Lymphoma, diagnosed in August 2022 after a fall and a pathological right humeral fracture at the end of July. Reports right arm \"deep bone pain\" for several months  prior. S/p right arm open reduction internal fixation in August.   13 x 3 x 4 cm mass involving the mid and proximal humeral diaphysis   Not double expresser, no MYC translocation, final specification diffuse large B-cell lymphoma NOS.   Ki-67 80%  r-IPI score of 2, considered favorable, with 80% projected 4 years BFS.   As primary bone lymphoma, stage, and age appears most prognostic.     PET scan 11 August 2022 showed uptake in the right humerus, left posterior pleura, left iliac bone and left external iliac lymph nodes.      Received first cycle R-CHOP on 2 Sept 2022.  Right arm radiation September 12 to September 23, 2022 in 10 fractions.    Developed painful disease in the left lateral chest region. Went ahead and treated this site palliatively.   Treatment Area #1  Location : l lateral chest  Dates : 10/3/2022 to 10/17/2022  Number of Treatments : 10  Dose : 3000  Modality : photons    Treated left gluteal region for indolent lymphoma   Treatment Area #1  Location : L gluteal  Dates : 3/2-3/22/2023  Number of Treatments : 15  Dose : 3000cGy  Modality : photons    History of presenting illness    Telehealth visit with Sandra Meredith today. is a 49 y.o. female who presents today for follow up 1 year and 1 months s/p RT to the L gluteal for indolent lymphoma. She was treated in 2022 to the right arm and left lateral chest. Previously followed by Dr. Porter. Patient states she is doing well. Energy is great. 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  Appetite is good. She has intentionally lost some weight. Denies fevers, chills or night sweats. Only concern is pain she has been having for the past two months on the left side over her ribs. States it is different from " area previously treated. She thought it was a pulled muscle but feels it should have gone away by now. Reminds her of when this previously happened in her hip. Stretch and massage helped minimally. Feels at rest along with movement.  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 She was started on Apixaban. There was discussion of discontinuing however, she went to the ED three weeks ago with extreme fatigue like when initially diagnosed. Workup negative but decided to keep her on low dose for now.  Scheduled for scans and follow up with Dr. Maier in June.       Review of systems:  Review of Systems   Constitutional:  Negative for activity change, appetite change, chills, diaphoresis, fatigue, fever and unexpected weight change.   HENT: Negative.     Respiratory: Negative.     Cardiovascular: Negative.    Gastrointestinal: Negative.    Genitourinary: Negative.    Musculoskeletal:  Positive for arthralgias and myalgias.        Left chest   Skin: Negative.    Neurological: Negative.    Hematological: Negative.    Psychiatric/Behavioral: Negative.         Past Medical history  She  has a past surgical history that includes Other surgical history (08/12/2022); Other surgical history (08/12/2022); and US guided biopsy lymph node superficial (1/30/2023).        Plan:  Assessment/Plan     49 year old female s/p RT to right arm, left lateral chest and left gluteal for history of lymphoma. Patient is doing well over all. Only complaint is left sided pain that she has had for the last 2 months. Discuss moving up scans with Dr. Maier. He recommended ordering a PET scan. Orders placed. Will call with results and follow up. Dr. Maier moving up follow up appt as well. Continue follow up with other providers as  scheduled. Follow up with be based on PET results. Number given and instructed to call with any questions or concerns.     I performed this visit using real- time telehealth tools , including an audio/video or telephone connection between Sandra Meredith , who is in their home and Jaqueline Lares CNP at [facility].   Problem List Items Addressed This Visit    None

## 2024-04-25 ENCOUNTER — TELEPHONE (OUTPATIENT)
Dept: HEMATOLOGY/ONCOLOGY | Facility: HOSPITAL | Age: 49
End: 2024-04-25
Payer: COMMERCIAL

## 2024-04-25 ENCOUNTER — PATIENT MESSAGE (OUTPATIENT)
Dept: HEMATOLOGY/ONCOLOGY | Facility: HOSPITAL | Age: 49
End: 2024-04-25
Payer: COMMERCIAL

## 2024-04-25 DIAGNOSIS — R94.31 ABNORMAL ECG: ICD-10-CM

## 2024-04-25 DIAGNOSIS — G43.909 MIGRAINE WITHOUT STATUS MIGRAINOSUS, NOT INTRACTABLE, UNSPECIFIED MIGRAINE TYPE: ICD-10-CM

## 2024-04-25 DIAGNOSIS — Z92.21 STATUS POST ADMINISTRATION OF CARDIOTOXIC CHEMOTHERAPY: ICD-10-CM

## 2024-04-25 DIAGNOSIS — R00.0 TACHYCARDIA, UNSPECIFIED: ICD-10-CM

## 2024-04-25 DIAGNOSIS — I87.1 SVC SYNDROME: ICD-10-CM

## 2024-04-25 DIAGNOSIS — G43.711 CHRONIC MIGRAINE WITHOUT AURA, INTRACTABLE, WITH STATUS MIGRAINOSUS: ICD-10-CM

## 2024-04-25 RX ORDER — APIXABAN 2.5 MG/1
2.5 TABLET, FILM COATED ORAL 2 TIMES DAILY
Qty: 60 TABLET | Refills: 2 | Status: SHIPPED | OUTPATIENT
Start: 2024-04-25 | End: 2024-04-25 | Stop reason: SDUPTHER

## 2024-04-25 RX ORDER — APIXABAN 2.5 MG/1
2.5 TABLET, FILM COATED ORAL 2 TIMES DAILY
Qty: 60 TABLET | Refills: 2 | Status: SHIPPED | OUTPATIENT
Start: 2024-04-25 | End: 2024-07-24

## 2024-04-25 NOTE — TELEPHONE ENCOUNTER
Sandra calls today to state that she went to the pharmacy to  her Eliquis refill and she was told that it needs a prior authorization. Patient took her last pill yesterday and is requesting an expedited PA.  Message sent to provider and  Specialty Pharmacy.

## 2024-04-26 ENCOUNTER — SPECIALTY PHARMACY (OUTPATIENT)
Dept: PHARMACY | Facility: CLINIC | Age: 49
End: 2024-04-26

## 2024-04-26 ENCOUNTER — TELEPHONE (OUTPATIENT)
Dept: ADMISSION | Facility: HOSPITAL | Age: 49
End: 2024-04-26
Payer: COMMERCIAL

## 2024-04-26 RX ORDER — ATOGEPANT 60 MG/1
60 TABLET ORAL DAILY
Qty: 30 TABLET | Refills: 6 | Status: SHIPPED | OUTPATIENT
Start: 2024-04-26

## 2024-04-26 RX ORDER — GABAPENTIN 300 MG/1
600 CAPSULE ORAL 2 TIMES DAILY
Qty: 120 CAPSULE | Refills: 3 | Status: SHIPPED | OUTPATIENT
Start: 2024-04-26

## 2024-04-26 RX ORDER — METOPROLOL SUCCINATE 100 MG/1
100 TABLET, EXTENDED RELEASE ORAL 2 TIMES DAILY
Qty: 180 TABLET | Refills: 0 | Status: SHIPPED | OUTPATIENT
Start: 2024-04-26 | End: 2024-07-25

## 2024-04-26 RX ORDER — PANTOPRAZOLE SODIUM 20 MG/1
20 TABLET, DELAYED RELEASE ORAL DAILY
Qty: 90 TABLET | Refills: 3 | Status: SHIPPED | OUTPATIENT
Start: 2024-04-26 | End: 2025-04-21

## 2024-04-26 RX ORDER — SPIRONOLACTONE 25 MG/1
25 TABLET ORAL DAILY
Qty: 90 TABLET | Refills: 0 | Status: SHIPPED | OUTPATIENT
Start: 2024-04-26 | End: 2024-07-25

## 2024-04-26 NOTE — TELEPHONE ENCOUNTER
Patient is calling in to request a 14 day supply of Eliquis be sent in to the [facility] pharmacy on file. Patient states that she is waiting on a PA for her original prescription to be filled, but is now out of the medication.

## 2024-04-26 NOTE — TELEPHONE ENCOUNTER
[facility]   Last appointment 2/6/24  Next appointment 8/6/24    Sent to MD

## 2024-04-26 NOTE — PROGRESS NOTES
Medication access team has been in contact with Ms. Meredith and is working on delivery. See phone note from this morning.

## 2024-04-29 ENCOUNTER — ONCOLOGY MEDICATION OUTREACH (OUTPATIENT)
Dept: HEMATOLOGY/ONCOLOGY | Facility: HOSPITAL | Age: 49
End: 2024-04-29
Payer: COMMERCIAL

## 2024-04-29 DIAGNOSIS — C83.30 DIFFUSE LARGE B-CELL LYMPHOMA, UNSPECIFIED BODY REGION (MULTI): Primary | ICD-10-CM

## 2024-04-29 NOTE — PROGRESS NOTES
ONCOLOGY CLINICAL PHARMACY NOTE     Subjective  Sandra Meredith is a 49 y.o. female with DLBCL, here for refill support.        Treatment history  [No matching plan found]     Objective  There were no vitals taken for this visit.  Lab Results   Component Value Date    WBC 7.0 02/19/2024    HGB 14.9 02/19/2024    HCT 43.1 02/19/2024    MCV 81 02/19/2024     02/19/2024      Lab Results   Component Value Date    GLUCOSE 110 (H) 02/19/2024    CALCIUM 10.0 02/19/2024     02/19/2024    K 3.7 02/19/2024    CO2 27 02/19/2024     02/19/2024    BUN 12 02/19/2024    CREATININE 0.96 02/19/2024     Lab Results   Component Value Date    ALT 23 02/19/2024    AST 22 02/19/2024    ALKPHOS 91 02/19/2024    BILITOT 0.5 02/19/2024       Allergies and Medications   Allergies   Allergen Reactions    Adhes. Band-Tape-Benzalkonium Hives    Hi-B-12 Hives    Lovenox [Enoxaparin] Hives    Neosporin Daily Body Hives    Nickel Hives    Zithromax Tri-Devin [Azithromycin] Hives       Current Outpatient Medications:     acetaminophen (Tylenol) 500 mg tablet, Take by mouth., Disp: , Rfl:     apixaban (Eliquis) 2.5 mg tablet, Take 1 tablet (2.5 mg) by mouth 2 times a day for 14 days., Disp: 28 tablet, Rfl: 0    ascorbic acid (Vitamin C) 500 mg tablet, Take by mouth., Disp: , Rfl:     atogepant (Qulipta) 60 mg tablet tablet, Take 1 tablet (60 mg) by mouth once daily., Disp: 30 tablet, Rfl: 6    calcium carbonate 500 mg calcium (1,250 mg) chewable tablet, Chew 1 tablet (1,250 mg) once daily., Disp: , Rfl:     cholecalciferol (Vitamin D-3) 50 MCG (2000 UT) tablet, Take 1 tablet (2,000 Units) by mouth once daily., Disp: , Rfl:     cyclobenzaprine (Flexeril) 10 mg tablet, Take by mouth., Disp: , Rfl:     Eliquis 2.5 mg tablet, Take 1 tablet (2.5 mg) by mouth 2 times a day., Disp: 60 tablet, Rfl: 2    FeroSuL tablet, Take 1 tablet by mouth 2 times a day., Disp: , Rfl:     folic acid (Folvite) 1 mg tablet, Take 1 tablet (1 mg) by mouth once  daily., Disp: , Rfl:     gabapentin (Neurontin) 300 mg capsule, Take 2 capsules (600 mg) by mouth 2 times a day., Disp: 120 capsule, Rfl: 3    garlic 500 mg capsule, Take by mouth., Disp: , Rfl:     lisdexamfetamine (Vyvanse) 20 mg capsule, Take 1 capsule (20 mg) by mouth once daily in the morning., Disp: 30 capsule, Rfl: 0    lisdexamfetamine (Vyvanse) 20 mg capsule, Take 1 capsule (20 mg) by mouth once daily in the morning. Do not start before March 5, 2024., Disp: 30 capsule, Rfl: 0    lisdexamfetamine (Vyvanse) 20 mg capsule, Take 1 capsule (20 mg) by mouth once daily in the morning. Do not start before April 4, 2024. (Patient not taking: Reported on 2/21/2024 Do not start before April 4, 2024.), Disp: 30 capsule, Rfl: 0    magnesium, amino acid chelate, 133 mg tablet, Take 1 tablet (133 mg) by mouth 2 times a day., Disp: , Rfl:     metoprolol succinate XL (Toprol-XL) 100 mg 24 hr tablet, Take 1 tablet (100 mg) by mouth 2 times a day., Disp: 180 tablet, Rfl: 0    omega-3-dha-epa-dpa-fish oil (Omega MonoPure) 860-260-1,300 mg capsule,delayed release(DR/EC), Take by mouth., Disp: , Rfl:     pantoprazole (ProtoNix) 20 mg EC tablet, Take 2 tablets (40 mg) by mouth once daily., Disp: , Rfl:     potassium gluconate 595 mg (99 mg) tablet, Take 1 tablet by mouth once daily., Disp: , Rfl:     sertraline (Zoloft) 25 mg tablet, Take 1.5 tablets (37.5 mg) by mouth once daily., Disp: , Rfl:     spironolactone (Aldactone) 25 mg tablet, Take 1 tablet (25 mg) by mouth once daily., Disp: 90 tablet, Rfl: 0    thiamine 100 mg tablet, Take 1 tablet (100 mg) by mouth once daily., Disp: , Rfl:     Assessment and Plan  Sandra Meredith is a 49 y.o. female with DLBCL, to be treated with apixaban.    Per Dr. Maier's authorization, on 4/29/2024, I refilled apixaban 2.5 mg twice daily. #28, 0 RF. Prescription sent to [facility] in [place], [place]; for bridge supply while awaiting mail order prescription.     Eric Griffin, JeannineD

## 2024-05-02 ENCOUNTER — HOSPITAL ENCOUNTER (OUTPATIENT)
Dept: RADIOLOGY | Facility: CLINIC | Age: 49
Discharge: HOME | End: 2024-05-02
Payer: COMMERCIAL

## 2024-05-02 DIAGNOSIS — C83.30 DIFFUSE LARGE B-CELL LYMPHOMA, UNSPECIFIED BODY REGION (MULTI): ICD-10-CM

## 2024-05-02 PROCEDURE — 78815 PET IMAGE W/CT SKULL-THIGH: CPT | Mod: PS

## 2024-05-02 PROCEDURE — A9552 F18 FDG: HCPCS | Performed by: NURSE PRACTITIONER

## 2024-05-02 PROCEDURE — 78815 PET IMAGE W/CT SKULL-THIGH: CPT | Mod: PET TUMOR SUBSQ TX STRATEGY | Performed by: STUDENT IN AN ORGANIZED HEALTH CARE EDUCATION/TRAINING PROGRAM

## 2024-05-02 PROCEDURE — 3430000001 HC RX 343 DIAGNOSTIC RADIOPHARMACEUTICALS: Performed by: NURSE PRACTITIONER

## 2024-05-02 RX ORDER — FLUDEOXYGLUCOSE F 18 200 MCI/ML
11.3 INJECTION, SOLUTION INTRAVENOUS
Status: COMPLETED | OUTPATIENT
Start: 2024-05-02 | End: 2024-05-02

## 2024-05-02 RX ADMIN — FLUDEOXYGLUCOSE F 18 11.3 MILLICURIE: 200 INJECTION, SOLUTION INTRAVENOUS at 09:11

## 2024-05-06 ENCOUNTER — TELEPHONE (OUTPATIENT)
Dept: RADIATION ONCOLOGY | Facility: HOSPITAL | Age: 49
End: 2024-05-06
Payer: COMMERCIAL

## 2024-05-06 NOTE — TELEPHONE ENCOUNTER
Called patient to discuss recent PET results. TAYLOR on scan. Patient scheduled for CT CAP the beginning of June that will most likely be rescheduled after her appt tomorrow with Dr. Maier. Will order next follow up based on next CT ordered by Dr. Maier. Instructed to call with any questions or concerns.      NM PET CT bone skull base to mid thigh 05/02/2024    Narrative  Interpreted By:  Susan Farfan and Benza Andrew  STUDY:  NM PET CT SKULL BASE TO MID THIGH;  5/2/2024 10:15 am    INDICATION:  Signs/Symptoms:History of diffuse large cell lymphoma and follicular  lymphoma s/p RT. Left rib pain x 2 months. Evaluate for recurrence..    COMPARISON:  PET-CT dated 04/14/2023    ACCESSION NUMBER(S):  [id number]    ORDERING CLINICIAN:  CARLOS MONTES    TECHNIQUE:  DIVISION OF NUCLEAR MEDICINE  POSITRON EMISSION TOMOGRAPHY (PET-CT)    The patient received an intravenous dose of 11.3 mCi of Fluorine-18  fluorodeoxyglucose (FDG). Positron emission tomographic (PET) images  from skull base to the mid thighs were then acquired after a one hour  delay. Also acquired was a contemporaneous low dose non-contrast CT  scan performed for attenuation correction of PET images and anatomic  localization.  The PET and CT images were digitally fused for  display.  All images were acquired on a combined PET-CT scanner unit.  Some areas of FDG accumulation may be described in standardized  uptake value (SUV) units.    CODING:  Subsequent Treatment Strategy (PS)    CALIBRATION:  Dose Injection-to-Scan Interval (mins): 56 min  Mediastinal bloodpool SUV (normal 1.5-2.5): 2.5  Blood glucose: 118 mg/dL    FINDINGS:  NECK:  * No evidence of paranasal sinus disease  * No FDG avid cervical lymphadenopathy is present.  *Unremarkable thyroid gland    CHEST:  * No concerning pulmonary nodule  * No FDG avid mediastinal, hilar or axillary lymphadenopathy.  *Unremarkable both breasts.    ABDOMEN AND PELVIS:  * No evidence of hypermetabolic  lymphadenopathy.  * Physiologic radiotracer uptake is present in the liver and spleen  with excretion into the bowel loops and the genitourinary tract.  Spleen is normal in size with FDG uptake lower than liver.  *Unremarkable bilateral adrenal glands    MUSCULOSKELETAL/EXTREMITIES:  *No concerning FDG avid bone lesion throughout axial and appendicular  skeleton to suggest osseous metastasis.    Impression  1. No PET evidence of FDG avid luis enrique or extranodal disease. Deauville  1.    I personally reviewed the images/study and I agree with the findings  as stated above by resident physician, Jarod Sam MD. This study  was interpreted at [facility],  [place], [place].    MACRO:  None    Signed by: Susan Farfan 5/4/2024 9:50 AM  Dictation workstation:   [id number]

## 2024-05-07 ENCOUNTER — TELEMEDICINE (OUTPATIENT)
Dept: HEMATOLOGY/ONCOLOGY | Facility: HOSPITAL | Age: 49
End: 2024-05-07
Payer: COMMERCIAL

## 2024-05-07 PROCEDURE — 3008F BODY MASS INDEX DOCD: CPT | Performed by: INTERNAL MEDICINE

## 2024-05-07 PROCEDURE — 99213 OFFICE O/P EST LOW 20 MIN: CPT | Performed by: INTERNAL MEDICINE

## 2024-05-07 NOTE — PROGRESS NOTES
"Patient ID: Sandra Meredith is a 49 y.o. female.  Virtual or Telephone Consent    A telephone visit (audio only) between the patient (at the originating site) and the provider (at the distant site) was utilized to provide this telehealth service.   Verbal consent was requested and obtained from Sandra Meredith on this date, 05/07/24 for a telehealth visit.    Oncologic history:  Lymphoma, diagnosed in August 2022 after a fall and a pathological right humeral fracture at the end of July. Reports right arm \"deep bone pain\" for several months  prior. S/p right arm open reduction internal fixation in August.   13 x 3 x 4 cm mass involving the mid and proximal humeral diaphysis   Not double expresser, no MYC translocation, final specification diffuse large B-cell lymphoma NOS.   Ki-67 80%  r-IPI score of 2, considered favorable, with 80% projected 4 years BFS.   As primary bone lymphoma, stage, and age appears most prognostic.     PET scan 11 August 2022 showed uptake in the right humerus, left posterior pleura, left iliac bone and left external iliac lymph nodes.      Received first cycle R-CHOP on 2 Sept 2022.  Right arm radiation September 12 to September 23, 2022 in 10 fractions.  We do not recommend further radiation to any other sites of disease  Cycle 2 R-CHOP given on September 23, 2022  -Restaging PET scan denied by insurance.  Planning CT chest abdomen pelvis after cycle 3 of chemotherapy  -Cycle 3 R-CHOP on 10/14/2022  -Cycle 4 R-CHOP given 11/4/2022.  Tolerating well, asymptomatic.  Left chest wall pain resolved.  CT scan of the -Chest abdomen pelvis November 8, 2022 showed sclerotic bone changes but could not elucidate the bone disease accurately, L4 lytic area under  observation, no intra-abdominal disease, no colitis colitis, check radiculitis.     -Evaluation 11/23/2022 tolerating well, epigastric discomfort intermittent chronic and stable.  On PPI.  Occasional loose stools sending C. difficile and enteric " pathogens.  CT showed abundance of stools in the colon suggest Metamucil to regulate her  bowel movement.  She is taking probiotics as well.     -Cycle 5 R-CHOP November 25, 2022  -Cycle 6 R-CHOP 12/16/2022  -End of therapy PET scan shows a CR except for a Douville 4 residual small focal activity in the left gluteus muscle at site of prior lymphoma mass.  -Discussion at tumor board January 2023, plan to attempt at biopsy of residual activity in the left gluteus muscle area under CT or ultrasound guidance  -Evaluation January 18, 2023: Increased dyspnea on minimal exertion, CTA negative for PE, concerned about filling defect around the catheter tip, not mentioned in the report, could be artifact and flow effect, recommended that patient go to the emergency  room for venous Doppler of the bilateral IJ and subclavian veins.  -Admitted mid-February 2023 with progressive SVC syndrome, on anticoagulation, enoxaparin, to keep for now additional 2 to 3 weeks and then transition to apixaban.  Mild elevation in anticardiolipin and beta-2 glycoprotein 1 antibody, repeat.  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  Planning radiation therapy, starting 3/2/2023 in [place].  -Plan evaluation for possible CAR-T therapy.  Other options include observation or rituximab maintenance as we would normally do in treating [place] patients after 6 cycles of R-CHOP.  -Radiation therapy to residual activity left pelvic/gluteus area completed March 22, 2023, 30 Marroquin delivered.  -Planned for SVC thrombectomy by interventional radiology the week of March 27, 2023.  -Evaluation March 22, 2023, doing well, rash secondary to enoxaparin, switch to apixaban, plan to evaluate mid May 2023 with PET scan.  Patient is about 3 months out from the end of her chemoimmunotherapy.  -Plan to be evaluated April 2023 by Dr. Grover for  consideration of CAR-T consolidation.  -PET scan April 14 2023 no residual lymphoma.   -Met with Dr. Grover, decided against CAR-T and to watch expectantly at this time.  -CT scan chest abdomen pelvis on August 16, 2023 no evidence of disease  -Evaluation 8/26/2023: Asymptomatic no evidence of disease clinically radiographically or by labs.  Plan to see back in 4 months with CBC CMP LDH CT chest abdomen pelvis and ESR.  Plan to talk over the phone in 1-3 to 4 weeks after a D-dimer and echocardiogram  and to discuss option of reducing apixaban to 2.5 mg p.o. twice daily to finish a year of anticoagulation before final discontinuation.   -Phone visit 9/20/2023, reviewed D-dimer, normal, instructed to reduce apixaban to 2.5 mg p.o. twice daily for another 6 months through March 2024.  -Evaluation December 20, 2023: 1 year since end of therapy in December 2023, doing well, recent imaging December 13, 2023 shows no evidence of disease stable sclerotic bone changes.  -Developed an episode of chest pain and dyspnea February 2024 a day before she was supposed to stop apixaban and transition to aspirin, stay on the same dose of apixaban, CTA of the chest showed no venous thromboembolism. ?Component of anxiety.  Discussed with her on the phone visit 5/7/2024 and we can address apixaban duration when we see her in June although I personally do not think she needs to continue this indefinitely and could probably easily transition to aspirin, to be discussed in June.  -PET scan 5/2/2024, no evidence of disease.  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 twice daily September 2023, plan to discontinue and switch to 162 mg of aspirin March 2024.  Iron deficiency, no documented bleeding, following with GI.  To discuss with patient GI follow-up  Subjective    HPI  Patient is feeling well now.  She had an episode of dyspnea and chest  "discomfort in February that \"aborted\" the plan to discontinue apixaban and switch her to aspirin.  She still on apixaban.  She stayed on the same dose of 2.5 mg p.o. twice daily and her symptoms disappeared anyway.  CTA at the time showed no evidence of venous thromboembolism.    Objective    BSA: There is no height or weight on file to calculate BSA.  There were no vitals taken for this visit.     Physical Exam  Virtual visit  Performance Status:  Symptomatic; fully ambulatory      Assessment/Plan   DLBCL, primarily bone disease, 21 months out from diagnosis with no evidence of disease.    SVC thrombosis/SVC syndrome in the context of chemoimmunotherapy and port.    Line removed and she completed over a year of anticoagulation    Will discuss the duration but she appears to be ambivalent about stopping and appears to have had some anxiety about stopping as she developed an episode of chest pain and dyspnea the night before she was plan to stop the medication.      Cancer Staging   No matching staging information was found for the patient.      Oncology History    No history exists.        There are no diagnoses linked to this encounter.         Connie Maier MD            "

## 2024-05-14 ENCOUNTER — TELEPHONE (OUTPATIENT)
Dept: NEUROLOGY | Facility: CLINIC | Age: 49
End: 2024-05-14
Payer: COMMERCIAL

## 2024-05-14 DIAGNOSIS — F98.8 OTHER SPECIFIED BEHAVIORAL AND EMOTIONAL DISORDERS WITH ONSET USUALLY OCCURRING IN CHILDHOOD AND ADOLESCENCE: ICD-10-CM

## 2024-05-14 RX ORDER — LISDEXAMFETAMINE DIMESYLATE CAPSULES 20 MG/1
20 CAPSULE ORAL EVERY MORNING
Qty: 30 CAPSULE | Refills: 0 | Status: SHIPPED | OUTPATIENT
Start: 2024-06-13

## 2024-05-14 RX ORDER — LISDEXAMFETAMINE DIMESYLATE CAPSULES 20 MG/1
20 CAPSULE ORAL EVERY MORNING
Qty: 30 CAPSULE | Refills: 0 | Status: SHIPPED | OUTPATIENT
Start: 2024-05-14

## 2024-05-14 RX ORDER — LISDEXAMFETAMINE DIMESYLATE CAPSULES 20 MG/1
20 CAPSULE ORAL EVERY MORNING
Qty: 30 CAPSULE | Refills: 0 | Status: SHIPPED | OUTPATIENT
Start: 2024-07-12

## 2024-05-14 NOTE — TELEPHONE ENCOUNTER
Called to refill lisdexamfetamine (Vyvanse) 20 mg capsule Qty 30.  Stated she was given (3) last time.  Pharmacy is   [id number] - [address] Phone: [phone]

## 2024-05-16 NOTE — TELEPHONE ENCOUNTER
DIAGNOSIS: (B) Legs Pain / No Imaging / Self.Refer / [employer]     APPOINTMENT DATE: 5.16.24   NOTES STATUS DETAILS   OFFICE NOTE from other specialist Internal 3.1.23 + more  Adarsh  PT    11.4.22  FLAKO Kitchen   MEDICATION LIST Internal    XRAYS (IMAGES & REPORTS) Internal 11.4.22  XR Knee Right            RIGHT C7 SNRB    NO AUTH REQUIRED    OK to schedule procedure approved as above. Please note sides/levels approved and date range.    (If applicable, sides/levels approved may differ from those ordered)    TO BE SCHEDULED WITH DR. Rusty Jean

## 2024-06-03 ENCOUNTER — OFFICE VISIT (OUTPATIENT)
Dept: CARDIOLOGY | Facility: CLINIC | Age: 49
End: 2024-06-03
Payer: COMMERCIAL

## 2024-06-03 VITALS
OXYGEN SATURATION: 95 % | DIASTOLIC BLOOD PRESSURE: 87 MMHG | RESPIRATION RATE: 17 BRPM | TEMPERATURE: 97.7 F | SYSTOLIC BLOOD PRESSURE: 124 MMHG | HEART RATE: 77 BPM | BODY MASS INDEX: 38.61 KG/M2 | WEIGHT: 239.2 LBS

## 2024-06-03 DIAGNOSIS — C83.30 DIFFUSE LARGE B-CELL LYMPHOMA, UNSPECIFIED BODY REGION (MULTI): ICD-10-CM

## 2024-06-03 DIAGNOSIS — R00.2 PALPITATIONS: ICD-10-CM

## 2024-06-03 DIAGNOSIS — E78.2 MIXED HYPERLIPIDEMIA: ICD-10-CM

## 2024-06-03 DIAGNOSIS — Z92.21 STATUS POST ADMINISTRATION OF CARDIOTOXIC CHEMOTHERAPY: ICD-10-CM

## 2024-06-03 DIAGNOSIS — Z87.891 FORMER SMOKER: ICD-10-CM

## 2024-06-03 DIAGNOSIS — R07.89 ATYPICAL CHEST PAIN: ICD-10-CM

## 2024-06-03 DIAGNOSIS — I87.1 SVC SYNDROME: Primary | ICD-10-CM

## 2024-06-03 PROCEDURE — 99214 OFFICE O/P EST MOD 30 MIN: CPT | Performed by: INTERNAL MEDICINE

## 2024-06-03 PROCEDURE — 3008F BODY MASS INDEX DOCD: CPT | Performed by: INTERNAL MEDICINE

## 2024-06-03 PROCEDURE — 1036F TOBACCO NON-USER: CPT | Performed by: INTERNAL MEDICINE

## 2024-06-03 ASSESSMENT — ENCOUNTER SYMPTOMS
GASTROINTESTINAL NEGATIVE: 1
DIZZINESS: 1
CONSTITUTIONAL NEGATIVE: 1
RESPIRATORY NEGATIVE: 1
HEMATOLOGIC/LYMPHATIC NEGATIVE: 1
SHORTNESS OF BREATH: 0
PALPITATIONS: 0
PSYCHIATRIC NEGATIVE: 1
ARTHRALGIAS: 1

## 2024-06-03 ASSESSMENT — PAIN SCALES - GENERAL: PAINLEVEL: 0-NO PAIN

## 2024-06-03 NOTE — PATIENT INSTRUCTIONS
No change in medications - contact Dr. Jamia CROW and ask about their recommendations for your anticoagulation given the vein abnormality they saw on your SVC.     Echo in April 2025  See me after Echo.

## 2024-06-03 NOTE — PROGRESS NOTES
Patient:  Sandra Meredith  YOB: 1975  MRN: [id number]       Chief Complaint/Active Symptoms:       Sandra Meredith is a 49 y.o. female who returns today for cardiac follow-up.    Doing well. Had ER visit in February and started on oral anticoagulation.     Cancer Diagnosis: B cell lymphoma, stage IV (DLCBL). Diagnosed 8/2022 with pathologic right arm fracture.   Oncologist: Connie Maier  Treatment: RCHOP - started 9/2022, completed 6 cycles.XRT to left pelvic/gluteal - completed 3/2023 - 30 Gray  Radiation: Radiation therapy to right arm 9/2022 in 10 fractions.    Review of Systems   Constitutional: Negative.    HENT: Negative.     Respiratory: Negative.  Negative for shortness of breath.    Cardiovascular:  Positive for chest pain. Negative for palpitations and leg swelling.   Gastrointestinal: Negative.    Genitourinary: Negative.    Musculoskeletal:  Positive for arthralgias.   Neurological:  Positive for dizziness.   Hematological: Negative.    Psychiatric/Behavioral: Negative.     All other systems reviewed and are negative.      Objective:     Vitals:    06/03/24 1544   BP: 124/87   Pulse: 77   Resp: 17   Temp: 36.5 °C (97.7 °F)   SpO2: 95%       Vitals:    06/03/24 1544   Weight: 109 kg (239 lb 3.2 oz)       Allergies:     Allergies   Allergen Reactions    Adhes. Band-Tape-Benzalkonium Hives    Hi-B-12 Hives    Lovenox [Enoxaparin] Hives    Neosporin Daily Body Hives    Nickel Hives    Zithromax Tri-Devin [Azithromycin] Hives          Medications:     Current Outpatient Medications   Medication Instructions    acetaminophen (Tylenol) 500 mg tablet oral    apixaban (ELIQUIS) 2.5 mg, oral, 2 times daily    ascorbic acid (Vitamin C) 500 mg tablet oral    calcium carbonate 500 mg calcium (1,250 mg) chewable tablet 1 tablet, oral, Daily    cholecalciferol (Vitamin D-3) 50 MCG (2000 UT) tablet 1 tablet, oral, Daily    cyclobenzaprine (Flexeril) 10 mg tablet oral    Eliquis 2.5 mg, oral, 2 times daily     FeroSuL tablet 1 tablet, oral, 2 times daily    folic acid (FOLVITE) 1 mg, oral, Daily    gabapentin (NEURONTIN) 600 mg, oral, 2 times daily    garlic 500 mg capsule oral    [START ON 6/13/2024] lisdexamfetamine (VYVANSE) 20 mg, oral, Every morning    [START ON 7/12/2024] lisdexamfetamine (VYVANSE) 20 mg, oral, Every morning    lisdexamfetamine (VYVANSE) 20 mg, oral, Every morning    magnesium, amino acid chelate, 133 mg tablet 1 tablet, oral, 2 times daily    metoprolol succinate XL (TOPROL-XL) 100 mg, oral, 2 times daily    omega-3-dha-epa-dpa-fish oil (Omega MonoPure) 860-260-1,300 mg capsule,delayed release(DR/EC) oral    pantoprazole (PROTONIX) 40 mg, oral, Daily    potassium gluconate 595 mg (99 mg) tablet 1 tablet, oral, Daily    Qulipta 60 mg, oral, Daily    sertraline (ZOLOFT) 37.5 mg, oral, Daily    spironolactone (ALDACTONE) 25 mg, oral, Daily    thiamine (VITAMIN B-1) 100 mg, oral, Daily       Physical Examination:   GENERAL:  Well developed, well nourished, in no acute distress.  HEENT: NC AT, EOMI with anicteric sclera  NECK:  Supple, no JVD, no bruit.  CHEST:  Symmetric and nontender.  LUNGS:  Clear to auscultation bilaterally, normal respiratory effort.  HEART: PMI is nonpalpable.  There is a regular rhythm with a normal S1 and S2 no S3.  There is trace peripheral edema with venous varicosities and spider veins  ABDOMEN: Soft, NT, ND without palpable organomegaly or bruits  EXTREMITIES:  Warm with good color, no clubbing or cyanosis.  There is tr edema noted.  PERIPHERAL VASCULAR:  Pulses present and equally palpable;   MUSCULOSKELETAL: Mild osteoarthritic changes  NEURO/PSYCH:  Alert and oriented times three with approppriate behavior and responses. Nonfocal motor examination with normal gait and ambulation  Lymph: No significant palpable lymphadenopathy  Skin: no rash or lesions on exposed skin or reported.    Lab:     CBC:   Lab Results   Component Value Date    WBC 7.0 02/19/2024    RBC 5.33 (H)  "02/19/2024    HGB 14.9 02/19/2024    HCT 43.1 02/19/2024     02/19/2024        CMP:    Lab Results   Component Value Date     02/19/2024    K 3.7 02/19/2024     02/19/2024    CO2 27 02/19/2024    BUN 12 02/19/2024    CREATININE 0.96 02/19/2024    GLUCOSE 110 (H) 02/19/2024    CALCIUM 10.0 02/19/2024       Magnesium:    Lab Results   Component Value Date    MG 2.08 02/19/2024       Lipid Profile:    No results found for: \"CHLPL\", \"TRIG\", \"HDL\", \"LDLCALC\", \"LDLDIRECT\"    TSH:    Lab Results   Component Value Date    TSH 1.88 03/02/2023       BNP:   Lab Results   Component Value Date    BNP 43 02/19/2024        PT/INR:    Lab Results   Component Value Date    PROTIME 12.1 02/19/2024    INR 1.1 02/19/2024       HgBA1c:    No results found for: \"HGBA1C\"    BMP:  Lab Results   Component Value Date     02/19/2024     12/20/2023     09/14/2023     08/23/2023     08/16/2023    K 3.7 02/19/2024    K 4.3 12/20/2023    K 4.2 09/14/2023    K 4.1 08/23/2023    K 4.2 08/16/2023     02/19/2024     12/20/2023     09/14/2023    CL 99 08/23/2023     08/16/2023    CO2 27 02/19/2024    CO2 26 12/20/2023    CO2 26 09/14/2023    CO2 24 08/23/2023    CO2 28 08/16/2023    BUN 12 02/19/2024    BUN 15 12/20/2023    BUN 14 09/14/2023    BUN 16 08/23/2023    BUN 16 08/16/2023    CREATININE 0.96 02/19/2024    CREATININE 0.87 12/20/2023    CREATININE 0.83 09/14/2023    CREATININE 0.85 08/23/2023    CREATININE 0.80 08/16/2023       Cardiac Enzymes:    Lab Results   Component Value Date    TROPHS 5 02/19/2024    TROPHS 5 02/19/2024    TROPHS 20 02/20/2023       Hepatic Function Panel:    Lab Results   Component Value Date    ALKPHOS 91 02/19/2024    ALT 23 02/19/2024    AST 22 02/19/2024    PROT 7.6 02/19/2024    BILITOT 0.5 02/19/2024    BILIDIR 0.0 02/19/2024         Diagnostic Studies:     Transthoracic Echo (TTE) Complete    Result Date: 3/5/2024              [facility]" Heart [address], [address], [place] [address]          Tel [phone] Fax [phone] TRANSTHORACIC ECHOCARDIOGRAM REPORT  Patient Name:      ZANE WANG        Reading Physician:    [id number] Roman Reddy MD, [facility] Study Date:        3/5/2024             Ordering Provider:    [id number] YAYA GERBER MRN/PID:           [id number]             Fellow: Accession#:        [id number]         Nurse: Date of Birth/Age: 1975 / 48 years Sonographer:          Chanelle Calderon                                                               RDCS, RDMS, RVT Gender:            F                    Additional Staff: Height:            167.64 cm            Admit Date: Weight:            111.13 kg            Admission Status:     Outpatient BSA / BMI:         2.18 m2 / 39.54      Department Location:  [facility]/m2                                      [facility] Study Type:    TRANSTHORACIC ECHO (TTE) COMPLETE Diagnosis/ICD: Personal history of antineoplastic chemotherapy-Z92.21;                Compression of vein-I87.1 Indication:    HX SVC Syndrome, HX B Cell Lymphoma w/Chemo & Radiation CPT Codes:     Echo Complete w Full Doppler-[id number]  Study Detail: The following Echo studies were performed: 2D, M-Mode, Doppler and               color flow. Optison used as a contrast agent for endocardial               border definition. Total contrast used for this procedure was 0.5               mL via IV push.  PHYSICIAN INTERPRETATION: Left Ventricle: Left ventricular systolic function is normal, with an estimated ejection fraction of 65-70%. There are no regional wall motion abnormalities. The left ventricular cavity size is normal. The left ventricular septal wall thickness is normal. There is normal left ventricular posterior wall thickness. Spectral Doppler  shows a normal pattern of left ventricular diastolic filling. No strain imaging was done on this study. Left Atrium: The left atrium is normal in size. Left atrial volume index 27.7 mL/m2. Right Ventricle: The right ventricle is normal in size. There is normal right ventricular global systolic function. Normal right ventricular chamber size and function. Right Atrium: The right atrium is normal in size. Aortic Valve: The aortic valve is trileaflet. There is no evidence of aortic valve regurgitation. The peak instantaneous gradient of the aortic valve is 9.4 mmHg. The mean gradient of the aortic valve is 5.0 mmHg. Mitral Valve: The mitral valve is normal in structure. There is trace mitral valve regurgitation. Tricuspid Valve: The tricuspid valve is structurally normal. There is trace tricuspid regurgitation. The right ventricular systolic pressure is unable to be estimated. Trivial tricuspid regurgitation. Pulmonic Valve: The pulmonic valve is structurally normal. There is trace pulmonic valve regurgitation. Pericardium: There is no pericardial effusion noted. Aorta: The aortic root is normal. Systemic Veins: The inferior vena cava was not well visualized.  CONCLUSIONS:  1. Left ventricular systolic function is normal with a 65-70% estimated ejection fraction.  2. Normal right ventricular chamber size and function.  3. Trivial tricuspid regurgitation.  4. Comparison study dated September 14, 2023 showed estimated LV ejection fraction 55 to 60%. LV GLS -19.0%. Trivial MR and TR. QUANTITATIVE DATA SUMMARY: 2D MEASUREMENTS:                          Normal Ranges: Ao Root d:     3.20 cm   (2.0-3.7cm) LAs:           3.50 cm   (2.7-4.0cm) RVIDd:         2.86 cm   (0.9-3.6cm) IVSd:          1.17 cm   (0.6-1.1cm) LVPWd:         1.17 cm   (0.6-1.1cm) LVIDd:         4.00 cm   (3.9-5.9cm) LVIDs:         2.33 cm LV Mass Index: 73.1 g/m2 LV % FS        41.8 % AORTA MEASUREMENTS:                    Normal Ranges: Asc Ao, d: 3.00  cm (2.1-3.4cm) LV SYSTOLIC FUNCTION BY 2D PLANIMETRY (MOD):                     Normal Ranges: EF-A4C View: 81.0 % (>=55%) LV DIASTOLIC FUNCTION:                        Normal Ranges: MV Peak E:    0.94 m/s (0.7-1.2 m/s) MV Peak A:    0.64 m/s (0.42-0.7 m/s) E/A Ratio:    1.46     (1.0-2.2) MV lateral e' 0.16 m/s MV medial e'  0.12 m/s MITRAL VALVE:                      Normal Ranges: MV Vmax:    1.08 m/s (<=1.3m/s) MV peak P.7 mmHg (<5mmHg) MV mean P.0 mmHg (<48mmHg) MV DT:      236 msec (150-240msec) AORTIC VALVE:                                   Normal Ranges: AoV Vmax:                1.53 m/s (<=1.7m/s) AoV Peak P.4 mmHg (<20mmHg) AoV Mean P.0 mmHg (1.7-11.5mmHg) LVOT Max Albino:            1.05 m/s (<=1.1m/s) AoV VTI:                 31.20 cm (18-25cm) LVOT VTI:                18.50 cm LVOT Diameter:           2.10 cm  (1.8-2.4cm) AoV Area, VTI:           2.05 cm2 (2.5-5.5cm2) AoV Area,Vmax:           2.38 cm2 (2.5-4.5cm2) AoV Dimensionless Index: 0.59 PULMONIC VALVE:                      Normal Ranges: PV Max Albino: 0.7 m/s  (0.6-0.9m/s) PV Max P.0 mmHg  [id number] Roman Reddy MD, FACC Electronically signed on 3/5/2024 at 1:31:12 PM  ** Final **        Radiology:     No orders to display     ASSESSMENT     Problem List Items Addressed This Visit       B-cell lymphoma (Multi)    Relevant Orders    Follow Up In Cardiology    Hyperlipidemia    SVC syndrome - Primary    Status post administration of cardiotoxic chemotherapy    Relevant Orders    Onco-Echo Complete (Strain & 3D)    Follow Up In Cardiology    RESOLVED: Palpitations    Relevant Orders    Follow Up In Cardiology    Former smoker    RESOLVED: Atypical chest pain       PLAN   1.  B-cell lymphoma with history of cardiotoxic drug therapy.  Patient's total dose was 310 mg/m² of doxorubicin.  She is doing well with an echocardiogram that does not show any LV dysfunction and she has no signs or symptoms of heart  failure.  Would recommend that she return in a year after an echocardiogram for follow-up unless she develops interval symptoms.  2.  Atypical chest pain.  The patient is very nervous about stopping her anticoagulation given some atypical chest discomfort that led to an ER visit about a month ago.  She has been seen for SVC syndrome she is worried about thrombosis I have asked her to discuss with vascular cardiology who saw her for her SVC syndrome whether they feel its appropriate for her to remain on anticoagulation as her oncologist is recommended that it can be discontinued.  Her symptoms or not suggestive of angina pectoris and at this time I would follow her conservatively.  3.  SVC syndrome.  Had venous venogram of the upper extremities that is read as being normal but she describes being told about some abnormality there I have referred her back to the original physicians to discuss any questions related to that.  4.  Hyperlipidemia.  At this point in time I recommended diet exercise and weight loss as she has no coronary calcifications.  That will change as time goes on and she gets older.    As discussed we will see her in follow-up in a years time sooner if there is any questions or concerns.

## 2024-06-06 ENCOUNTER — OFFICE VISIT (OUTPATIENT)
Dept: FAMILY MEDICINE CLINIC | Age: 49
End: 2024-06-06
Payer: COMMERCIAL

## 2024-06-06 VITALS
WEIGHT: 244 LBS | BODY MASS INDEX: 39.21 KG/M2 | DIASTOLIC BLOOD PRESSURE: 62 MMHG | HEART RATE: 73 BPM | HEIGHT: 66 IN | SYSTOLIC BLOOD PRESSURE: 128 MMHG | TEMPERATURE: 97.3 F | OXYGEN SATURATION: 97 %

## 2024-06-06 DIAGNOSIS — C85.10 B-CELL LYMPHOMA, UNSPECIFIED B-CELL LYMPHOMA TYPE, UNSPECIFIED BODY REGION (HCC): ICD-10-CM

## 2024-06-06 DIAGNOSIS — E66.01 CLASS 3 SEVERE OBESITY WITH SERIOUS COMORBIDITY IN ADULT, UNSPECIFIED BMI, UNSPECIFIED OBESITY TYPE (HCC): ICD-10-CM

## 2024-06-06 DIAGNOSIS — K21.9 GASTROESOPHAGEAL REFLUX DISEASE, UNSPECIFIED WHETHER ESOPHAGITIS PRESENT: ICD-10-CM

## 2024-06-06 DIAGNOSIS — F32.A ANXIETY AND DEPRESSION: ICD-10-CM

## 2024-06-06 DIAGNOSIS — N91.2 AMENORRHEA: ICD-10-CM

## 2024-06-06 DIAGNOSIS — Z13.220 LIPID SCREENING: ICD-10-CM

## 2024-06-06 DIAGNOSIS — E11.9 TYPE 2 DIABETES MELLITUS WITHOUT COMPLICATION, UNSPECIFIED WHETHER LONG TERM INSULIN USE (HCC): Primary | ICD-10-CM

## 2024-06-06 DIAGNOSIS — F41.9 ANXIETY AND DEPRESSION: ICD-10-CM

## 2024-06-06 PROCEDURE — 99214 OFFICE O/P EST MOD 30 MIN: CPT | Performed by: FAMILY MEDICINE

## 2024-06-06 RX ORDER — PHENOL 1.4 %
AEROSOL, SPRAY (ML) MUCOUS MEMBRANE
COMMUNITY

## 2024-06-06 RX ORDER — SERTRALINE HYDROCHLORIDE 25 MG/1
37.5 TABLET, FILM COATED ORAL DAILY
Qty: 30 TABLET | COMMUNITY
Start: 2024-06-06

## 2024-06-06 SDOH — ECONOMIC STABILITY: FOOD INSECURITY: WITHIN THE PAST 12 MONTHS, YOU WORRIED THAT YOUR FOOD WOULD RUN OUT BEFORE YOU GOT MONEY TO BUY MORE.: NEVER TRUE

## 2024-06-06 SDOH — ECONOMIC STABILITY: INCOME INSECURITY: HOW HARD IS IT FOR YOU TO PAY FOR THE VERY BASICS LIKE FOOD, HOUSING, MEDICAL CARE, AND HEATING?: NOT VERY HARD

## 2024-06-06 SDOH — ECONOMIC STABILITY: FOOD INSECURITY: WITHIN THE PAST 12 MONTHS, THE FOOD YOU BOUGHT JUST DIDN'T LAST AND YOU DIDN'T HAVE MONEY TO GET MORE.: NEVER TRUE

## 2024-06-06 SDOH — ECONOMIC STABILITY: TRANSPORTATION INSECURITY
IN THE PAST 12 MONTHS, HAS LACK OF TRANSPORTATION KEPT YOU FROM MEETINGS, WORK, OR FROM GETTING THINGS NEEDED FOR DAILY LIVING?: NO

## 2024-06-06 ASSESSMENT — PATIENT HEALTH QUESTIONNAIRE - PHQ9
SUM OF ALL RESPONSES TO PHQ9 QUESTIONS 1 & 2: 0
9. THOUGHTS THAT YOU WOULD BE BETTER OFF DEAD, OR OF HURTING YOURSELF: NOT AT ALL
5. POOR APPETITE OR OVEREATING: NOT AT ALL
3. TROUBLE FALLING OR STAYING ASLEEP: SEVERAL DAYS
SUM OF ALL RESPONSES TO PHQ QUESTIONS 1-9: 2
8. MOVING OR SPEAKING SO SLOWLY THAT OTHER PEOPLE COULD HAVE NOTICED. OR THE OPPOSITE - BEING SO FIDGETY OR RESTLESS THAT YOU HAVE BEEN MOVING AROUND A LOT MORE THAN USUAL: NOT AT ALL
7. TROUBLE CONCENTRATING ON THINGS, SUCH AS READING THE NEWSPAPER OR WATCHING TELEVISION: NOT AT ALL
1. LITTLE INTEREST OR PLEASURE IN DOING THINGS: NOT AT ALL
10. IF YOU CHECKED OFF ANY PROBLEMS, HOW DIFFICULT HAVE THESE PROBLEMS MADE IT FOR YOU TO DO YOUR WORK, TAKE CARE OF THINGS AT HOME, OR GET ALONG WITH OTHER PEOPLE: SOMEWHAT DIFFICULT
8. MOVING OR SPEAKING SO SLOWLY THAT OTHER PEOPLE COULD HAVE NOTICED. OR THE OPPOSITE, BEING SO FIGETY OR RESTLESS THAT YOU HAVE BEEN MOVING AROUND A LOT MORE THAN USUAL: NOT AT ALL
SUM OF ALL RESPONSES TO PHQ QUESTIONS 1-9: 2
SUM OF ALL RESPONSES TO PHQ QUESTIONS 1-9: 2
1. LITTLE INTEREST OR PLEASURE IN DOING THINGS: NOT AT ALL
9. THOUGHTS THAT YOU WOULD BE BETTER OFF DEAD, OR OF HURTING YOURSELF: NOT AT ALL
10. IF YOU CHECKED OFF ANY PROBLEMS, HOW DIFFICULT HAVE THESE PROBLEMS MADE IT FOR YOU TO DO YOUR WORK, TAKE CARE OF THINGS AT HOME, OR GET ALONG WITH OTHER PEOPLE: SOMEWHAT DIFFICULT
SUM OF ALL RESPONSES TO PHQ QUESTIONS 1-9: 2
2. FEELING DOWN, DEPRESSED OR HOPELESS: NOT AT ALL
SUM OF ALL RESPONSES TO PHQ QUESTIONS 1-9: 2
6. FEELING BAD ABOUT YOURSELF - OR THAT YOU ARE A FAILURE OR HAVE LET YOURSELF OR YOUR FAMILY DOWN: NOT AT ALL
2. FEELING DOWN, DEPRESSED OR HOPELESS: NOT AT ALL
7. TROUBLE CONCENTRATING ON THINGS, SUCH AS READING THE NEWSPAPER OR WATCHING TELEVISION: NOT AT ALL
4. FEELING TIRED OR HAVING LITTLE ENERGY: SEVERAL DAYS
3. TROUBLE FALLING OR STAYING ASLEEP: SEVERAL DAYS
6. FEELING BAD ABOUT YOURSELF - OR THAT YOU ARE A FAILURE OR HAVE LET YOURSELF OR YOUR FAMILY DOWN: NOT AT ALL
4. FEELING TIRED OR HAVING LITTLE ENERGY: SEVERAL DAYS
5. POOR APPETITE OR OVEREATING: NOT AT ALL

## 2024-06-06 NOTE — PROGRESS NOTES
Chief Complaint   Patient presents with    6 Month Follow-Up     Patient reports for the last month, congestion & drainage that alternates nostrils. Has been better in the last 3 weeks but would like something to help    Skin Problem     Has a spot on left upper arm that she would like checked.         HPI: Simi JANE Praveenor 49 y.o. female presenting for     Anxiety depression   Will see the psychology/psych - [facility] in [place]   Patient will be on klonopin BID PRN - did not have to thierno it since the biopsy.   And will start the Zoloft 25 mg daily   Stable at this time.   Plans to start the PTSD treatment.    F/u   Patient is doing well on the zoloft on 1.5 tablets of hte zoloft.   Doing great. Denies any Si or HI  Patient is taking vyvanse and quilipta.      Skin lesion   Alexia would like to have a skin check   Had a spot on the left arm appear recently   Started off as a black dot but now has some scabs and erythema.   Denies any javier in the area.         Type 2 diabetes   New diagnosis   Has improved her diet   Unsure if it is steroid induce (from the chemotherapy and steroids).   Alexia does not check her sugars at home.     F/u   Would like to hold off on testing a1c due to recent use of steroids (to treat the cancer).     Hemoglobin A1C   Date Value Ref Range Status   12/06/2023 6.6 (H) 4.8 - 5.9 % Final     Lymphoma B cell diffuse   Had her 5 treatment recently.    Is on steroids   Does exercise 10 minutes on the recumbent bike does elevate the heart rate.     F/u   Finishing up chemo   Still on steroids   Follows up with oncology.     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    Alexia is looking at other treatments to help wit hthe cancer treatments.    Patient rpeorts taht she was having swelling and difficulty breathing earlier in the January.

## 2024-06-12 ENCOUNTER — LAB (OUTPATIENT)
Dept: LAB | Facility: HOSPITAL | Age: 49
End: 2024-06-12
Payer: COMMERCIAL

## 2024-06-12 ENCOUNTER — OFFICE VISIT (OUTPATIENT)
Dept: HEMATOLOGY/ONCOLOGY | Facility: HOSPITAL | Age: 49
End: 2024-06-12
Payer: COMMERCIAL

## 2024-06-12 VITALS
HEART RATE: 63 BPM | BODY MASS INDEX: 39.38 KG/M2 | OXYGEN SATURATION: 99 % | WEIGHT: 244 LBS | RESPIRATION RATE: 16 BRPM | SYSTOLIC BLOOD PRESSURE: 106 MMHG | TEMPERATURE: 97 F | DIASTOLIC BLOOD PRESSURE: 68 MMHG

## 2024-06-12 DIAGNOSIS — C83.30 DIFFUSE LARGE B-CELL LYMPHOMA, UNSPECIFIED BODY REGION (MULTI): Primary | ICD-10-CM

## 2024-06-12 DIAGNOSIS — C83.30 DIFFUSE LARGE B-CELL LYMPHOMA, UNSPECIFIED BODY REGION (MULTI): ICD-10-CM

## 2024-06-12 LAB
ALBUMIN SERPL BCP-MCNC: 4.5 G/DL (ref 3.4–5)
ALP SERPL-CCNC: 84 U/L (ref 33–110)
ALT SERPL W P-5'-P-CCNC: 22 U/L (ref 7–45)
ANION GAP SERPL CALC-SCNC: 16 MMOL/L (ref 10–20)
AST SERPL W P-5'-P-CCNC: 17 U/L (ref 9–39)
B2 MICROGLOB SERPL-MCNC: 1.7 MG/L (ref 0.7–2.2)
BASOPHILS # BLD AUTO: 0.03 X10*3/UL (ref 0–0.1)
BASOPHILS NFR BLD AUTO: 0.5 %
BILIRUB SERPL-MCNC: 0.4 MG/DL (ref 0–1.2)
BUN SERPL-MCNC: 19 MG/DL (ref 6–23)
CALCIUM SERPL-MCNC: 9.1 MG/DL (ref 8.6–10.3)
CHLORIDE SERPL-SCNC: 105 MMOL/L (ref 98–107)
CO2 SERPL-SCNC: 24 MMOL/L (ref 21–32)
CREAT SERPL-MCNC: 0.81 MG/DL (ref 0.5–1.05)
EGFRCR SERPLBLD CKD-EPI 2021: 90 ML/MIN/1.73M*2
EOSINOPHIL # BLD AUTO: 0.05 X10*3/UL (ref 0–0.7)
EOSINOPHIL NFR BLD AUTO: 0.8 %
ERYTHROCYTE [DISTWIDTH] IN BLOOD BY AUTOMATED COUNT: 13.5 % (ref 11.5–14.5)
ERYTHROCYTE [SEDIMENTATION RATE] IN BLOOD BY WESTERGREN METHOD: 32 MM/H (ref 0–20)
GLUCOSE SERPL-MCNC: 131 MG/DL (ref 74–99)
HCT VFR BLD AUTO: 43.3 % (ref 36–46)
HGB BLD-MCNC: 14.3 G/DL (ref 12–16)
IGA SERPL-MCNC: 109 MG/DL (ref 70–400)
IGG SERPL-MCNC: 742 MG/DL (ref 700–1600)
IGM SERPL-MCNC: 52 MG/DL (ref 40–230)
IMM GRANULOCYTES # BLD AUTO: 0.01 X10*3/UL (ref 0–0.7)
IMM GRANULOCYTES NFR BLD AUTO: 0.2 % (ref 0–0.9)
LDH SERPL L TO P-CCNC: 179 U/L (ref 84–246)
LYMPHOCYTES # BLD AUTO: 1.51 X10*3/UL (ref 1.2–4.8)
LYMPHOCYTES NFR BLD AUTO: 25.3 %
MAGNESIUM SERPL-MCNC: 2.1 MG/DL (ref 1.6–2.4)
MCH RBC QN AUTO: 26.9 PG (ref 26–34)
MCHC RBC AUTO-ENTMCNC: 33 G/DL (ref 32–36)
MCV RBC AUTO: 82 FL (ref 80–100)
MONOCYTES # BLD AUTO: 0.47 X10*3/UL (ref 0.1–1)
MONOCYTES NFR BLD AUTO: 7.9 %
NEUTROPHILS # BLD AUTO: 3.9 X10*3/UL (ref 1.2–7.7)
NEUTROPHILS NFR BLD AUTO: 65.3 %
NRBC BLD-RTO: 0 /100 WBCS (ref 0–0)
PLATELET # BLD AUTO: 282 X10*3/UL (ref 150–450)
POTASSIUM SERPL-SCNC: 4.1 MMOL/L (ref 3.5–5.3)
PROT SERPL-MCNC: 6.7 G/DL (ref 6.4–8.2)
PROT SERPL-MCNC: 7.5 G/DL (ref 6.4–8.2)
RBC # BLD AUTO: 5.31 X10*6/UL (ref 4–5.2)
SODIUM SERPL-SCNC: 141 MMOL/L (ref 136–145)
WBC # BLD AUTO: 6 X10*3/UL (ref 4.4–11.3)

## 2024-06-12 PROCEDURE — 36415 COLL VENOUS BLD VENIPUNCTURE: CPT

## 2024-06-12 PROCEDURE — 82565 ASSAY OF CREATININE: CPT

## 2024-06-12 PROCEDURE — 82232 ASSAY OF BETA-2 PROTEIN: CPT

## 2024-06-12 PROCEDURE — 99214 OFFICE O/P EST MOD 30 MIN: CPT | Performed by: INTERNAL MEDICINE

## 2024-06-12 PROCEDURE — 83521 IG LIGHT CHAINS FREE EACH: CPT

## 2024-06-12 PROCEDURE — 85652 RBC SED RATE AUTOMATED: CPT

## 2024-06-12 PROCEDURE — 3008F BODY MASS INDEX DOCD: CPT | Performed by: INTERNAL MEDICINE

## 2024-06-12 PROCEDURE — 86334 IMMUNOFIX E-PHORESIS SERUM: CPT

## 2024-06-12 PROCEDURE — 83615 LACTATE (LD) (LDH) ENZYME: CPT

## 2024-06-12 PROCEDURE — 83735 ASSAY OF MAGNESIUM: CPT

## 2024-06-12 PROCEDURE — 85025 COMPLETE CBC W/AUTO DIFF WBC: CPT

## 2024-06-12 PROCEDURE — 82784 ASSAY IGA/IGD/IGG/IGM EACH: CPT

## 2024-06-12 PROCEDURE — 84155 ASSAY OF PROTEIN SERUM: CPT | Mod: 59

## 2024-06-12 ASSESSMENT — PAIN SCALES - GENERAL: PAINLEVEL_OUTOF10: 0-NO PAIN

## 2024-06-13 DIAGNOSIS — G43.711 CHRONIC MIGRAINE WITHOUT AURA, INTRACTABLE, WITH STATUS MIGRAINOSUS: ICD-10-CM

## 2024-06-13 LAB
KAPPA LC SERPL-MCNC: 0.98 MG/DL (ref 0.33–1.94)
KAPPA LC/LAMBDA SER: 0.92 {RATIO} (ref 0.26–1.65)
LAMBDA LC SERPL-MCNC: 1.07 MG/DL (ref 0.57–2.63)

## 2024-06-14 NOTE — PROGRESS NOTES
"Patient ID: Sandra Meredith is a 49 y.o. female.  Oncologic history:  Lymphoma, diagnosed in August 2022 after a fall and a pathological right humeral fracture at the end of July. Reports right arm \"deep bone pain\" for several months  prior. S/p right arm open reduction internal fixation in August.   13 x 3 x 4 cm mass involving the mid and proximal humeral diaphysis   Not double expresser, no MYC translocation, final specification diffuse large B-cell lymphoma NOS.   Ki-67 80%  r-IPI score of 2, considered favorable, with 80% projected 4 years BFS.   As primary bone lymphoma, stage, and age appears most prognostic.     PET scan 11 August 2022 showed uptake in the right humerus, left posterior pleura, left iliac bone and left external iliac lymph nodes.      Received first cycle R-CHOP on 2 Sept 2022.  Right arm radiation September 12 to September 23, 2022 in 10 fractions.  We do not recommend further radiation to any other sites of disease  Cycle 2 R-CHOP given on September 23, 2022  -Restaging PET scan denied by insurance.  Planning CT chest abdomen pelvis after cycle 3 of chemotherapy  -Cycle 3 R-CHOP on 10/14/2022  -Cycle 4 R-CHOP given 11/4/2022.  Tolerating well, asymptomatic.  Left chest wall pain resolved.  CT scan of the -Chest abdomen pelvis November 8, 2022 showed sclerotic bone changes but could not elucidate the bone disease accurately, L4 lytic area under  observation, no intra-abdominal disease, no colitis colitis, check radiculitis.     -Evaluation 11/23/2022 tolerating well, epigastric discomfort intermittent chronic and stable.  On PPI.  Occasional loose stools sending C. difficile and enteric pathogens.  CT showed abundance of stools in the colon suggest Metamucil to regulate her  bowel movement.  She is taking probiotics as well.     -Cycle 5 R-CHOP November 25, 2022  -Cycle 6 R-CHOP 12/16/2022  -End of therapy PET scan shows a CR except for a [place] 4 residual small focal activity in the left " gluteus muscle at site of prior lymphoma mass.  -Discussion at tumor board January 2023, plan to attempt at biopsy of residual activity in the left gluteus muscle area under CT or ultrasound guidance  -Evaluation January 18, 2023: Increased dyspnea on minimal exertion, CTA negative for PE, concerned about filling defect around the catheter tip, not mentioned in the report, could be artifact and flow effect, recommended that patient go to the emergency  room for venous Doppler of the bilateral IJ and subclavian veins.  -Admitted mid-February 2023 with progressive SVC syndrome, on anticoagulation, enoxaparin, to keep for now additional 2 to 3 weeks and then transition to apixaban.  Mild elevation in anticardiolipin and beta-2 glycoprotein 1 antibody, repeat.  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  Planning radiation therapy, starting 3/2/2023 in [place].  -Plan evaluation for possible CAR-T therapy.  Other options include observation or rituximab maintenance as we would normally do in treating [place] patients after 6 cycles of R-CHOP.  -Radiation therapy to residual activity left pelvic/gluteus area completed March 22, 2023, 30 Marroquin delivered.  -Planned for SVC thrombectomy by interventional radiology the week of March 27, 2023.  -Evaluation March 22, 2023, doing well, rash secondary to enoxaparin, switch to apixaban, plan to evaluate mid May 2023 with PET scan.  Patient is about 3 months out from the end of her chemoimmunotherapy.  -Plan to be evaluated April 2023 by Dr. Grover for consideration of CAR-T consolidation.  -PET scan April 14 2023 no residual lymphoma.   -Met with Dr. Grover, decided against CAR-T and to watch expectantly at this time.  -CT scan chest abdomen pelvis on August 16, 2023 no evidence of disease  -Evaluation 8/26/2023: Asymptomatic no evidence of disease  clinically radiographically or by labs.  Plan to see back in 4 months with CBC CMP LDH CT chest abdomen pelvis and ESR.  Plan to talk over the phone in 1-3 to 4 weeks after a D-dimer and echocardiogram  and to discuss option of reducing apixaban to 2.5 mg p.o. twice daily to finish a year of anticoagulation before final discontinuation.   -Phone visit 9/20/2023, reviewed D-dimer, normal, instructed to reduce apixaban to 2.5 mg p.o. twice daily for another 6 months through March 2024.  -Evaluation December 20, 2023: 1 year since end of therapy in December 2023, doing well, recent imaging December 13, 2023 shows no evidence of disease stable sclerotic bone changes.  -Evaluation 6/13/2024, 1 and half years out from end of therapy in December 2023, asymptomatic.  PET scan on May 2, 2024 showed no evidence of disease.  Episode of lightheadedness February 2024, no PE by CTA, continued on apixaban.    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 twice daily September 2023, plan to discontinue and switch to 162 mg of aspirin March 2024.  Iron deficiency, no documented bleeding, following with GI.  To discuss with patient GI follow-up  Subjective    HPI  Feels well and is asymptomatic.  No new pain or adenopathy.  Continues apixaban 2.5 mg twice daily.  She had an episode of lightheadedness in February this year, went to [facility] ER.  Currently has some fatigue and tiredness.  No other complaints.    Review of Systems    Head and neck: Other than HPI negative  CNS: Other than HPI negative  Cardiovascular: Other than HPI negative  Pulmonary: Other than HPI negative  GI: Other than HPI negative  : Other than HPI negative  Bleeding: Other than HPI negative  Constitutional: Other than HPI negative     Objective    BSA: 2.27 meters squared  /68 (BP Location: Left arm, Patient Position: Sitting, BP Cuff Size: Adult)   Pulse 63   Temp  36.1 °C (97 °F) (Skin)   Resp 16   Wt 111 kg (244 lb)   SpO2 99%   BMI 39.38 kg/m²      Physical Exam  Alert oriented not in distress not pale or jaundiced  Lymph nodes: No adenopathy  Oral mucosa negative, no mucositis  Head is normocephalic atraumatic, pupils equal reactive  Neck is supple no adenopathy, no thyromegaly  Lungs show equal air entry, no crackles or wheezing, equal percussion  Heart shows regular rate and rhythm normal S1-S2 no murmurs or gallop rhythm  Abdomen is soft nontender, moves with respiration, no hepatosplenomegaly or masses, positive bowel sounds, not distended.  Lower extremities show no edema  Neurologically grossly nonfocal  Psych: Normal affect      Current Outpatient Medications on File Prior to Visit   Medication Sig Dispense Refill    acetaminophen (Tylenol) 500 mg tablet Take by mouth.      ascorbic acid (Vitamin C) 500 mg tablet Take by mouth.      atogepant (Qulipta) 60 mg tablet tablet Take 1 tablet (60 mg) by mouth once daily. 30 tablet 6    calcium carbonate 500 mg calcium (1,250 mg) chewable tablet Chew 1 tablet (1,250 mg) once daily.      cholecalciferol (Vitamin D-3) 50 MCG (2000 UT) tablet Take 1 tablet (2,000 Units) by mouth once daily.      cyclobenzaprine (Flexeril) 10 mg tablet Take by mouth.      Eliquis 2.5 mg tablet Take 1 tablet (2.5 mg) by mouth 2 times a day. 60 tablet 2    folic acid (Folvite) 1 mg tablet Take 1 tablet (1 mg) by mouth once daily.      gabapentin (Neurontin) 300 mg capsule Take 2 capsules (600 mg) by mouth 2 times a day. 120 capsule 3    garlic 500 mg capsule Take by mouth.      lisdexamfetamine (Vyvanse) 20 mg capsule Take 1 capsule (20 mg) by mouth once daily in the morning. Do not fill before June 13, 2024. 30 capsule 0    [START ON 7/12/2024] lisdexamfetamine (Vyvanse) 20 mg capsule Take 1 capsule (20 mg) by mouth once daily in the morning. Do not fill before July 12, 2024. 30 capsule 0    lisdexamfetamine (Vyvanse) 20 mg capsule Take 1  capsule (20 mg) by mouth once daily in the morning. 30 capsule 0    magnesium, amino acid chelate, 133 mg tablet Take 1 tablet (133 mg) by mouth 2 times a day.      metoprolol succinate XL (Toprol-XL) 100 mg 24 hr tablet Take 1 tablet (100 mg) by mouth 2 times a day. 180 tablet 0    omega-3-dha-epa-dpa-fish oil (Omega MonoPure) 860-260-1,300 mg capsule,delayed release(DR/EC) Take by mouth.      pantoprazole (ProtoNix) 20 mg EC tablet Take 2 tablets (40 mg) by mouth once daily.      potassium gluconate 595 mg (99 mg) tablet Take 1 tablet by mouth once daily.      sertraline (Zoloft) 25 mg tablet Take 1.5 tablets (37.5 mg) by mouth once daily.      spironolactone (Aldactone) 25 mg tablet Take 1 tablet (25 mg) by mouth once daily. 90 tablet 0    apixaban (Eliquis) 2.5 mg tablet Take 1 tablet (2.5 mg) by mouth 2 times a day for 14 days. 28 tablet 0    thiamine 100 mg tablet Take 1 tablet (100 mg) by mouth once daily.       No current facility-administered medications on file prior to visit.      Performance Status:  Asymptomatic   Latest Reference Range & Units 12/20/23 09:15 02/19/24 17:50 02/19/24 20:38 02/21/24 12:21 06/12/24 10:58   GLUCOSE 74 - 99 mg/dL 130 (H) 110 (H)   131 (H)   SODIUM 136 - 145 mmol/L 140 140   141   POTASSIUM 3.5 - 5.3 mmol/L 4.3 3.7   4.1   CHLORIDE 98 - 107 mmol/L 103 101   105   Bicarbonate 21 - 32 mmol/L 26 27   24   Anion Gap 10 - 20 mmol/L 15 16   16   Blood Urea Nitrogen 6 - 23 mg/dL 15 12   19   Creatinine 0.50 - 1.05 mg/dL 0.87 0.96   0.81   EGFR >60 mL/min/1.73m*2 83 74   90   Calcium 8.6 - 10.3 mg/dL 9.5 10.0   9.1   Albumin 3.4 - 5.0 g/dL 4.5 4.6   4.5   Alkaline Phosphatase 33 - 110 U/L 91 91   84   ALT 7 - 45 U/L 27 23   22   AST 9 - 39 U/L 19 22   17   Bilirubin Total 0.0 - 1.2 mg/dL 0.5 0.5   0.4   Bilirubin, Direct 0.0 - 0.3 mg/dL  0.0      FERRITIN 8 - 150 ng/mL    135    Total Protein 6.4 - 8.2 g/dL  6.4 - 8.2 g/dL 7.3 7.6   6.7  7.5   IRON 35 - 150 ug/dL    60    MAGNESIUM  1.60 - 2.40 mg/dL  2.08   2.10   LDH 84 - 246 U/L 194    179   Lactate 0.4 - 2.0 mmol/L  1.7      BNP 0 - 99 pg/mL  43      TIBC 240 - 445 ug/dL    419    UIBC 110 - 370 ug/dL    359    % Saturation 25 - 45 %    14 (L)    Troponin I, High Sensitivity 0 - 13 ng/L  5 5     D-Dimer Non VTE, Quant (ng/mL FEU) <=500 ng/mL FEU    <215    INR 0.9 - 1.1   1.1      Protime 9.8 - 12.8 seconds  12.1      LEUKOCYTES (10*3/UL) IN BLOOD BY AUTOMATED COUNT, [place] citizen 4.4 - 11.3 x10*3/uL 6.9 7.0   6.0   nRBC 0.0 - 0.0 /100 WBCs 0.0 0.0   0.0   ERYTHROCYTES (10*6/UL) IN BLOOD BY AUTOMATED COUNT, [place] citizen 4.00 - 5.20 x10*6/uL 5.41 (H) 5.33 (H)   5.31 (H)   HEMOGLOBIN 12.0 - 16.0 g/dL 14.6 14.9   14.3   HEMATOCRIT 36.0 - 46.0 % 43.8 43.1   43.3   MCV 80 - 100 fL 81 81   82   MCH 26.0 - 34.0 pg 27.0 28.0   26.9   MCHC 32.0 - 36.0 g/dL 33.3 34.6   33.0   RED CELL DISTRIBUTION WIDTH 11.5 - 14.5 % 14.0 13.3   13.5   PLATELETS (10*3/UL) IN BLOOD AUTOMATED COUNT, [place] citizen 150 - 450 x10*3/uL 319 301   282   NEUTROPHILS/100 LEUKOCYTES IN BLOOD BY AUTOMATED COUNT, [place] citizen 40.0 - 80.0 % 70.1 70.8   65.3   Immature Granulocytes %, Automated 0.0 - 0.9 % 0.3 0.4   0.2   Lymphocytes % 13.0 - 44.0 % 20.1 21.2   25.3   Monocytes % 2.0 - 10.0 % 7.7 6.8   7.9   Eosinophils % 0.0 - 6.0 % 1.2 0.4   0.8   Basophils % 0.0 - 2.0 % 0.6 0.4   0.5   NEUTROPHILS (10*3/UL) IN BLOOD BY AUTOMATED COUNT, [place] citizen 1.20 - 7.70 x10*3/uL 4.83 4.97   3.90   Immature Granulocytes Absolute, Automated 0.00 - 0.70 x10*3/uL 0.02 0.03   0.01   Lymphocytes Absolute 1.20 - 4.80 x10*3/uL 1.38 1.49   1.51   Monocytes Absolute 0.10 - 1.00 x10*3/uL 0.53 0.48   0.47   Eosinophils Absolute 0.00 - 0.70 x10*3/uL 0.08 0.03   0.05   Basophils Absolute 0.00 - 0.10 x10*3/uL 0.04 0.03   0.03   Sed Rate 0 - 20 mm/h 42 (H)    32 (H)   IgG 700 - 1,600 mg/dL     742   IgM 40 - 230 mg/dL     52   IgA 70 - 400 mg/dL     109   Beta-2 Microglobulin 0.7 - 2.2 mg/L     1.7   Ig Kappa Free Light Chain  "0.33 - 1.94 mg/dL     0.98   Ig Lambda Free Light Chain 0.57 - 2.63 mg/dL     1.07   Kappa/Lambda Ratio 0.26 - 1.65      0.92   (H): Data is abnormally high  (L): Data is abnormally low    Assessment/Plan   Large B-cell lymphoma, \"primary bone lymphoma\".  Risk stratification: r-IPI 2, favorable, projected 80% 4-year PFS.  Therapy: Completed R-CHOP chemoimmunotherapy December 2022, patient is 1 and half years out from end of therapy.  Comorbidities:   -SVC syndrome, plans to discontinue in March 2024 withheld due to an episode of lightheadedness \"similar to when she had SVC syndrome\".  Continues low-dose apixaban maintenance 2.5 mg p.o. twice daily.   -Iron deficiency: Repeating iron studies.  Patient is not anemic.  Disease status: No evidence of disease, goals of care is curative intent therapy, completed December 2022  Plan: Follow-up in 6 months with CT chest abdomen pelvis.  Check D-dimer.  Cancer Staging   No matching staging information was found for the patient.      Oncology History    No history exists.        Diagnoses and all orders for this visit:  Diffuse large B-cell lymphoma, unspecified body region (Multi)  -     Clinic Appointment Request Follow up; ABENA ROSA  -     Lactate Dehydrogenase; Future  -     Comprehensive Metabolic Panel; Future  -     CBC and Auto Differential; Future  -     Magnesium; Future  -     Sedimentation Rate; Future  -     Clinic Appointment Request Follow up; ABENA ROSA; Future  -     Lactate Dehydrogenase; Future  -     Comprehensive Metabolic Panel; Future  -     CBC and Auto Differential; Future  -     Magnesium; Future  -     C-Reactive Protein; Future  -     Sedimentation Rate; Future           Abena Rosa MD            "

## 2024-06-17 LAB
ALBUMIN: 4.3 G/DL (ref 3.4–5)
ALPHA 1 GLOBULIN: 0.3 G/DL (ref 0.2–0.6)
ALPHA 2 GLOBULIN: 0.6 G/DL (ref 0.4–1.1)
BETA GLOBULIN: 0.9 G/DL (ref 0.5–1.2)
GAMMA GLOBULIN: 0.6 G/DL (ref 0.5–1.4)
IMMUNOFIXATION COMMENT: NORMAL
PATH REVIEW - SERUM IMMUNOFIXATION: NORMAL
PATH REVIEW-SERUM PROTEIN ELECTROPHORESIS: NORMAL
PROTEIN ELECTROPHORESIS COMMENT: NORMAL

## 2024-07-11 DIAGNOSIS — F98.8 OTHER SPECIFIED BEHAVIORAL AND EMOTIONAL DISORDERS WITH ONSET USUALLY OCCURRING IN CHILDHOOD AND ADOLESCENCE: ICD-10-CM

## 2024-07-11 RX ORDER — LISDEXAMFETAMINE DIMESYLATE CAPSULES 20 MG/1
20 CAPSULE ORAL EVERY MORNING
Qty: 30 CAPSULE | Refills: 0 | Status: SHIPPED | OUTPATIENT
Start: 2024-07-12

## 2024-07-23 DIAGNOSIS — R00.0 TACHYCARDIA, UNSPECIFIED: ICD-10-CM

## 2024-07-24 RX ORDER — METOPROLOL SUCCINATE 100 MG/1
100 TABLET, EXTENDED RELEASE ORAL 2 TIMES DAILY
Qty: 180 TABLET | Refills: 3 | Status: SHIPPED | OUTPATIENT
Start: 2024-07-24

## 2024-07-28 DIAGNOSIS — Z92.21 STATUS POST ADMINISTRATION OF CARDIOTOXIC CHEMOTHERAPY: ICD-10-CM

## 2024-07-28 DIAGNOSIS — R94.31 ABNORMAL ECG: ICD-10-CM

## 2024-07-29 RX ORDER — SPIRONOLACTONE 25 MG/1
25 TABLET ORAL DAILY
Qty: 90 TABLET | Refills: 0 | Status: SHIPPED | OUTPATIENT
Start: 2024-07-29 | End: 2024-10-27

## 2024-07-30 ENCOUNTER — LAB (OUTPATIENT)
Dept: LAB | Facility: LAB | Age: 49
End: 2024-07-30
Payer: COMMERCIAL

## 2024-07-30 ENCOUNTER — APPOINTMENT (OUTPATIENT)
Dept: NEUROLOGY | Facility: CLINIC | Age: 49
End: 2024-07-30
Payer: COMMERCIAL

## 2024-07-30 DIAGNOSIS — C83.30 DIFFUSE LARGE B-CELL LYMPHOMA, UNSPECIFIED BODY REGION (MULTI): ICD-10-CM

## 2024-07-30 DIAGNOSIS — G43.711 CHRONIC MIGRAINE WITHOUT AURA, INTRACTABLE, WITH STATUS MIGRAINOSUS: ICD-10-CM

## 2024-07-30 DIAGNOSIS — Z79.899 HIGH RISK MEDICATION USE: Primary | ICD-10-CM

## 2024-07-30 DIAGNOSIS — G43.909 MIGRAINE WITHOUT STATUS MIGRAINOSUS, NOT INTRACTABLE, UNSPECIFIED MIGRAINE TYPE: ICD-10-CM

## 2024-07-30 DIAGNOSIS — F98.8 OTHER SPECIFIED BEHAVIORAL AND EMOTIONAL DISORDERS WITH ONSET USUALLY OCCURRING IN CHILDHOOD AND ADOLESCENCE: ICD-10-CM

## 2024-07-30 DIAGNOSIS — Z79.899 HIGH RISK MEDICATION USE: ICD-10-CM

## 2024-07-30 PROCEDURE — 80324 DRUG SCREEN AMPHETAMINES 1/2: CPT

## 2024-07-30 PROCEDURE — 99214 OFFICE O/P EST MOD 30 MIN: CPT | Performed by: NURSE PRACTITIONER

## 2024-07-30 PROCEDURE — 36415 COLL VENOUS BLD VENIPUNCTURE: CPT

## 2024-07-30 PROCEDURE — 80307 DRUG TEST PRSMV CHEM ANLYZR: CPT

## 2024-07-30 PROCEDURE — 83540 ASSAY OF IRON: CPT

## 2024-07-30 PROCEDURE — 82728 ASSAY OF FERRITIN: CPT

## 2024-07-30 PROCEDURE — 83550 IRON BINDING TEST: CPT

## 2024-07-30 RX ORDER — LISDEXAMFETAMINE DIMESYLATE 20 MG/1
20 CAPSULE ORAL EVERY MORNING
Qty: 30 CAPSULE | Refills: 0 | Status: SHIPPED | OUTPATIENT
Start: 2024-07-30

## 2024-07-30 RX ORDER — GABAPENTIN 300 MG/1
600 CAPSULE ORAL 2 TIMES DAILY
Qty: 360 CAPSULE | Refills: 1 | Status: SHIPPED | OUTPATIENT
Start: 2024-07-30

## 2024-07-30 RX ORDER — LISDEXAMFETAMINE DIMESYLATE 20 MG/1
20 CAPSULE ORAL EVERY MORNING
Qty: 30 CAPSULE | Refills: 0 | Status: SHIPPED | OUTPATIENT
Start: 2024-09-28

## 2024-07-30 RX ORDER — ATOGEPANT 60 MG/1
60 TABLET ORAL DAILY
Qty: 90 TABLET | Refills: 1 | Status: SHIPPED | OUTPATIENT
Start: 2024-07-30

## 2024-07-30 RX ORDER — LISDEXAMFETAMINE DIMESYLATE 20 MG/1
20 CAPSULE ORAL EVERY MORNING
Qty: 30 CAPSULE | Refills: 0 | Status: SHIPPED | OUTPATIENT
Start: 2024-08-29

## 2024-07-30 NOTE — PROGRESS NOTES
Patient being assessed today for follow-up of migraine and ADHD with difficulty at home and work.  Patient reports medication is effective as evidenced by her ability to focus is easier when she takes medication.  When she does not take the medication.  Patient denies chest pain, heart palpitations, shortness of breath.  OARRS report reviewed.  Urine tox ordered.  As far as her migraines are concerned she has had some increased activity just due to poor sleep and weather changes.  She reports that still manageable.  Denies any side effects.  We will continue the Qulipta, gabapentin, Vyvanse that she has been taking them.  Discussed role of medicine, controlled substance policy, abuse potential, importance of taking medications, potential risks, benefits, and precautions to be taken.  Reviewed sleep hygiene and dietary modifications.  Follow-up in 6 months.    This note was created with voice recognition software and was not corrected for typographical or grammatical errors    OARRS:  No data recorded  I have personally reviewed the OARRS report for Sandra Meredith. I have considered the risks of abuse, dependence, addiction and diversion    Is the patient prescribed a combination of a benzodiazepine and opioid?  No    Last Urine Drug Screen / ordered today: Yes  Recent Results (from the past 8760 hour(s))   Drug Screen, Urine With Reflex to Confirmation    Collection Time: 09/14/23 11:39 AM   Result Value Ref Range    DRUG SCREEN COMMENT URINE SEE BELOW     Amphetamine Screen, Urine PRESUMPTIVE NEGATIVE NEGATIVE    Barbiturate Screen, Urine PRESUMPTIVE NEGATIVE NEGATIVE    BENZODIAZEPINE (PRESENCE) IN URINE BY SCREEN METHOD PRESUMPTIVE NEGATIVE NEGATIVE    Cannabinoid Screen, Urine PRESUMPTIVE NEGATIVE NEGATIVE    Cocaine Screen, Urine PRESUMPTIVE NEGATIVE NEGATIVE    Fentanyl, Ur PRESUMPTIVE NEGATIVE NEGATIVE    Opiate Screen, Urine PRESUMPTIVE NEGATIVE NEGATIVE    Oxycodone Screen, Ur PRESUMPTIVE NEGATIVE NEGATIVE     PCP Screen, Urine PRESUMPTIVE NEGATIVE NEGATIVE     Results are as expected.         Controlled Substance Agreement:  Date of the Last Agreement: 7/30/2024  Reviewed Controlled Substance Agreement including but not limited to the benefits, risks, and alternatives to treatment with a Controlled Substance medication(s).    Stimulants:   What is the patient's goal of therapy?  Management of symptoms  Is this being achieved with current treatment?  Yes    Activities of Daily Living:   Is your overall impression that this patient is benefiting (symptom reduction outweighs side effects) from stimulant therapy? Yes     1. Physical Functioning: Better  2. Family Relationship: Same  3. Social Relationship: Same  4. Mood: Same  5. Sleep Patterns: Same  6. Overall Function: Better

## 2024-07-31 ENCOUNTER — TELEPHONE (OUTPATIENT)
Dept: NEUROLOGY | Facility: CLINIC | Age: 49
End: 2024-07-31

## 2024-07-31 ENCOUNTER — APPOINTMENT (OUTPATIENT)
Dept: NEUROLOGY | Facility: CLINIC | Age: 49
End: 2024-07-31
Payer: COMMERCIAL

## 2024-07-31 LAB
AMPHETAMINES UR QL SCN: ABNORMAL
BARBITURATES UR QL SCN: ABNORMAL
BENZODIAZ UR QL SCN: ABNORMAL
BZE UR QL SCN: ABNORMAL
CANNABINOIDS UR QL SCN: ABNORMAL
FENTANYL+NORFENTANYL UR QL SCN: ABNORMAL
FERRITIN SERPL-MCNC: 116 NG/ML (ref 8–150)
IRON SATN MFR SERPL: 19 % (ref 25–45)
IRON SERPL-MCNC: 82 UG/DL (ref 35–150)
METHADONE UR QL SCN: ABNORMAL
OPIATES UR QL SCN: ABNORMAL
OXYCODONE+OXYMORPHONE UR QL SCN: ABNORMAL
PCP UR QL SCN: ABNORMAL
TIBC SERPL-MCNC: 436 UG/DL (ref 240–445)
UIBC SERPL-MCNC: 354 UG/DL (ref 110–370)

## 2024-07-31 NOTE — TELEPHONE ENCOUNTER
[employer] , Short term sulpply Qulipta 60 mg Qty 14 days on Short term.  Sent to   [id number] - [place], [place] - [address] [address] Phone: [phone]      Needed, Leasing 9 AM tomorrow

## 2024-08-02 LAB
AMPHET UR-MCNC: 3469 NG/ML
MDA UR-MCNC: <200 NG/ML
MDEA UR-MCNC: <200 NG/ML
MDMA UR-MCNC: <200 NG/ML
METHAMPHET UR-MCNC: <200 NG/ML
PHENTERMINE UR CFM-MCNC: <200 NG/ML

## 2024-08-06 ENCOUNTER — APPOINTMENT (OUTPATIENT)
Dept: NEUROLOGY | Facility: CLINIC | Age: 49
End: 2024-08-06
Payer: COMMERCIAL

## 2024-09-11 ENCOUNTER — TELEPHONE (OUTPATIENT)
Dept: NEUROLOGY | Facility: CLINIC | Age: 49
End: 2024-09-11
Payer: COMMERCIAL

## 2024-09-11 NOTE — TELEPHONE ENCOUNTER
Called regarding lisdexamfetamine (Vyvanse) 20 mg capsule Pharmacy states that it can't be filled until 9/28/24 She only has 8 Cap left.  She said that it was last filled on 8/8 24.  Pharmacy is   [id number] - [place], [place] - [address] [address] Phone: [phone]

## 2024-09-12 NOTE — TELEPHONE ENCOUNTER
She can take it every other day she just may have challenges with her ability to focus.  That would at least get her through until she is able to fill it

## 2024-09-12 NOTE — TELEPHONE ENCOUNTER
According to her OARRS report she did get it filled on 8/8/2024.  Although, with tracking it back they have been consistently filling it early for her so if they are telling her that she should have enough meds to last her, then she should because of this early filling.  That would mean that she would have pills left from the previous month when she is picking up the next prescription.  I have no control over the pharmacy not going to fill it.  Maybe if you could call her and explain that part to her, because the question is why does not she have enough pills when she is gotten her prescription early for several months in a row?

## 2024-09-23 DIAGNOSIS — F98.8 OTHER SPECIFIED BEHAVIORAL AND EMOTIONAL DISORDERS WITH ONSET USUALLY OCCURRING IN CHILDHOOD AND ADOLESCENCE: ICD-10-CM

## 2024-09-24 DIAGNOSIS — F98.8 OTHER SPECIFIED BEHAVIORAL AND EMOTIONAL DISORDERS WITH ONSET USUALLY OCCURRING IN CHILDHOOD AND ADOLESCENCE: ICD-10-CM

## 2024-09-24 RX ORDER — LISDEXAMFETAMINE DIMESYLATE 20 MG/1
20 CAPSULE ORAL EVERY MORNING
Qty: 30 CAPSULE | Refills: 0 | Status: SHIPPED | OUTPATIENT
Start: 2024-10-23

## 2024-09-24 RX ORDER — LISDEXAMFETAMINE DIMESYLATE 20 MG/1
20 CAPSULE ORAL EVERY MORNING
Qty: 30 CAPSULE | Refills: 0 | Status: SHIPPED | OUTPATIENT
Start: 2024-09-24

## 2024-09-24 RX ORDER — LISDEXAMFETAMINE DIMESYLATE 20 MG/1
20 CAPSULE ORAL DAILY
Qty: 30 CAPSULE | Refills: 0 | OUTPATIENT
Start: 2024-09-24

## 2024-09-24 NOTE — TELEPHONE ENCOUNTER
Zheng from [employer] drug Pricing Assistant called in addition to this vyvanse automated request from pharmacy.   Sandra is asking for Vyvanse fill at [facility] as the [facility] is out of stock of vyvanse.   [facility] has a script of vyvanse that is not due to fill until 9- and she needs a fill sooner from [facility].

## 2024-10-02 ENCOUNTER — TELEPHONE (OUTPATIENT)
Dept: ADMISSION | Facility: HOSPITAL | Age: 49
End: 2024-10-02
Payer: COMMERCIAL

## 2024-10-02 DIAGNOSIS — C83.30 DIFFUSE LARGE B-CELL LYMPHOMA, UNSPECIFIED BODY REGION (MULTI): ICD-10-CM

## 2024-10-02 NOTE — TELEPHONE ENCOUNTER
Pt had last FUV on 6/12. Last PET scan on 5/2.   Orders are in for CT scan in Dec- pt checking if she needs to have CT of chest/abd/pelvis since she had PET scan in May.   Pt also advising that she complete script of eliquis a few weeks ago- has been without and feels she is doing well. She does not want to restart if team feels eliquis is not needed at this time.

## 2024-10-02 NOTE — TELEPHONE ENCOUNTER
Per ZOHAIB Reich RN with Dr Maier:      will send over a new script for her to keep with the eliquis until Dec FUV .. She is seeing us on dec 11th and the order for the CT scan is in.. she will need a CT scan since she had the PET scan prior to out last visit.. She will need the scan completed prior to seeing us as it is a surveillance scan.     Detailed message left on identified VM with above information and contact info if any further questions or concerns.

## 2024-10-21 ENCOUNTER — TELEPHONE (OUTPATIENT)
Dept: NEUROLOGY | Facility: CLINIC | Age: 49
End: 2024-10-21
Payer: COMMERCIAL

## 2024-10-21 NOTE — TELEPHONE ENCOUNTER
Called to refill lisdexamfetamine (Vyvanse) 20 mg capsule.  Pharmacy is   [id number] - [place], [place] - [address] Phone: [phone]

## 2024-10-29 DIAGNOSIS — R94.31 ABNORMAL ECG: ICD-10-CM

## 2024-10-29 DIAGNOSIS — Z92.21 STATUS POST ADMINISTRATION OF CARDIOTOXIC CHEMOTHERAPY: ICD-10-CM

## 2024-10-30 RX ORDER — SPIRONOLACTONE 25 MG/1
25 TABLET ORAL DAILY
Qty: 90 TABLET | Refills: 1 | Status: SHIPPED | OUTPATIENT
Start: 2024-10-30 | End: 2025-04-28

## 2024-11-04 DIAGNOSIS — C83.30 DIFFUSE LARGE B-CELL LYMPHOMA, UNSPECIFIED BODY REGION (MULTI): ICD-10-CM

## 2024-11-18 DIAGNOSIS — C83.30 DIFFUSE LARGE B-CELL LYMPHOMA, UNSPECIFIED BODY REGION (MULTI): ICD-10-CM

## 2024-11-18 RX ORDER — APIXABAN 2.5 MG/1
2.5 TABLET, FILM COATED ORAL 2 TIMES DAILY
Qty: 28 TABLET | Refills: 1 | OUTPATIENT
Start: 2024-11-18

## 2024-11-22 DIAGNOSIS — F98.8 OTHER SPECIFIED BEHAVIORAL AND EMOTIONAL DISORDERS WITH ONSET USUALLY OCCURRING IN CHILDHOOD AND ADOLESCENCE: ICD-10-CM

## 2024-11-22 RX ORDER — LISDEXAMFETAMINE DIMESYLATE 20 MG/1
20 CAPSULE ORAL EVERY MORNING
Qty: 30 CAPSULE | Refills: 0 | Status: SHIPPED | OUTPATIENT
Start: 2025-01-20

## 2024-11-22 RX ORDER — LISDEXAMFETAMINE DIMESYLATE 20 MG/1
CAPSULE ORAL
Qty: 30 CAPSULE | Refills: 0 | Status: SHIPPED | OUTPATIENT
Start: 2024-11-22

## 2024-11-22 RX ORDER — LISDEXAMFETAMINE DIMESYLATE 20 MG/1
20 CAPSULE ORAL EVERY MORNING
Qty: 30 CAPSULE | Refills: 0 | Status: SHIPPED | OUTPATIENT
Start: 2024-12-21

## 2024-11-22 NOTE — TELEPHONE ENCOUNTER
Patient called to refill her Vyanse 20mg daily.     Last visit 7/30/24  Next visit 1/28/25  UDS &CSA 7/30/24

## 2024-12-02 ENCOUNTER — HOSPITAL ENCOUNTER (OUTPATIENT)
Dept: RADIOLOGY | Facility: HOSPITAL | Age: 49
Discharge: HOME | End: 2024-12-02
Payer: COMMERCIAL

## 2024-12-02 DIAGNOSIS — C83.30 DIFFUSE LARGE B-CELL LYMPHOMA, UNSPECIFIED BODY REGION (MULTI): ICD-10-CM

## 2024-12-02 LAB
ALBUMIN SERPL BCP-MCNC: 4.1 G/DL (ref 3.4–5)
ALP SERPL-CCNC: 82 U/L (ref 33–110)
ALT SERPL W P-5'-P-CCNC: 19 U/L (ref 7–45)
ANION GAP SERPL CALC-SCNC: 12 MMOL/L (ref 10–20)
AST SERPL W P-5'-P-CCNC: 16 U/L (ref 9–39)
BASOPHILS # BLD AUTO: 0.03 X10*3/UL (ref 0–0.1)
BASOPHILS NFR BLD AUTO: 0.5 %
BILIRUB SERPL-MCNC: 0.5 MG/DL (ref 0–1.2)
BUN SERPL-MCNC: 15 MG/DL (ref 6–23)
CALCIUM SERPL-MCNC: 9 MG/DL (ref 8.6–10.3)
CHLORIDE SERPL-SCNC: 106 MMOL/L (ref 98–107)
CO2 SERPL-SCNC: 26 MMOL/L (ref 21–32)
CREAT SERPL-MCNC: 0.75 MG/DL (ref 0.5–1.05)
CREAT SERPL-MCNC: 0.8 MG/DL (ref 0.6–1.3)
CRP SERPL-MCNC: 1 MG/DL
EGFRCR SERPLBLD CKD-EPI 2021: >90 ML/MIN/1.73M*2
EOSINOPHIL # BLD AUTO: 0.06 X10*3/UL (ref 0–0.7)
EOSINOPHIL NFR BLD AUTO: 0.9 %
ERYTHROCYTE [DISTWIDTH] IN BLOOD BY AUTOMATED COUNT: 13.5 % (ref 11.5–14.5)
ERYTHROCYTE [SEDIMENTATION RATE] IN BLOOD BY WESTERGREN METHOD: 25 MM/H (ref 0–20)
GFR SERPL CREATININE-BSD FRML MDRD: >90 ML/MIN/1.73M*2
GLUCOSE SERPL-MCNC: 118 MG/DL (ref 74–99)
HCT VFR BLD AUTO: 40 % (ref 36–46)
HGB BLD-MCNC: 13.3 G/DL (ref 12–16)
IMM GRANULOCYTES # BLD AUTO: 0.01 X10*3/UL (ref 0–0.7)
IMM GRANULOCYTES NFR BLD AUTO: 0.2 % (ref 0–0.9)
LDH SERPL L TO P-CCNC: 187 U/L (ref 84–246)
LYMPHOCYTES # BLD AUTO: 1.51 X10*3/UL (ref 1.2–4.8)
LYMPHOCYTES NFR BLD AUTO: 22.7 %
MAGNESIUM SERPL-MCNC: 1.91 MG/DL (ref 1.6–2.4)
MCH RBC QN AUTO: 27.3 PG (ref 26–34)
MCHC RBC AUTO-ENTMCNC: 33.3 G/DL (ref 32–36)
MCV RBC AUTO: 82 FL (ref 80–100)
MONOCYTES # BLD AUTO: 0.5 X10*3/UL (ref 0.1–1)
MONOCYTES NFR BLD AUTO: 7.5 %
NEUTROPHILS # BLD AUTO: 4.55 X10*3/UL (ref 1.2–7.7)
NEUTROPHILS NFR BLD AUTO: 68.2 %
NRBC BLD-RTO: 0 /100 WBCS (ref 0–0)
PLATELET # BLD AUTO: 282 X10*3/UL (ref 150–450)
POTASSIUM SERPL-SCNC: 3.9 MMOL/L (ref 3.5–5.3)
PROT SERPL-MCNC: 7 G/DL (ref 6.4–8.2)
RBC # BLD AUTO: 4.88 X10*6/UL (ref 4–5.2)
SODIUM SERPL-SCNC: 140 MMOL/L (ref 136–145)
WBC # BLD AUTO: 6.7 X10*3/UL (ref 4.4–11.3)

## 2024-12-02 PROCEDURE — 85025 COMPLETE CBC W/AUTO DIFF WBC: CPT | Performed by: INTERNAL MEDICINE

## 2024-12-02 PROCEDURE — 74177 CT ABD & PELVIS W/CONTRAST: CPT | Performed by: RADIOLOGY

## 2024-12-02 PROCEDURE — 36415 COLL VENOUS BLD VENIPUNCTURE: CPT | Performed by: INTERNAL MEDICINE

## 2024-12-02 PROCEDURE — 86140 C-REACTIVE PROTEIN: CPT | Performed by: INTERNAL MEDICINE

## 2024-12-02 PROCEDURE — 85652 RBC SED RATE AUTOMATED: CPT | Performed by: INTERNAL MEDICINE

## 2024-12-02 PROCEDURE — 74177 CT ABD & PELVIS W/CONTRAST: CPT

## 2024-12-02 PROCEDURE — 71260 CT THORAX DX C+: CPT | Performed by: RADIOLOGY

## 2024-12-02 PROCEDURE — 83735 ASSAY OF MAGNESIUM: CPT | Performed by: INTERNAL MEDICINE

## 2024-12-02 PROCEDURE — 2550000001 HC RX 255 CONTRASTS: Performed by: INTERNAL MEDICINE

## 2024-12-02 PROCEDURE — 83615 LACTATE (LD) (LDH) ENZYME: CPT | Performed by: INTERNAL MEDICINE

## 2024-12-02 PROCEDURE — 84075 ASSAY ALKALINE PHOSPHATASE: CPT | Performed by: INTERNAL MEDICINE

## 2024-12-02 PROCEDURE — 82565 ASSAY OF CREATININE: CPT

## 2024-12-06 ENCOUNTER — OFFICE VISIT (OUTPATIENT)
Dept: FAMILY MEDICINE CLINIC | Age: 49
End: 2024-12-06
Payer: COMMERCIAL

## 2024-12-06 VITALS
SYSTOLIC BLOOD PRESSURE: 110 MMHG | OXYGEN SATURATION: 97 % | HEART RATE: 66 BPM | DIASTOLIC BLOOD PRESSURE: 80 MMHG | WEIGHT: 237 LBS | TEMPERATURE: 97.1 F | BODY MASS INDEX: 38.09 KG/M2 | HEIGHT: 66 IN

## 2024-12-06 DIAGNOSIS — F41.9 ANXIETY AND DEPRESSION: ICD-10-CM

## 2024-12-06 DIAGNOSIS — E66.01 CLASS 3 SEVERE OBESITY WITH SERIOUS COMORBIDITY IN ADULT, UNSPECIFIED BMI, UNSPECIFIED OBESITY TYPE: ICD-10-CM

## 2024-12-06 DIAGNOSIS — F32.A ANXIETY AND DEPRESSION: ICD-10-CM

## 2024-12-06 DIAGNOSIS — E11.9 TYPE 2 DIABETES MELLITUS WITHOUT COMPLICATION, UNSPECIFIED WHETHER LONG TERM INSULIN USE (HCC): Primary | ICD-10-CM

## 2024-12-06 DIAGNOSIS — G89.29 CHRONIC DENTAL PAIN: ICD-10-CM

## 2024-12-06 DIAGNOSIS — E11.9 TYPE 2 DIABETES MELLITUS WITHOUT COMPLICATION, UNSPECIFIED WHETHER LONG TERM INSULIN USE (HCC): ICD-10-CM

## 2024-12-06 DIAGNOSIS — C85.10 B-CELL LYMPHOMA, UNSPECIFIED B-CELL LYMPHOMA TYPE, UNSPECIFIED BODY REGION (HCC): ICD-10-CM

## 2024-12-06 DIAGNOSIS — Z12.31 ENCOUNTER FOR SCREENING MAMMOGRAM FOR MALIGNANT NEOPLASM OF BREAST: ICD-10-CM

## 2024-12-06 DIAGNOSIS — E66.813 CLASS 3 SEVERE OBESITY WITH SERIOUS COMORBIDITY IN ADULT, UNSPECIFIED BMI, UNSPECIFIED OBESITY TYPE: ICD-10-CM

## 2024-12-06 DIAGNOSIS — K08.9 CHRONIC DENTAL PAIN: ICD-10-CM

## 2024-12-06 DIAGNOSIS — Z13.220 LIPID SCREENING: ICD-10-CM

## 2024-12-06 LAB
CHOLEST SERPL-MCNC: 359 MG/DL (ref 0–199)
HDLC SERPL-MCNC: 51 MG/DL (ref 40–59)
LDL CHOLESTEROL: 255 MG/DL (ref 0–129)
TRIGLYCERIDE, FASTING: 264 MG/DL (ref 0–150)
TSH REFLEX: 1.03 UIU/ML (ref 0.44–3.86)

## 2024-12-06 PROCEDURE — 99214 OFFICE O/P EST MOD 30 MIN: CPT | Performed by: FAMILY MEDICINE

## 2024-12-06 RX ORDER — SERTRALINE HYDROCHLORIDE 25 MG/1
37.5 TABLET, FILM COATED ORAL DAILY
Qty: 135 TABLET | Refills: 1 | Status: SHIPPED | OUTPATIENT
Start: 2024-12-06

## 2024-12-07 LAB
ESTIMATED AVERAGE GLUCOSE: 140 MG/DL
HBA1C MFR BLD: 6.5 % (ref 4–6)

## 2024-12-09 DIAGNOSIS — G89.29 CHRONIC DENTAL PAIN: Primary | ICD-10-CM

## 2024-12-09 DIAGNOSIS — K08.9 CHRONIC DENTAL PAIN: Primary | ICD-10-CM

## 2024-12-09 RX ORDER — OXYCODONE HYDROCHLORIDE 5 MG/1
5 TABLET ORAL EVERY 8 HOURS PRN
Qty: 9 TABLET | Refills: 0 | Status: SHIPPED | OUTPATIENT
Start: 2024-12-09 | End: 2024-12-12

## 2024-12-10 DIAGNOSIS — E78.5 HYPERLIPIDEMIA, UNSPECIFIED HYPERLIPIDEMIA TYPE: Primary | ICD-10-CM

## 2024-12-10 RX ORDER — ATORVASTATIN CALCIUM 20 MG/1
20 TABLET, FILM COATED ORAL NIGHTLY
Qty: 90 TABLET | Refills: 1 | Status: SHIPPED | OUTPATIENT
Start: 2024-12-10

## 2024-12-11 ENCOUNTER — OFFICE VISIT (OUTPATIENT)
Dept: HEMATOLOGY/ONCOLOGY | Facility: HOSPITAL | Age: 49
End: 2024-12-11
Payer: COMMERCIAL

## 2024-12-11 VITALS
DIASTOLIC BLOOD PRESSURE: 81 MMHG | BODY MASS INDEX: 37.46 KG/M2 | SYSTOLIC BLOOD PRESSURE: 117 MMHG | RESPIRATION RATE: 16 BRPM | OXYGEN SATURATION: 100 % | WEIGHT: 232.1 LBS | HEART RATE: 72 BPM | TEMPERATURE: 97.2 F

## 2024-12-11 DIAGNOSIS — C83.30 DIFFUSE LARGE B-CELL LYMPHOMA, UNSPECIFIED BODY REGION (MULTI): Primary | ICD-10-CM

## 2024-12-11 PROCEDURE — 99214 OFFICE O/P EST MOD 30 MIN: CPT

## 2024-12-11 ASSESSMENT — PAIN SCALES - GENERAL: PAINLEVEL_OUTOF10: 4

## 2024-12-11 NOTE — PROGRESS NOTES
"    Patient ID: Sandra Meredith is a 49 y.o. female.  Referring Physician: Connie Maier MD  [id number] [facility]  [facility] of Medicine-H[place]ology [place] [address]  Primary Care Provider: Jose Lambert MD    Date of Service:  12/11/2024    Oncologic history:  Lymphoma, diagnosed in August 2022 after a fall and a pathological right humeral fracture at the end of July. Reports right arm \"deep bone pain\" for several months  prior. S/p right arm open reduction internal fixation in August.   13 x 3 x 4 cm mass involving the mid and proximal humeral diaphysis   Not double expresser, no MYC translocation, final specification diffuse large B-cell lymphoma NOS.   Ki-67 80%  r-IPI score of 2, considered favorable, with 80% projected 4 years BFS.   As primary bone lymphoma, stage, and age appears most prognostic.     PET scan 11 August 2022 showed uptake in the right humerus, left posterior pleura, left iliac bone and left external iliac lymph nodes.      Received first cycle R-CHOP on 2 Sept 2022.  Right arm radiation September 12 to September 23, 2022 in 10 fractions.  We do not recommend further radiation to any other sites of disease  Cycle 2 R-CHOP given on September 23, 2022  -Restaging PET scan denied by insurance.  Planning CT chest abdomen pelvis after cycle 3 of chemotherapy  -Cycle 3 R-CHOP on 10/14/2022  -Cycle 4 R-CHOP given 11/4/2022.  Tolerating well, asymptomatic.  Left chest wall pain resolved.  CT scan of the -Chest abdomen pelvis November 8, 2022 showed sclerotic bone changes but could not elucidate the bone disease accurately, L4 lytic area under  observation, no intra-abdominal disease, no colitis colitis, check radiculitis.     -Evaluation 11/23/2022 tolerating well, epigastric discomfort intermittent chronic and stable.  On PPI.  Occasional loose stools sending C. difficile and enteric pathogens.  CT showed abundance of stools in the colon suggest Metamucil to regulate her  " bowel movement.  She is taking probiotics as well.     -Cycle 5 R-CHOP November 25, 2022  -Cycle 6 R-CHOP 12/16/2022  -End of therapy PET scan shows a CR except for a Dou[place] 4 residual small focal activity in the left gluteus muscle at site of prior lymphoma mass.  -Discussion at tumor board January 2023, plan to attempt at biopsy of residual activity in the left gluteus muscle area under CT or ultrasound guidance  -Evaluation January 18, 2023: Increased dyspnea on minimal exertion, CTA negative for PE, concerned about filling defect around the catheter tip, not mentioned in the report, could be artifact and flow effect, recommended that patient go to the emergency  room for venous Doppler of the bilateral IJ and subclavian veins.  -Admitted mid-February 2023 with progressive SVC syndrome, on anticoagulation, enoxaparin, to keep for now additional 2 to 3 weeks and then transition to apixaban.  Mild elevation in anticardiolipin and beta-2 glycoprotein 1 antibody, repeat.  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  Planning radiation therapy, starting 3/2/2023 in [place].  -Plan evaluation for possible CAR-T therapy.  Other options include observation or rituximab maintenance as we would normally do in treating [place] patients after 6 cycles of R-CHOP.  -Radiation therapy to residual activity left pelvic/gluteus area completed March 22, 2023, 30 Marroquin delivered.  -Planned for SVC thrombectomy by interventional radiology the week of March 27, 2023.  -Evaluation March 22, 2023, doing well, rash secondary to enoxaparin, switch to apixaban, plan to evaluate mid May 2023 with PET scan.  Patient is about 3 months out from the end of her chemoimmunotherapy.  -Plan to be evaluated April 2023 by Dr. Grover for consideration of CAR-T consolidation.  -PET scan April 14 2023 no residual lymphoma.   -Met with  Dr. Grover, decided against CAR-T and to watch expectantly at this time.  -CT scan chest abdomen pelvis on August 16, 2023 no evidence of disease  -Evaluation 8/26/2023: Asymptomatic no evidence of disease clinically radiographically or by labs.  Plan to see back in 4 months with CBC CMP LDH CT chest abdomen pelvis and ESR.  Plan to talk over the phone in 1-3 to 4 weeks after a D-dimer and echocardiogram  and to discuss option of reducing apixaban to 2.5 mg p.o. twice daily to finish a year of anticoagulation before final discontinuation.   -Phone visit 9/20/2023, reviewed D-dimer, normal, instructed to reduce apixaban to 2.5 mg p.o. twice daily for another 6 months through March 2024.  -Evaluation December 20, 2023: 1 year since end of therapy in December 2023, doing well, recent imaging December 13, 2023 shows no evidence of disease stable sclerotic bone changes.  -Evaluation 6/13/2024, 1 and half years out from end of therapy in December 2023, asymptomatic.  PET scan on May 2, 2024 showed no evidence of disease.  Episode of lightheadedness February 2024, no PE by CTA, continued on apixaban.  -Evaluation 12/11/2024: 2 year from the end of therapy. CT from 12/2/2024 shows TAYLOR, stable sclerotic bone changes. C/o left hip pain, ordered x-ray. CT and follow up in 6 months. 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 twice daily September 2023, plan to discontinue and switch to 162 mg of aspirin March 2024.  Iron deficiency, no documented bleeding, following with GI.  To discuss with patient GI follow-up    SUBJECTIVE:  Sandra Meredith presents today for follow up. Feeling pretty good. Does state left hip hurts but recently been doing a lot of squatting at work as her work is physical. Started two weeks ago, feels discomfort all the time. Worse with movement. Nervous because it feels like it does when  diagnosed. Recently had dental procedure and was on abx.       Review of Systems   Constitutional: Negative.  Negative for appetite change, chills, diaphoresis, fatigue, fever and unexpected weight change.   HENT:  Negative.  Negative for lump/mass.    Eyes: Negative.    Respiratory: Negative.  Negative for chest tightness, cough and shortness of breath.    Cardiovascular: Negative.  Negative for chest pain, leg swelling and palpitations.   Gastrointestinal: Negative.  Negative for blood in stool, constipation, diarrhea, nausea and vomiting.   Genitourinary: Negative.     Musculoskeletal:  Positive for arthralgias (Left hip).   Skin: Negative.    Neurological: Negative.  Negative for dizziness, headaches, light-headedness and numbness.   Hematological: Negative.  Negative for adenopathy.   Psychiatric/Behavioral: Negative.          OBJECTIVE:  KPS: Karnofsky Score: 100 - Fully active, able to carry on all pre-disease performed without restriction   VS:  There were no vitals taken for this visit.  BSA: There is no height or weight on file to calculate BSA.    Physical Exam  Vitals reviewed.   Constitutional:       Appearance: Normal appearance.   HENT:      Head: Normocephalic and atraumatic.      Mouth/Throat:      Mouth: Mucous membranes are moist.      Pharynx: Oropharynx is clear.   Eyes:      Conjunctiva/sclera: Conjunctivae normal.      Pupils: Pupils are equal, round, and reactive to light.   Cardiovascular:      Rate and Rhythm: Normal rate and regular rhythm.      Pulses: Normal pulses.      Heart sounds: Normal heart sounds.   Pulmonary:      Effort: Pulmonary effort is normal.      Breath sounds: Normal breath sounds.   Abdominal:      General: Bowel sounds are normal.      Palpations: Abdomen is soft.   Musculoskeletal:         General: Normal range of motion.      Cervical back: Normal range of motion and neck supple.      Right lower leg: No edema.      Left lower leg: No edema.   Lymphadenopathy:       "Cervical: No cervical adenopathy.   Skin:     General: Skin is warm and dry.      Capillary Refill: Capillary refill takes less than 2 seconds.   Neurological:      General: No focal deficit present.      Mental Status: She is alert and oriented to person, place, and time.   Psychiatric:         Mood and Affect: Mood normal.         Behavior: Behavior normal.         Thought Content: Thought content normal.         Judgment: Judgment normal.       Assessment & Plan  Diffuse large B-cell lymphoma, unspecified body region (Multi)  Large B-cell lymphoma, \"primary bone lymphoma\".  Risk stratification: r-IPI 2, favorable, projected 80% 4-year PFS.  Therapy: Completed R-CHOP chemoimmunotherapy December 2022, patient is 2 years out from end of therapy.  Comorbidities:   -SVC syndrome, plans to discontinue in March 2024 withheld due to an episode of lightheadedness \"similar to when she had SVC syndrome\".  Continues low-dose apixaban maintenance 2.5 mg p.o. twice daily.               Disease status: No evidence of disease, goals of care is curative intent therapy, completed December 2022  Reviewed CT scan done 12/2/24 with patient  Plan: Follow-up in 6 months with CT chest abdomen pelvis.            Left hip x ray ordered due to pain. Not likely disease progression since CT was just done.       Current Outpatient Medications   Medication Instructions    acetaminophen (Tylenol) 500 mg tablet oral    apixaban (ELIQUIS) 2.5 mg, oral, 2 times daily    ascorbic acid (Vitamin C) 500 mg tablet oral    calcium carbonate 500 mg calcium (1,250 mg) chewable tablet 1 tablet, oral, Daily    cholecalciferol (Vitamin D-3) 50 MCG (2000 UT) tablet 1 tablet, oral, Daily    cyclobenzaprine (Flexeril) 10 mg tablet oral    folic acid (FOLVITE) 1 mg, oral, Daily    gabapentin (NEURONTIN) 600 mg, oral, 2 times daily    garlic 500 mg capsule oral    lisdexamfetamine (Vyvanse) 20 mg capsule Take 1 capsule (20 mg) by mouth once daily in the morning "    [START ON 12/21/2024] lisdexamfetamine (VYVANSE) 20 mg, oral, Every morning    [START ON 1/20/2025] lisdexamfetamine (VYVANSE) 20 mg, oral, Every morning    magnesium, amino acid chelate, 133 mg tablet 1 tablet, oral, 2 times daily    metoprolol succinate XL (TOPROL-XL) 100 mg, oral, 2 times daily    omega-3-dha-epa-dpa-fish oil (Omega MonoPure) 860-260-1,300 mg capsule,delayed release(DR/EC) oral    pantoprazole (PROTONIX) 40 mg, oral, Daily    potassium gluconate 595 mg (99 mg) tablet 1 tablet, oral, Daily    Qulipta 60 mg, oral, Daily    sertraline (ZOLOFT) 37.5 mg, oral, Daily    spironolactone (ALDACTONE) 25 mg, oral, Daily    thiamine (VITAMIN B-1) 100 mg, oral, Daily        Laboratory:  The pertinent laboratory results were reviewed and discussed with the patient.    Lab Results   Component Value Date    WBC 6.7 12/02/2024    HCT 40.0 12/02/2024    HGB 13.3 12/02/2024     12/02/2024    K 3.9 12/02/2024    CALCIUM 9.0 12/02/2024     12/02/2024    MG 1.91 12/02/2024    BILITOT 0.5 12/02/2024    ALT 19 12/02/2024    AST 16 12/02/2024    BUN 15 12/02/2024    CREATININE 0.75 12/02/2024    PHOS CANCELED 02/27/2023      Note: for a comprehensive list of the patient's lab results, access the Results Review activity.    RTC: 6 months with CT scan    Lena Romero, APRN-CNP

## 2024-12-12 ASSESSMENT — ENCOUNTER SYMPTOMS
DIAPHORESIS: 0
CARDIOVASCULAR NEGATIVE: 1
NAUSEA: 0
CONSTITUTIONAL NEGATIVE: 1
UNEXPECTED WEIGHT CHANGE: 0
DIARRHEA: 0
FEVER: 0
SHORTNESS OF BREATH: 0
PALPITATIONS: 0
VOMITING: 0
ADENOPATHY: 0
HEADACHES: 0
NEUROLOGICAL NEGATIVE: 1
APPETITE CHANGE: 0
CHEST TIGHTNESS: 0
FATIGUE: 0
RESPIRATORY NEGATIVE: 1
NUMBNESS: 0
CHILLS: 0
BLOOD IN STOOL: 0
DIZZINESS: 0
CONSTIPATION: 0
ARTHRALGIAS: 1
LIGHT-HEADEDNESS: 0
COUGH: 0
EYES NEGATIVE: 1
PSYCHIATRIC NEGATIVE: 1
LEG SWELLING: 0
GASTROINTESTINAL NEGATIVE: 1
HEMATOLOGIC/LYMPHATIC NEGATIVE: 1

## 2024-12-13 NOTE — ASSESSMENT & PLAN NOTE
"Large B-cell lymphoma, \"primary bone lymphoma\".  Risk stratification: r-IPI 2, favorable, projected 80% 4-year PFS.  Therapy: Completed R-CHOP chemoimmunotherapy December 2022, patient is 2 years out from end of therapy.  Comorbidities:   -SVC syndrome, plans to discontinue in March 2024 withheld due to an episode of lightheadedness \"similar to when she had SVC syndrome\".  Continues low-dose apixaban maintenance 2.5 mg p.o. twice daily.               Disease status: No evidence of disease, goals of care is curative intent therapy, completed December 2022  Reviewed CT scan done 12/2/24 with patient  Plan: Follow-up in 6 months with CT chest abdomen pelvis.            Left hip x ray ordered due to pain. Not likely disease progression since CT was just done.     "

## 2024-12-16 ENCOUNTER — TELEPHONE (OUTPATIENT)
Dept: RADIATION ONCOLOGY | Facility: HOSPITAL | Age: 49
End: 2024-12-16
Payer: COMMERCIAL

## 2024-12-16 NOTE — TELEPHONE ENCOUNTER
12/17/2024 at 3:30 Pt CONFIRMED and will be present.    Pt is aware that the appointment is a phone/virtual visit, pt. understands that he/she doesn't have to show up in office.

## 2024-12-17 ENCOUNTER — HOSPITAL ENCOUNTER (OUTPATIENT)
Dept: RADIATION ONCOLOGY | Facility: HOSPITAL | Age: 49
Setting detail: RADIATION/ONCOLOGY SERIES
Discharge: HOME | End: 2024-12-17
Payer: COMMERCIAL

## 2024-12-17 DIAGNOSIS — C83.30 DIFFUSE LARGE B-CELL LYMPHOMA, UNSPECIFIED BODY REGION (MULTI): Primary | ICD-10-CM

## 2024-12-17 PROCEDURE — 99213 OFFICE O/P EST LOW 20 MIN: CPT | Mod: 95 | Performed by: NURSE PRACTITIONER

## 2024-12-17 PROCEDURE — 99213 OFFICE O/P EST LOW 20 MIN: CPT | Performed by: NURSE PRACTITIONER

## 2024-12-17 ASSESSMENT — ENCOUNTER SYMPTOMS
HEMATOLOGIC/LYMPHATIC NEGATIVE: 1
FATIGUE: 0
CARDIOVASCULAR NEGATIVE: 1
CHILLS: 0
UNEXPECTED WEIGHT CHANGE: 0
ARTHRALGIAS: 1
ACTIVITY CHANGE: 0
DIAPHORESIS: 0
NEUROLOGICAL NEGATIVE: 1
APPETITE CHANGE: 0
FEVER: 0
RESPIRATORY NEGATIVE: 1
GASTROINTESTINAL NEGATIVE: 1
PSYCHIATRIC NEGATIVE: 1

## 2024-12-17 NOTE — PROGRESS NOTES
"  Patient ID: [id number]   Lymphoma, diagnosed in August 2022 after a fall and a pathological right humeral fracture at the end of July. Reports right arm \"deep bone pain\" for several months  prior. S/p right arm open reduction internal fixation in August.   13 x 3 x 4 cm mass involving the mid and proximal humeral diaphysis   Not double expresser, no MYC translocation, final specification diffuse large B-cell lymphoma NOS.   Ki-67 80%  r-IPI score of 2, considered favorable, with 80% projected 4 years BFS.   As primary bone lymphoma, stage, and age appears most prognostic.     PET scan 11 August 2022 showed uptake in the right humerus, left posterior pleura, left iliac bone and left external iliac lymph nodes.      Received first cycle R-CHOP on 2 Sept 2022.  Right arm radiation September 12 to September 23, 2022 in 10 fractions.    Developed painful disease in the left lateral chest region. Went ahead and treated this site palliatively.   Treatment Area #1  Location : l lateral chest  Dates : 10/3/2022 to 10/17/2022  Number of Treatments : 10  Dose : 3000  Modality : photons    Treated left gluteal region for indolent lymphoma   Treatment Area #1  Location : L gluteal  Dates : 3/2-3/22/2023  Number of Treatments : 15  Dose : 3000cGy  Modality : photons    History of presenting illness    Telehealth visit with Sandra Meredith today. is a 49 y.o. female who presents today for follow up 1 years and 9 months s/p RT to the L gluteal for indolent lymphoma. She was treated in 2022 to the right arm and left lateral chest. Previously followed by Dr. Porter. Patient states she is doing well. Energy is baseline. She was started on iron which helped energy improve. 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  Appetite is good. She is intentionally losing some weight. Denies fevers and chills. Denies chest pain. Has been having some hot flashes but feels it is more post " menopausal.  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 She was started on Apixaban. Continues on low dose. She does endorses some soreness in left hip. CT negative. Dr. Rosa ordered an Xray she plans on getting soon. States she had a massage last week that really helped. Is scheduled for another this week. Denies new sites of pain. Patient had follow up with oncology on 12/11/24.     Review of systems:  Review of Systems   Constitutional:  Negative for activity change, appetite change, chills, diaphoresis, fatigue, fever and unexpected weight change.   HENT: Negative.     Respiratory: Negative.     Cardiovascular: Negative.    Gastrointestinal: Negative.    Genitourinary: Negative.    Musculoskeletal:  Positive for arthralgias.        Left hip   Skin: Negative.    Neurological: Negative.    Hematological: Negative.    Psychiatric/Behavioral: Negative.         Past Medical history  She  has a past surgical history that includes Other surgical history (08/12/2022); Other surgical history (08/12/2022); and US guided biopsy lymph node superficial (1/30/2023).      Radiology  CT chest abdomen pelvis w IV contrast 12/02/2024    Narrative  Interpreted By:  Simran Banda,  STUDY:  CT CHEST ABDOMEN PELVIS W IV CONTRAST;  12/2/2024 10:55 am    INDICATION:  Signs/Symptoms:re-stahe Lymphoma.    COMPARISON:  12/13/2023    ACCESSION NUMBER(S):  [id number]    ORDERING CLINICIAN:  ABENA ROSA    TECHNIQUE:  CT of the chest, abdomen, and pelvis was performed.  Contiguous axial images were obtained at 3 mm slice thickness through  the chest, abdomen and pelvis. Coronal and sagittal reconstructions  at 3 mm slice thickness were performed.  75 mL Omnipaque 350    FINDINGS:  CHEST:    LUNG/PLEURA/LARGE  AIRWAYS:  There is no infiltrate or pleural fluid.  There is no pulmonary  nodule.    VESSELS:  The thoracic vessels are unremarkable.    HEART:  The heart is unremarkable.    MEDIASTINUM AND BRIAN:  There is a 3.6 x 3.2 cm left infrahilar mass with calcification.  On 12/13/2023, this measured 3.9 x 3.3 cm.  On 08/16/2023, this measured 4.0 x 3.3 cm.  On 03/13/2023, this measured 3.7 x 2.7 cm.    CHEST WALL AND LOWER NECK:  There is no abnormality of the lower neck.  There is a stable lucent lesion involving T4 compared to 12/13/2023.  There is partial visualization of hardware in the right humeral head  and proximal humeral diaphysis. This was the site of primary  lymphomatous involvement.    ABDOMEN:    LIVER:  There is no hepatic mass.    BILE DUCTS:  There is no intrahepatic, common hepatic or common bile ductal  dilatation.    GALLBLADDER:  The gallbladder is unremarkable.    PANCREAS:  There is no abnormality of the pancreas.    SPLEEN:  The spleen is unremarkable. There is no splenic mass. There is no  splenomegaly    ADRENAL GLANDS:  The adrenal glands are unremarkable.    KIDNEYS AND URETERS:.  The kidneys function symmetrically.  There is no renal mass.  There is no intrarenal calculus or hydronephrosis.      PELVIS:    BLADDER:  The bladder is unremarkable.    REPRODUCTIVE ORGANS:  There is no abnormality of the reproductive organs.    BOWEL:  There is no bowel wall thickening, dilatation or obstruction.    VESSELS:  The abdominal and pelvic vessels are unremarkable.    PERITONEUM/RETROPERITONEUM/LYMPH NODES:  There is no retroperitoneal or pelvic adenopathy.  There is no  ascites.    ABDOMINAL WALL:  There is a nonindurated fat containing supraumbilical hernia with an  area of opening measuring 2.6 cm in transverse dimension.    BONES AND SOFT TISSUES:  There is a stable mixed sclerotic lytic lesion involving the left  iliac wing with no bony destruction.    There is no soft tissue  abnormality.    Impression  CHEST:  1.  Restage lymphoma.  2. Decrease in size of a left infrahilar mass with calcification  consistent with treated lymphoma.  3. Hardware in the right humeral head and proximal humeral diaphysis.  This was the site of primary lymphomatous involvement.  4. Stable lucent lesion involving T4.    ABDOMEN AND PELVIS:  1.  Restage lymphoma.  2. Stable mixed sclerotic lytic lesion involving the left iliac wing.  3. No adenopathy in the abdomen and pelvis.  4. None indurated fat containing supraumbilical hernia.    MACRO:  None    Signed by: Simran Banda 12/4/2024 10:27 AM  Dictation workstation:   [id number]   Plan:  Assessment/Plan     49 year old female s/p RT to right arm, left lateral chest and left gluteal for history of lymphoma. Patient is doing well over all. CT from 12/2/24 shows stable disease. Patient having some discomfort in left hip. Xray ordered. If negative can try PT. Continue imaging and follow up with Dr. Maier as scheduled. Patient to return to clinic in 6 months unless needs to be seen sooner.  Number given and instructed to call with any questions or concerns.     I performed this visit using real- time telehealth tools , including an audio/video or telephone connection between Sandra Meredith , who is in their home and Jaqueline Lares CNP at [facility].   Problem List Items Addressed This Visit       B-cell lymphoma    Relevant Orders    Clinic Appointment Request Virtual Est; JAQUELINE LARES (Completed)

## 2024-12-20 DIAGNOSIS — C83.30 DIFFUSE LARGE B-CELL LYMPHOMA, UNSPECIFIED BODY REGION (MULTI): ICD-10-CM

## 2024-12-30 ENCOUNTER — TELEPHONE (OUTPATIENT)
Dept: ADMISSION | Facility: HOSPITAL | Age: 49
End: 2024-12-30
Payer: COMMERCIAL

## 2024-12-30 DIAGNOSIS — C83.30 DIFFUSE LARGE B-CELL LYMPHOMA, UNSPECIFIED BODY REGION (MULTI): ICD-10-CM

## 2024-12-31 RX ORDER — APIXABAN 2.5 MG/1
2.5 TABLET, FILM COATED ORAL 2 TIMES DAILY
Qty: 28 TABLET | Refills: 2 | OUTPATIENT
Start: 2024-12-31

## 2025-01-28 ENCOUNTER — APPOINTMENT (OUTPATIENT)
Dept: NEUROLOGY | Facility: CLINIC | Age: 50
End: 2025-01-28
Payer: COMMERCIAL

## 2025-01-28 VITALS
SYSTOLIC BLOOD PRESSURE: 105 MMHG | RESPIRATION RATE: 16 BRPM | HEART RATE: 69 BPM | DIASTOLIC BLOOD PRESSURE: 69 MMHG | TEMPERATURE: 97.6 F

## 2025-01-28 DIAGNOSIS — R41.840 ATTENTION DEFICIT: ICD-10-CM

## 2025-01-28 DIAGNOSIS — G43.909 MIGRAINE WITHOUT STATUS MIGRAINOSUS, NOT INTRACTABLE, UNSPECIFIED MIGRAINE TYPE: ICD-10-CM

## 2025-01-28 DIAGNOSIS — G43.711 CHRONIC MIGRAINE WITHOUT AURA, INTRACTABLE, WITH STATUS MIGRAINOSUS: Primary | ICD-10-CM

## 2025-01-28 PROCEDURE — 99214 OFFICE O/P EST MOD 30 MIN: CPT | Performed by: NURSE PRACTITIONER

## 2025-01-28 PROCEDURE — 1036F TOBACCO NON-USER: CPT | Performed by: NURSE PRACTITIONER

## 2025-01-28 RX ORDER — LISDEXAMFETAMINE DIMESYLATE 20 MG/1
20 CAPSULE ORAL EVERY MORNING
Qty: 30 CAPSULE | Refills: 0 | Status: SHIPPED | OUTPATIENT
Start: 2025-02-27

## 2025-01-28 RX ORDER — GABAPENTIN 300 MG/1
600 CAPSULE ORAL 2 TIMES DAILY
Qty: 360 CAPSULE | Refills: 1 | Status: SHIPPED | OUTPATIENT
Start: 2025-01-28

## 2025-01-28 RX ORDER — LISDEXAMFETAMINE DIMESYLATE 20 MG/1
20 CAPSULE ORAL EVERY MORNING
Qty: 30 CAPSULE | Refills: 0 | Status: SHIPPED | OUTPATIENT
Start: 2025-03-26

## 2025-01-28 RX ORDER — LISDEXAMFETAMINE DIMESYLATE 20 MG/1
20 CAPSULE ORAL EVERY MORNING
Qty: 30 CAPSULE | Refills: 0 | Status: SHIPPED | OUTPATIENT
Start: 2025-01-28

## 2025-01-28 RX ORDER — ATOGEPANT 60 MG/1
60 TABLET ORAL DAILY
Qty: 90 TABLET | Refills: 1 | Status: SHIPPED | OUTPATIENT
Start: 2025-01-28

## 2025-01-28 ASSESSMENT — PAIN SCALES - GENERAL: PAINLEVEL_OUTOF10: 0-NO PAIN
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
## 2025-01-28 NOTE — ASSESSMENT & PLAN NOTE
Orders:    atogepant (Qulipta) 60 mg tablet tablet; Take 1 tablet (60 mg) by mouth once daily.

## 2025-01-28 NOTE — ASSESSMENT & PLAN NOTE
Orders:    gabapentin (Neurontin) 300 mg capsule; Take 2 capsules (600 mg) by mouth 2 times a day.

## 2025-03-01 DIAGNOSIS — C83.30 DIFFUSE LARGE B-CELL LYMPHOMA, UNSPECIFIED BODY REGION (MULTI): ICD-10-CM

## 2025-03-01 DIAGNOSIS — F41.9 ANXIETY AND DEPRESSION: ICD-10-CM

## 2025-03-01 DIAGNOSIS — F32.A ANXIETY AND DEPRESSION: ICD-10-CM

## 2025-03-01 DIAGNOSIS — E78.5 HYPERLIPIDEMIA, UNSPECIFIED HYPERLIPIDEMIA TYPE: ICD-10-CM

## 2025-03-02 NOTE — TELEPHONE ENCOUNTER
requesting medication refill. Please approve or deny this request.    Rx requested:  Requested Prescriptions     Pending Prescriptions Disp Refills    sertraline (ZOLOFT) 25 MG tablet [Pharmacy Med Name: SERTRALINE HCL TABS 25MG]  0    atorvastatin (LIPITOR) 20 MG tablet [Pharmacy Med Name: ATORVASTATIN TABS 20MG]  0         Last Office Visit:   12/6/2024      Next Visit Date:  Future Appointments   Date Time Provider Department Center   4/16/2025 10:30 AM Warren Vital MD [facility] DEP

## 2025-03-03 DIAGNOSIS — C83.30 DIFFUSE LARGE B-CELL LYMPHOMA, UNSPECIFIED BODY REGION (MULTI): ICD-10-CM

## 2025-03-03 RX ORDER — ATORVASTATIN CALCIUM 20 MG/1
20 TABLET, FILM COATED ORAL DAILY
Qty: 90 TABLET | Refills: 1 | Status: SHIPPED | OUTPATIENT
Start: 2025-03-03

## 2025-03-03 RX ORDER — SERTRALINE HYDROCHLORIDE 25 MG/1
25 TABLET, FILM COATED ORAL DAILY
Qty: 90 TABLET | Refills: 1 | Status: SHIPPED | OUTPATIENT
Start: 2025-03-03

## 2025-03-06 ENCOUNTER — ONCOLOGY MEDICATION OUTREACH (OUTPATIENT)
Dept: HEMATOLOGY/ONCOLOGY | Facility: HOSPITAL | Age: 50
End: 2025-03-06
Payer: COMMERCIAL

## 2025-03-06 DIAGNOSIS — C83.30 DIFFUSE LARGE B-CELL LYMPHOMA, UNSPECIFIED BODY REGION (MULTI): ICD-10-CM

## 2025-03-06 RX ORDER — APIXABAN 2.5 MG/1
2.5 TABLET, FILM COATED ORAL 2 TIMES DAILY
Qty: 60 TABLET | Refills: 1 | OUTPATIENT
Start: 2025-03-06

## 2025-03-06 RX ORDER — APIXABAN 2.5 MG/1
TABLET, FILM COATED ORAL
Refills: 0 | OUTPATIENT
Start: 2025-03-06

## 2025-03-06 NOTE — PROGRESS NOTES
ONCOLOGY CLINICAL PHARMACY NOTE     Subjective  Sandra Meredith is a 49 y.o. female with BCL, here for refill support.        Treatment history  [No matching plan found]     Objective  There were no vitals taken for this visit.  Lab Results   Component Value Date    WBC 6.7 12/02/2024    HGB 13.3 12/02/2024    HCT 40.0 12/02/2024    MCV 82 12/02/2024     12/02/2024      Lab Results   Component Value Date    GLUCOSE 118 (H) 12/02/2024    CALCIUM 9.0 12/02/2024     12/02/2024    K 3.9 12/02/2024    CO2 26 12/02/2024     12/02/2024    BUN 15 12/02/2024    CREATININE 0.75 12/02/2024     Lab Results   Component Value Date    ALT 19 12/02/2024    AST 16 12/02/2024    ALKPHOS 82 12/02/2024    BILITOT 0.5 12/02/2024       Allergies and Medications   Allergies   Allergen Reactions    Adhes. Band-Tape-Benzalkonium Hives    Hi-B-12 Hives    Lovenox [Enoxaparin] Hives    Neosporin Daily Body Hives    Nickel Hives    Zithromax Tri-Devin [Azithromycin] Hives       Current Outpatient Medications:     acetaminophen (Tylenol) 500 mg tablet, Take by mouth., Disp: , Rfl:     apixaban (Eliquis) 2.5 mg tablet, Take 1 tablet (2.5 mg) by mouth 2 times a day., Disp: 60 tablet, Rfl: 2    ascorbic acid (Vitamin C) 500 mg tablet, Take by mouth., Disp: , Rfl:     atogepant (Qulipta) 60 mg tablet tablet, Take 1 tablet (60 mg) by mouth once daily., Disp: 90 tablet, Rfl: 1    calcium carbonate 500 mg calcium (1,250 mg) chewable tablet, Chew 1 tablet (1,250 mg) once daily., Disp: , Rfl:     cholecalciferol (Vitamin D-3) 50 MCG (2000 UT) tablet, Take 1 tablet (2,000 Units) by mouth once daily., Disp: , Rfl:     cyclobenzaprine (Flexeril) 10 mg tablet, Take by mouth., Disp: , Rfl:     folic acid (Folvite) 1 mg tablet, Take 1 tablet (1 mg) by mouth once daily., Disp: , Rfl:     gabapentin (Neurontin) 300 mg capsule, Take 2 capsules (600 mg) by mouth 2 times a day., Disp: 360 capsule, Rfl: 1    garlic 500 mg capsule, Take by mouth.,  Disp: , Rfl:     lisdexamfetamine (Vyvanse) 20 mg capsule, Take 1 capsule (20 mg) by mouth once daily in the morning., Disp: 30 capsule, Rfl: 0    [START ON 3/26/2025] lisdexamfetamine (Vyvanse) 20 mg capsule, Take 1 capsule (20 mg) by mouth once daily in the morning. Do not fill before March 26, 2025., Disp: 30 capsule, Rfl: 0    lisdexamfetamine (Vyvanse) 20 mg capsule, Take 1 capsule (20 mg) by mouth once daily in the morning. Do not fill before February 27, 2025., Disp: 30 capsule, Rfl: 0    magnesium, amino acid chelate, 133 mg tablet, Take 1 tablet (133 mg) by mouth 2 times a day., Disp: , Rfl:     metoprolol succinate XL (Toprol-XL) 100 mg 24 hr tablet, TAKE 1 TABLET TWICE A DAY, Disp: 180 tablet, Rfl: 3    omega-3-dha-epa-dpa-fish oil (Omega MonoPure) 860-260-1,300 mg capsule,delayed release(DR/EC), Take by mouth., Disp: , Rfl:     pantoprazole (ProtoNix) 20 mg EC tablet, Take 2 tablets (40 mg) by mouth once daily., Disp: , Rfl:     potassium gluconate 595 mg (99 mg) tablet, Take 1 tablet by mouth once daily., Disp: , Rfl:     sertraline (Zoloft) 25 mg tablet, Take 1.5 tablets (37.5 mg) by mouth once daily., Disp: , Rfl:     spironolactone (Aldactone) 25 mg tablet, Take 1 tablet (25 mg) by mouth once daily., Disp: 90 tablet, Rfl: 1    thiamine 100 mg tablet, Take 1 tablet (100 mg) by mouth once daily., Disp: , Rfl:     Assessment and Plan  Sandra Meredith is a 49 y.o. female with MM, to be treated with apixaban.    Per Dr. Maier's authorization, on 3/6/2025, I refilled apixaban 2.5 mg twice daily. #60, 2 RF. Prescription sent to [facility].     Eric Griffin, PharmD

## 2025-03-06 NOTE — TELEPHONE ENCOUNTER
Patient calls back to state that she is out of this medication and is being told by her pharmacy that it needs a pre-cert done as well.    Pt has been out of medication for 2 days.    Message forwarded to  Specialty Pharmacy.

## 2025-03-07 DIAGNOSIS — C83.30 DIFFUSE LARGE B-CELL LYMPHOMA, UNSPECIFIED BODY REGION (MULTI): ICD-10-CM

## 2025-03-07 NOTE — TELEPHONE ENCOUNTER
Prior authorization approved:  Authorized from 2/4/25 to 3/6/26.  Left message on pt's identified voicemail informing her.

## 2025-03-24 ENCOUNTER — HOSPITAL ENCOUNTER (OUTPATIENT)
Dept: WOMENS IMAGING | Age: 50
Discharge: HOME | End: 2025-03-26
Payer: COMMERCIAL

## 2025-03-24 DIAGNOSIS — Z12.31 ENCOUNTER FOR SCREENING MAMMOGRAM FOR MALIGNANT NEOPLASM OF BREAST: ICD-10-CM

## 2025-03-24 PROCEDURE — 77063 BREAST TOMOSYNTHESIS BI: CPT

## 2025-03-26 ENCOUNTER — RESULTS FOLLOW-UP (OUTPATIENT)
Age: 50
End: 2025-03-26

## 2025-04-03 RX ORDER — PANTOPRAZOLE SODIUM 20 MG/1
20 TABLET, DELAYED RELEASE ORAL DAILY
Qty: 90 TABLET | Refills: 3 | Status: SHIPPED | OUTPATIENT
Start: 2025-04-03

## 2025-04-16 ENCOUNTER — OFFICE VISIT (OUTPATIENT)
Age: 50
End: 2025-04-16
Payer: COMMERCIAL

## 2025-04-16 VITALS
DIASTOLIC BLOOD PRESSURE: 82 MMHG | TEMPERATURE: 97.1 F | HEART RATE: 76 BPM | SYSTOLIC BLOOD PRESSURE: 124 MMHG | HEIGHT: 66 IN | WEIGHT: 237 LBS | BODY MASS INDEX: 38.09 KG/M2 | OXYGEN SATURATION: 97 %

## 2025-04-16 DIAGNOSIS — K21.9 GASTROESOPHAGEAL REFLUX DISEASE, UNSPECIFIED WHETHER ESOPHAGITIS PRESENT: ICD-10-CM

## 2025-04-16 DIAGNOSIS — E11.9 TYPE 2 DIABETES MELLITUS WITHOUT COMPLICATION, UNSPECIFIED WHETHER LONG TERM INSULIN USE: ICD-10-CM

## 2025-04-16 DIAGNOSIS — C85.10 B-CELL LYMPHOMA, UNSPECIFIED B-CELL LYMPHOMA TYPE, UNSPECIFIED BODY REGION (HCC): ICD-10-CM

## 2025-04-16 DIAGNOSIS — F32.A ANXIETY AND DEPRESSION: ICD-10-CM

## 2025-04-16 DIAGNOSIS — F41.9 ANXIETY AND DEPRESSION: ICD-10-CM

## 2025-04-16 DIAGNOSIS — E78.5 HYPERLIPIDEMIA, UNSPECIFIED HYPERLIPIDEMIA TYPE: Primary | ICD-10-CM

## 2025-04-16 PROCEDURE — 99214 OFFICE O/P EST MOD 30 MIN: CPT | Performed by: FAMILY MEDICINE

## 2025-04-16 RX ORDER — SERTRALINE HYDROCHLORIDE 25 MG/1
37.5 TABLET, FILM COATED ORAL DAILY
Qty: 135 TABLET | Refills: 1 | Status: SHIPPED | OUTPATIENT
Start: 2025-04-16 | End: 2025-10-13

## 2025-04-16 RX ORDER — SERTRALINE HYDROCHLORIDE 25 MG/1
37.5 TABLET, FILM COATED ORAL DAILY
Qty: 135 TABLET | Refills: 1 | Status: SHIPPED | OUTPATIENT
Start: 2025-04-16 | End: 2025-04-16

## 2025-04-16 SDOH — ECONOMIC STABILITY: INCOME INSECURITY: IN THE LAST 12 MONTHS, WAS THERE A TIME WHEN YOU WERE NOT ABLE TO PAY THE MORTGAGE OR RENT ON TIME?: YES

## 2025-04-16 SDOH — ECONOMIC STABILITY: TRANSPORTATION INSECURITY
IN THE PAST 12 MONTHS, HAS THE LACK OF TRANSPORTATION KEPT YOU FROM MEDICAL APPOINTMENTS OR FROM GETTING MEDICATIONS?: NO

## 2025-04-16 SDOH — ECONOMIC STABILITY: FOOD INSECURITY: WITHIN THE PAST 12 MONTHS, YOU WORRIED THAT YOUR FOOD WOULD RUN OUT BEFORE YOU GOT MONEY TO BUY MORE.: NEVER TRUE

## 2025-04-16 SDOH — ECONOMIC STABILITY: FOOD INSECURITY: WITHIN THE PAST 12 MONTHS, THE FOOD YOU BOUGHT JUST DIDN'T LAST AND YOU DIDN'T HAVE MONEY TO GET MORE.: NEVER TRUE

## 2025-04-16 ASSESSMENT — PATIENT HEALTH QUESTIONNAIRE - PHQ9
1. LITTLE INTEREST OR PLEASURE IN DOING THINGS: NOT AT ALL
7. TROUBLE CONCENTRATING ON THINGS, SUCH AS READING THE NEWSPAPER OR WATCHING TELEVISION: NOT AT ALL
SUM OF ALL RESPONSES TO PHQ QUESTIONS 1-9: 2
8. MOVING OR SPEAKING SO SLOWLY THAT OTHER PEOPLE COULD HAVE NOTICED. OR THE OPPOSITE, BEING SO FIGETY OR RESTLESS THAT YOU HAVE BEEN MOVING AROUND A LOT MORE THAN USUAL: NOT AT ALL
5. POOR APPETITE OR OVEREATING: NOT AT ALL
SUM OF ALL RESPONSES TO PHQ QUESTIONS 1-9: 2
1. LITTLE INTEREST OR PLEASURE IN DOING THINGS: NOT AT ALL
9. THOUGHTS THAT YOU WOULD BE BETTER OFF DEAD, OR OF HURTING YOURSELF: NOT AT ALL
2. FEELING DOWN, DEPRESSED OR HOPELESS: NOT AT ALL
8. MOVING OR SPEAKING SO SLOWLY THAT OTHER PEOPLE COULD HAVE NOTICED. OR THE OPPOSITE - BEING SO FIDGETY OR RESTLESS THAT YOU HAVE BEEN MOVING AROUND A LOT MORE THAN USUAL: NOT AT ALL
SUM OF ALL RESPONSES TO PHQ QUESTIONS 1-9: 2
10. IF YOU CHECKED OFF ANY PROBLEMS, HOW DIFFICULT HAVE THESE PROBLEMS MADE IT FOR YOU TO DO YOUR WORK, TAKE CARE OF THINGS AT HOME, OR GET ALONG WITH OTHER PEOPLE: NOT DIFFICULT AT ALL
3. TROUBLE FALLING OR STAYING ASLEEP: SEVERAL DAYS
6. FEELING BAD ABOUT YOURSELF - OR THAT YOU ARE A FAILURE OR HAVE LET YOURSELF OR YOUR FAMILY DOWN: NOT AT ALL
6. FEELING BAD ABOUT YOURSELF - OR THAT YOU ARE A FAILURE OR HAVE LET YOURSELF OR YOUR FAMILY DOWN: NOT AT ALL
9. THOUGHTS THAT YOU WOULD BE BETTER OFF DEAD, OR OF HURTING YOURSELF: NOT AT ALL
4. FEELING TIRED OR HAVING LITTLE ENERGY: SEVERAL DAYS
2. FEELING DOWN, DEPRESSED OR HOPELESS: NOT AT ALL
10. IF YOU CHECKED OFF ANY PROBLEMS, HOW DIFFICULT HAVE THESE PROBLEMS MADE IT FOR YOU TO DO YOUR WORK, TAKE CARE OF THINGS AT HOME, OR GET ALONG WITH OTHER PEOPLE: NOT DIFFICULT AT ALL
7. TROUBLE CONCENTRATING ON THINGS, SUCH AS READING THE NEWSPAPER OR WATCHING TELEVISION: NOT AT ALL
SUM OF ALL RESPONSES TO PHQ QUESTIONS 1-9: 2
SUM OF ALL RESPONSES TO PHQ QUESTIONS 1-9: 2
3. TROUBLE FALLING OR STAYING ASLEEP: SEVERAL DAYS
4. FEELING TIRED OR HAVING LITTLE ENERGY: SEVERAL DAYS
5. POOR APPETITE OR OVEREATING: NOT AT ALL

## 2025-04-16 NOTE — PROGRESS NOTES
Chief Complaint   Patient presents with    3 Month Follow-Up     No issues/concerns        HPI: Simi Malcolm 50 y.o. female presenting for   History of Present Illness        Occasional discomfort in the left lower hip area is managed through stretching exercises.      Anxiety depression   Will see the psychology/psych - [facility] in [place]   Patient will be on klonopin BID PRN - did not have to thierno it since the biopsy.   And will start the Zoloft 25 mg daily   Stable at this time.   Plans to start the PTSD treatment.     F/u   Patient is doing well on the zoloft on 1.5 tablets of hte zoloft.   Doing great. Denies any Si or HI  Patient is taking vyvanse and quilipta.    Neuro also managing her gabapentin     Anxiety depression   Will see the psychology/psych - [facility] in [place]   Patient will be on klonopin BID PRN - did not have to thierno it since the biopsy.   And will start the Zoloft 25 mg daily   Stable at this time.   Plans to start the PTSD treatment.     F/u   Patient is doing well on the zoloft on 1.5 tablets of hte zoloft.   Doing great. Denies any Si or HI  Patient is taking vyvanse and quilipta.        Lymphoma B cell diffuse   Had her 5 treatment recently.    Is on steroids   Does exercise 10 minutes on the recumbent bike does elevate the heart rate.      F/u   Finishing up chemo   Still on steroids   Follows up with oncology.      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    Alexia is looking at other treatments to help wit hthe cancer treatments.    Patient rpeorts taht she was having swelling and difficulty breathing earlier in the January. Aidan was found to have clots in the superior vena cava where the port was done.  (Has since removed. Patient also had clot in the jugular veins)   Patient also has tachycardia- manged by cardiology. Unsure

## 2025-05-11 DIAGNOSIS — C83.30 DIFFUSE LARGE B-CELL LYMPHOMA, UNSPECIFIED BODY REGION (MULTI): ICD-10-CM

## 2025-05-12 DIAGNOSIS — Z92.21 STATUS POST ADMINISTRATION OF CARDIOTOXIC CHEMOTHERAPY: ICD-10-CM

## 2025-05-12 DIAGNOSIS — R94.31 ABNORMAL ECG: ICD-10-CM

## 2025-05-12 DIAGNOSIS — Z92.21 STATUS POST ADMINISTRATION OF CARDIOTOXIC CHEMOTHERAPY: Primary | ICD-10-CM

## 2025-05-12 RX ORDER — SPIRONOLACTONE 25 MG/1
25 TABLET ORAL DAILY
Qty: 90 TABLET | Refills: 1 | Status: SHIPPED | OUTPATIENT
Start: 2025-05-12 | End: 2025-11-08

## 2025-06-02 ENCOUNTER — HOSPITAL ENCOUNTER (OUTPATIENT)
Dept: RADIOLOGY | Facility: HOSPITAL | Age: 50
Discharge: HOME | End: 2025-06-02
Payer: COMMERCIAL

## 2025-06-02 ENCOUNTER — APPOINTMENT (OUTPATIENT)
Dept: CARDIOLOGY | Facility: CLINIC | Age: 50
End: 2025-06-02
Payer: COMMERCIAL

## 2025-06-02 DIAGNOSIS — C83.30 DIFFUSE LARGE B-CELL LYMPHOMA, UNSPECIFIED BODY REGION (MULTI): ICD-10-CM

## 2025-06-02 LAB
CREAT SERPL-MCNC: 0.8 MG/DL (ref 0.6–1.3)
GFR SERPL CREATININE-BSD FRML MDRD: 90 ML/MIN/1.73M*2

## 2025-06-02 PROCEDURE — 71260 CT THORAX DX C+: CPT

## 2025-06-02 PROCEDURE — 82565 ASSAY OF CREATININE: CPT

## 2025-06-02 PROCEDURE — 2550000001 HC RX 255 CONTRASTS

## 2025-06-02 PROCEDURE — 71260 CT THORAX DX C+: CPT | Performed by: RADIOLOGY

## 2025-06-02 PROCEDURE — 74177 CT ABD & PELVIS W/CONTRAST: CPT | Performed by: RADIOLOGY

## 2025-06-02 RX ADMIN — IOHEXOL 75 ML: 350 INJECTION, SOLUTION INTRAVENOUS at 10:40

## 2025-06-04 ENCOUNTER — APPOINTMENT (OUTPATIENT)
Dept: CARDIOLOGY | Facility: CLINIC | Age: 50
End: 2025-06-04
Payer: COMMERCIAL

## 2025-06-05 DIAGNOSIS — R41.840 ATTENTION DEFICIT: ICD-10-CM

## 2025-06-05 RX ORDER — LISDEXAMFETAMINE DIMESYLATE 20 MG/1
20 CAPSULE ORAL EVERY MORNING
Qty: 30 CAPSULE | Refills: 0 | Status: SHIPPED | OUTPATIENT
Start: 2025-07-04

## 2025-06-05 RX ORDER — LISDEXAMFETAMINE DIMESYLATE 20 MG/1
20 CAPSULE ORAL EVERY MORNING
Qty: 30 CAPSULE | Refills: 0 | Status: SHIPPED | OUTPATIENT
Start: 2025-06-05

## 2025-06-11 ENCOUNTER — APPOINTMENT (OUTPATIENT)
Dept: HEMATOLOGY/ONCOLOGY | Facility: HOSPITAL | Age: 50
End: 2025-06-11
Payer: COMMERCIAL

## 2025-06-17 ENCOUNTER — APPOINTMENT (OUTPATIENT)
Dept: RADIATION ONCOLOGY | Facility: HOSPITAL | Age: 50
End: 2025-06-17
Payer: COMMERCIAL

## 2025-06-20 ENCOUNTER — APPOINTMENT (OUTPATIENT)
Facility: CLINIC | Age: 50
End: 2025-06-20
Payer: COMMERCIAL

## 2025-06-20 ENCOUNTER — TELEPHONE (OUTPATIENT)
Dept: CARDIOLOGY | Facility: HOSPITAL | Age: 50
End: 2025-06-20
Payer: COMMERCIAL

## 2025-06-20 DIAGNOSIS — R94.31 ABNORMAL ECG: ICD-10-CM

## 2025-06-20 DIAGNOSIS — R00.2 PALPITATIONS: ICD-10-CM

## 2025-06-20 DIAGNOSIS — Z92.21 STATUS POST ADMINISTRATION OF CARDIOTOXIC CHEMOTHERAPY: Primary | ICD-10-CM

## 2025-06-20 NOTE — TELEPHONE ENCOUNTER
butchce   Sandra Meredith (Patient)    Subject   Sandra Meredith (Patient)    Topic   Appointment Scheduled      Communication   Hello      Pt called in to reschedule her appt that were cancelled due to providers not being in office. She is requesting to have her referral for Echo Cardiogram be resent from a 3D to 2D due to insurance not being able to cover the other test. Can someone look into this please ?      Thanks so much :)        Patient Information    Patient Name Gender  SSN   Sandra Meredith Female [id number]     Notes    Maryana Ramachandran RN   2025  9:55 AM   Echo ordered by pauline Tariq NP in cardiology. Routing message to their pool.

## 2025-06-24 ENCOUNTER — HOSPITAL ENCOUNTER (OUTPATIENT)
Dept: RADIATION ONCOLOGY | Facility: HOSPITAL | Age: 50
Setting detail: RADIATION/ONCOLOGY SERIES
Discharge: HOME | End: 2025-06-24
Payer: COMMERCIAL

## 2025-06-24 ENCOUNTER — APPOINTMENT (OUTPATIENT)
Dept: HEMATOLOGY/ONCOLOGY | Facility: HOSPITAL | Age: 50
End: 2025-06-24
Payer: COMMERCIAL

## 2025-06-24 DIAGNOSIS — C83.30 DIFFUSE LARGE B-CELL LYMPHOMA, UNSPECIFIED BODY REGION (MULTI): Primary | ICD-10-CM

## 2025-06-24 PROCEDURE — 99213 OFFICE O/P EST LOW 20 MIN: CPT | Performed by: NURSE PRACTITIONER

## 2025-06-24 PROCEDURE — 99213 OFFICE O/P EST LOW 20 MIN: CPT | Mod: 95 | Performed by: NURSE PRACTITIONER

## 2025-06-24 ASSESSMENT — ENCOUNTER SYMPTOMS
CHILLS: 0
JOINT SWELLING: 0
BACK PAIN: 0
DIAPHORESIS: 0
PSYCHIATRIC NEGATIVE: 1
APPETITE CHANGE: 0
MYALGIAS: 0
FEVER: 0
RESPIRATORY NEGATIVE: 1
NECK PAIN: 0
FATIGUE: 0
ARTHRALGIAS: 0
HEMATOLOGIC/LYMPHATIC NEGATIVE: 1
CARDIOVASCULAR NEGATIVE: 1
NEUROLOGICAL NEGATIVE: 1
GASTROINTESTINAL NEGATIVE: 1
ACTIVITY CHANGE: 0
UNEXPECTED WEIGHT CHANGE: 0
NECK STIFFNESS: 0

## 2025-06-24 NOTE — PROGRESS NOTES
"  Patient ID: [id number]   Lymphoma, diagnosed in August 2022 after a fall and a pathological right humeral fracture at the end of July. Reports right arm \"deep bone pain\" for several months  prior. S/p right arm open reduction internal fixation in August.   13 x 3 x 4 cm mass involving the mid and proximal humeral diaphysis   Not double expresser, no MYC translocation, final specification diffuse large B-cell lymphoma NOS.   Ki-67 80%  r-IPI score of 2, considered favorable, with 80% projected 4 years BFS.   As primary bone lymphoma, stage, and age appears most prognostic.     PET scan 11 August 2022 showed uptake in the right humerus, left posterior pleura, left iliac bone and left external iliac lymph nodes.      Received first cycle R-CHOP on 2 Sept 2022.  Right arm radiation September 12 to September 23, 2022 in 10 fractions.    Developed painful disease in the left lateral chest region. Went ahead and treated this site palliatively.   Treatment Area #1  Location : l lateral chest  Dates : 10/3/2022 to 10/17/2022  Number of Treatments : 10  Dose : 3000  Modality : photons    Treated left gluteal region for indolent lymphoma   Treatment Area #1  Location : L gluteal  Dates : 3/2-3/22/2023  Number of Treatments : 15  Dose : 3000cGy  Modality : photons    History of presenting illness    Telehealth visit with Sandra Meredith today. is a 50 y.o. female who presents today for follow up 2 years and 3 months s/p RT to the L gluteal for indolent lymphoma. She was treated in 2022 to the right arm and left lateral chest.  Patient states she is doing well. Energy is baseline.  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  Appetite is good. She is intentionally losing some weight. Denies night sweats, fevers and chills. Denies chest pain.   Patient was admitted in Feb 2023 with upper body swelling, and was found to have acute non-occlusive deep vein thrombosis in IJV, and IVC " occlusion, caused by Mediport, (later removed),  s/p SVC PTA, MECHANICAL THROMBECTOMY of SVC with IR Dr Ortiz on 3/29/23. She was started on Apixaban. Continues on low dose. No longer bruising as easily as she was. Denies hip pain or any new sites of pain. CT CAP from 6/2024 stable. Follow up with Dr. Maier 7/1/25.     Review of systems:  Review of Systems   Constitutional:  Negative for activity change, appetite change, chills, diaphoresis, fatigue, fever and unexpected weight change.   HENT: Negative.     Respiratory: Negative.     Cardiovascular: Negative.    Gastrointestinal: Negative.    Genitourinary: Negative.    Musculoskeletal:  Negative for arthralgias, back pain, gait problem, joint swelling, myalgias, neck pain and neck stiffness.   Skin: Negative.    Neurological: Negative.    Hematological: Negative.    Psychiatric/Behavioral: Negative.         Past Medical history  She  has a past surgical history that includes Other surgical history (08/12/2022); Other surgical history (08/12/2022); US guided biopsy lymph node superficial (01/30/2023); and Vascular surgery (2022).      Radiology   CT chest abdomen pelvis w IV contrast 06/02/2025    Narrative  Interpreted By:  Koko Cosme,  STUDY:  CT CHEST ABDOMEN PELVIS W IV CONTRAST;  6/2/2025 10:42 am    INDICATION:  Signs/Symptoms:restage lymohoma.    COMPARISON:  12/02/2024 and 02/2024    ACCESSION NUMBER(S):  [id number]    ORDERING CLINICIAN:  SMITH PINEDA    TECHNIQUE:  Contiguous axial images of the chest, abdomen, and pelvis were  obtained after the intravenous administration of  mL Omnipaque 350  contrast.  Coronal and sagittal reformatted images were reconstructed  from the axial data.      FINDINGS:  CT CHEST:    Left lower lobe 3.7 x 3.7 cm partially calcified soft tissues lesion  again detected not changed from prior examination. Small sliding  hiatal hernia    No growing axillary, hilar or mediastinal lymph nodes. No  pneumothorax. No pleural  effusion. No growing pulmonary nodules. No  acute osseous abnormality.    Postsurgical changes seen in the right proximal humerus    CT ABDOMEN/PELVIS:  Mild hepatic steatosis. No aneurysm. Unremarkable adrenal glands and  pancreas.    Stable spleen. No retroperitoneal adenopathy. No enlarging pelvic  masses. Small umbilical hernia containing fat. No hydronephrosis or  suspicious renal lesion.    No suspicious osseous lesions. No free air or free fluid.    Moderate constipation. Diverticulosis. No overt stigmata of  diverticulitis.    Low-lying urinary bladder.    Impression  Stable examination. No features of disease progression with reported  history of lymphoma.    Signed by: Koko Cosme 6/2/2025 6:38 PM  Dictation workstation:   [id number]  Plan:  Assessment/Plan     50 year old female s/p RT to right arm, left lateral chest and left gluteal for history of lymphoma. Patient is doing well over all. No acute complaints related to treatment. No new areas of pain. CT from 6/2/25 shows stable disease. Continue imaging and follow up with Dr. Maier as scheduled. Patient to return to clinic in 6 months unless needs to be seen sooner.  Number given and instructed to call with any questions or concerns.     Virtual or Telephone Consent    While technically available, the patient was unable or unwilling to consent to connect via audio/video telehealth technology; therefore, I performed this visit using a real-time audio only connection between Sandra Meredith & CARLOS Neff.  Verbal consent was requested and obtained from Sandra Meredith on this date, 06/24/25 for a telehealth visit and the patient's location was confirmed at the time of the visit.   Problem List Items Addressed This Visit       B-cell lymphoma    Relevant Orders    Clinic Appointment Request Virtual Est; CARLOS MONTES (Completed)          HPI  Physical Exam

## 2025-07-01 ENCOUNTER — OFFICE VISIT (OUTPATIENT)
Dept: HEMATOLOGY/ONCOLOGY | Facility: HOSPITAL | Age: 50
End: 2025-07-01
Payer: COMMERCIAL

## 2025-07-01 ENCOUNTER — HOSPITAL ENCOUNTER (OUTPATIENT)
Dept: RADIOLOGY | Facility: HOSPITAL | Age: 50
Discharge: HOME | End: 2025-07-01
Payer: COMMERCIAL

## 2025-07-01 ENCOUNTER — LAB (OUTPATIENT)
Dept: LAB | Facility: HOSPITAL | Age: 50
End: 2025-07-01
Payer: COMMERCIAL

## 2025-07-01 VITALS
HEIGHT: 65 IN | WEIGHT: 234.57 LBS | TEMPERATURE: 97.5 F | OXYGEN SATURATION: 98 % | DIASTOLIC BLOOD PRESSURE: 60 MMHG | SYSTOLIC BLOOD PRESSURE: 95 MMHG | RESPIRATION RATE: 17 BRPM | BODY MASS INDEX: 39.08 KG/M2 | HEART RATE: 65 BPM

## 2025-07-01 DIAGNOSIS — C83.30 DIFFUSE LARGE B-CELL LYMPHOMA, UNSPECIFIED BODY REGION (MULTI): ICD-10-CM

## 2025-07-01 DIAGNOSIS — C83.30 DIFFUSE LARGE B-CELL LYMPHOMA, UNSPECIFIED BODY REGION (MULTI): Primary | ICD-10-CM

## 2025-07-01 LAB
ALBUMIN SERPL BCP-MCNC: 4.4 G/DL (ref 3.4–5)
ALP SERPL-CCNC: 93 U/L (ref 33–110)
ALT SERPL W P-5'-P-CCNC: 22 U/L (ref 7–45)
ANION GAP SERPL CALC-SCNC: 12 MMOL/L (ref 10–20)
AST SERPL W P-5'-P-CCNC: 18 U/L (ref 9–39)
B2 MICROGLOB SERPL-MCNC: 1.8 MG/L (ref 0.7–2.2)
BASOPHILS # BLD AUTO: 0.03 X10*3/UL (ref 0–0.1)
BASOPHILS NFR BLD AUTO: 0.4 %
BILIRUB SERPL-MCNC: 0.7 MG/DL (ref 0–1.2)
BUN SERPL-MCNC: 14 MG/DL (ref 6–23)
CALCIUM SERPL-MCNC: 9.7 MG/DL (ref 8.6–10.3)
CHLORIDE SERPL-SCNC: 103 MMOL/L (ref 98–107)
CO2 SERPL-SCNC: 28 MMOL/L (ref 21–32)
CREAT SERPL-MCNC: 0.8 MG/DL (ref 0.5–1.05)
EGFRCR SERPLBLD CKD-EPI 2021: 90 ML/MIN/1.73M*2
EOSINOPHIL # BLD AUTO: 0.05 X10*3/UL (ref 0–0.7)
EOSINOPHIL NFR BLD AUTO: 0.7 %
ERYTHROCYTE [DISTWIDTH] IN BLOOD BY AUTOMATED COUNT: 13.5 % (ref 11.5–14.5)
ERYTHROCYTE [SEDIMENTATION RATE] IN BLOOD BY WESTERGREN METHOD: 18 MM/H (ref 0–20)
GLUCOSE SERPL-MCNC: 125 MG/DL (ref 74–99)
HCT VFR BLD AUTO: 42.1 % (ref 36–46)
HGB BLD-MCNC: 14 G/DL (ref 12–16)
IGA SERPL-MCNC: 83 MG/DL (ref 70–400)
IGG SERPL-MCNC: 713 MG/DL (ref 700–1600)
IGM SERPL-MCNC: 50 MG/DL (ref 40–230)
IMM GRANULOCYTES # BLD AUTO: 0.02 X10*3/UL (ref 0–0.7)
IMM GRANULOCYTES NFR BLD AUTO: 0.3 % (ref 0–0.9)
LDH SERPL L TO P-CCNC: 242 U/L (ref 84–246)
LYMPHOCYTES # BLD AUTO: 1.82 X10*3/UL (ref 1.2–4.8)
LYMPHOCYTES NFR BLD AUTO: 27.2 %
MAGNESIUM SERPL-MCNC: 2.17 MG/DL (ref 1.6–2.4)
MCH RBC QN AUTO: 27.8 PG (ref 26–34)
MCHC RBC AUTO-ENTMCNC: 33.3 G/DL (ref 32–36)
MCV RBC AUTO: 84 FL (ref 80–100)
MONOCYTES # BLD AUTO: 0.63 X10*3/UL (ref 0.1–1)
MONOCYTES NFR BLD AUTO: 9.4 %
NEUTROPHILS # BLD AUTO: 4.15 X10*3/UL (ref 1.2–7.7)
NEUTROPHILS NFR BLD AUTO: 62 %
NRBC BLD-RTO: 0 /100 WBCS (ref 0–0)
PLATELET # BLD AUTO: 298 X10*3/UL (ref 150–450)
POTASSIUM SERPL-SCNC: 4.1 MMOL/L (ref 3.5–5.3)
PROT SERPL-MCNC: 6.7 G/DL (ref 6.4–8.2)
PROT SERPL-MCNC: 6.9 G/DL (ref 6.4–8.2)
RBC # BLD AUTO: 5.03 X10*6/UL (ref 4–5.2)
SODIUM SERPL-SCNC: 139 MMOL/L (ref 136–145)
WBC # BLD AUTO: 6.7 X10*3/UL (ref 4.4–11.3)

## 2025-07-01 PROCEDURE — 84155 ASSAY OF PROTEIN SERUM: CPT

## 2025-07-01 PROCEDURE — 36415 COLL VENOUS BLD VENIPUNCTURE: CPT

## 2025-07-01 PROCEDURE — 73060 X-RAY EXAM OF HUMERUS: CPT | Mod: LEFT SIDE | Performed by: RADIOLOGY

## 2025-07-01 PROCEDURE — 85652 RBC SED RATE AUTOMATED: CPT

## 2025-07-01 PROCEDURE — 80053 COMPREHEN METABOLIC PANEL: CPT

## 2025-07-01 PROCEDURE — 84165 PROTEIN E-PHORESIS SERUM: CPT

## 2025-07-01 PROCEDURE — 3008F BODY MASS INDEX DOCD: CPT | Performed by: INTERNAL MEDICINE

## 2025-07-01 PROCEDURE — 82784 ASSAY IGA/IGD/IGG/IGM EACH: CPT

## 2025-07-01 PROCEDURE — 83521 IG LIGHT CHAINS FREE EACH: CPT

## 2025-07-01 PROCEDURE — 82232 ASSAY OF BETA-2 PROTEIN: CPT

## 2025-07-01 PROCEDURE — 73060 X-RAY EXAM OF HUMERUS: CPT | Mod: LT

## 2025-07-01 PROCEDURE — 85025 COMPLETE CBC W/AUTO DIFF WBC: CPT

## 2025-07-01 PROCEDURE — 83615 LACTATE (LD) (LDH) ENZYME: CPT

## 2025-07-01 PROCEDURE — 1036F TOBACCO NON-USER: CPT | Performed by: INTERNAL MEDICINE

## 2025-07-01 PROCEDURE — 99214 OFFICE O/P EST MOD 30 MIN: CPT | Performed by: INTERNAL MEDICINE

## 2025-07-01 PROCEDURE — 83735 ASSAY OF MAGNESIUM: CPT

## 2025-07-01 ASSESSMENT — PAIN SCALES - GENERAL: PAINLEVEL_OUTOF10: 4

## 2025-07-01 NOTE — PROGRESS NOTES
"Patient ID: Sandra Meredith is a 50 y.o. female.  Oncologic history:  Lymphoma, diagnosed in August 2022 after a fall and a pathological right humeral fracture at the end of July. Reports right arm \"deep bone pain\" for several months  prior. S/p right arm open reduction internal fixation in August.   13 x 3 x 4 cm mass involving the mid and proximal humeral diaphysis   Not double expresser, no MYC translocation, final specification diffuse large B-cell lymphoma NOS.   Ki-67 80%  r-IPI score of 2, considered favorable, with 80% projected 4 years BFS.   As primary bone lymphoma, stage, and age appears most prognostic.     PET scan 11 August 2022 showed uptake in the right humerus, left posterior pleura, left iliac bone and left external iliac lymph nodes.      Received first cycle R-CHOP on 2 Sept 2022.  Right arm radiation September 12 to September 23, 2022 in 10 fractions.  We do not recommend further radiation to any other sites of disease  Cycle 2 R-CHOP given on September 23, 2022  -Restaging PET scan denied by insurance.  Planning CT chest abdomen pelvis after cycle 3 of chemotherapy  -Cycle 3 R-CHOP on 10/14/2022  -Cycle 4 R-CHOP given 11/4/2022.  Tolerating well, asymptomatic.  Left chest wall pain resolved.  CT scan of the -Chest abdomen pelvis November 8, 2022 showed sclerotic bone changes but could not elucidate the bone disease accurately, L4 lytic area under  observation, no intra-abdominal disease, no colitis colitis, check radiculitis.     -Evaluation 11/23/2022 tolerating well, epigastric discomfort intermittent chronic and stable.  On PPI.  Occasional loose stools sending C. difficile and enteric pathogens.  CT showed abundance of stools in the colon suggest Metamucil to regulate her  bowel movement.  She is taking probiotics as well.     -Cycle 5 R-CHOP November 25, 2022  -Cycle 6 R-CHOP 12/16/2022  -End of therapy PET scan shows a CR except for a [place] 4 residual small focal activity in the left " gluteus muscle at site of prior lymphoma mass.  -Discussion at tumor board January 2023, plan to attempt at biopsy of residual activity in the left gluteus muscle area under CT or ultrasound guidance  -Evaluation January 18, 2023: Increased dyspnea on minimal exertion, CTA negative for PE, concerned about filling defect around the catheter tip, not mentioned in the report, could be artifact and flow effect, recommended that patient go to the emergency  room for venous Doppler of the bilateral IJ and subclavian veins.  -Admitted mid-February 2023 with progressive SVC syndrome, on anticoagulation, enoxaparin, to keep for now additional 2 to 3 weeks and then transition to apixaban.  Mild elevation in anticardiolipin and beta-2 glycoprotein 1 antibody, repeat.  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  Planning radiation therapy, starting 3/2/2023 in [place].  -Plan evaluation for possible CAR-T therapy.  Other options include observation or rituximab maintenance as we would normally do in treating [place] patients after 6 cycles of R-CHOP.  -Radiation therapy to residual activity left pelvic/gluteus area completed March 22, 2023, 30 Marroquin delivered.  -Planned for SVC thrombectomy by interventional radiology the week of March 27, 2023.  -Evaluation March 22, 2023, doing well, rash secondary to enoxaparin, switch to apixaban, plan to evaluate mid May 2023 with PET scan.  Patient is about 3 months out from the end of her chemoimmunotherapy.  -Plan to be evaluated April 2023 by Dr. Grover for consideration of CAR-T consolidation.  -PET scan April 14 2023 no residual lymphoma.   -Met with Dr. Grover, decided against CAR-T and to watch expectantly at this time.  -CT scan chest abdomen pelvis on August 16, 2023 no evidence of disease  -Evaluation 8/26/2023: Asymptomatic no evidence of disease  "clinically radiographically or by labs.  Plan to see back in 4 months with CBC CMP LDH CT chest abdomen pelvis and ESR.  Plan to talk over the phone in 1-3 to 4 weeks after a D-dimer and echocardiogram  and to discuss option of reducing apixaban to 2.5 mg p.o. twice daily to finish a year of anticoagulation before final discontinuation.   -Phone visit 9/20/2023, reviewed D-dimer, normal, instructed to reduce apixaban to 2.5 mg p.o. twice daily for another 6 months through March 2024.  -Evaluation December 20, 2023: 1 year since end of therapy in December 2023, doing well, recent imaging December 13, 2023 shows no evidence of disease stable sclerotic bone changes.  -Evaluation 6/13/2024, 1 and half years out from end of therapy in December 2023, asymptomatic.  PET scan on May 2, 2024 showed no evidence of disease.  Episode of lightheadedness February 2024, no PE by CTA, continued on apixaban.  -CT 6/2 TAYLOR  -Eval 7/1/2025: Doing well. Left arm discomfort started after minor injury mild but persistent. Check plain humerus x-ray. If negative, watch for now.     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 twice daily September 2023, plan to discontinue and switch to 162 mg of aspirin March 2024. Stayed on maintenance 2.5 BID apixaban. n  Iron deficiency, no documented bleeding, following with GI.  To discuss with patient GI follow-up  Subjective    HPI  Health OK.  Left arm pain about 1.5 mo. Injured itt putting in a tub. No fewver chills night sweats   No SOB.   No bleeding  Left arem pain constant. 2/10. Arm to elbow. Twisting makes it worse. No pain elswhere.   Objective    BSA: 2.21 meters squared  BP 95/60   Pulse 65   Temp 36.4 °C (97.5 °F)   Resp 17   Ht (S) 1.659 m (5' 5.32\")   Wt 106 kg (234 lb 9.1 oz)   SpO2 98%   BMI 38.66 kg/m²      Physical Exam  Alert oriented not in distress not pale or jaundiced  Lymph " nodes: No adenopathy  Oral mucosa negative, no mucositis  Head is normocephalic atraumatic, pupils equal reactive  Neck is supple no adenopathy, no thyromegaly  Lungs show equal air entry, no crackles or wheezing, equal percussion  Heart shows regular rate and rhythm normal S1-S2 no murmurs or gallop rhythm  Abdomen is soft nontender, moves with respiration, no hepatosplenomegaly or masses, positive bowel sounds, not distended.  Lower extremities show no edema  Neurologically grossly nonfocal  Psych: Normal affect    Performance Status:  Symptomatic; fully ambulatory      Assessment/Plan   DLBCL r-IPI 2  SVC syndrome  on chronic maintenance apixaban    TAYLOR    Mild left arm pain, check humerus x-ray if no lesion observe. Occurred after injury. If symptoms intensify or persist she will inform us  OV 1/6/26 Lena and me in 1 yr  No imaging unless clinically indicated  Labs today and return in 6 mo       Cancer Staging   No matching staging information was found for the patient.      Oncology History    No history exists.        Diagnoses and all orders for this visit:  Diffuse large B-cell lymphoma, unspecified body region (Multi)  -     Clinic Appointment Request BSEISO, ALI W  -     Lactate Dehydrogenase; Future  -     Comprehensive Metabolic Panel; Future  -     CBC and Auto Differential; Future  -     Magnesium; Future  -     [place]/Lambda Free Light Chain, Serum; Future  -     Immunoglobulins (IgG, IgA, IgM); Future  -     Serum Protein Electrophoresis + Immunofixation; Future  -     Beta 2 Microglobuin; Future  -     Lactate Dehydrogenase; Future  -     Comprehensive Metabolic Panel; Future  -     CBC and Auto Differential; Future  -     Magnesium; Future  -     [place]/Lambda Free Light Chain, Serum; Future  -     Immunoglobulins (IgG, IgA, IgM); Future  -     Serum Protein Electrophoresis + Immunofixation; Future  -     Beta 2 Microglobuin; Future  -     Sedimentation Rate; Future  -     XR humerus left;  Future           Connie Maier MD

## 2025-07-02 DIAGNOSIS — R00.0 TACHYCARDIA, UNSPECIFIED: ICD-10-CM

## 2025-07-02 LAB
KAPPA LC SERPL-MCNC: 1.02 MG/DL (ref 0.33–1.94)
KAPPA LC/LAMBDA SER: 0.89 {RATIO} (ref 0.26–1.65)
LAMBDA LC SERPL-MCNC: 1.15 MG/DL (ref 0.57–2.63)

## 2025-07-02 RX ORDER — METOPROLOL SUCCINATE 100 MG/1
100 TABLET, EXTENDED RELEASE ORAL 2 TIMES DAILY
Qty: 180 TABLET | Refills: 0 | Status: SHIPPED | OUTPATIENT
Start: 2025-07-02

## 2025-07-03 LAB
ALBUMIN: 4.3 G/DL (ref 3.4–5)
ALPHA 1 GLOBULIN: 0.3 G/DL (ref 0.2–0.6)
ALPHA 2 GLOBULIN: 0.7 G/DL (ref 0.4–1.1)
BETA GLOBULIN: 0.8 G/DL (ref 0.5–1.2)
GAMMA GLOBULIN: 0.6 G/DL (ref 0.5–1.4)
IMMUNOFIXATION COMMENT: NORMAL
PATH REVIEW - SERUM IMMUNOFIXATION: NORMAL
PATH REVIEW-SERUM PROTEIN ELECTROPHORESIS: NORMAL
PROTEIN ELECTROPHORESIS COMMENT: NORMAL

## 2025-07-17 ENCOUNTER — APPOINTMENT (OUTPATIENT)
Dept: CARDIOLOGY | Facility: CLINIC | Age: 50
End: 2025-07-17
Payer: COMMERCIAL

## 2025-07-22 ENCOUNTER — APPOINTMENT (OUTPATIENT)
Dept: NEUROLOGY | Facility: CLINIC | Age: 50
End: 2025-07-22
Payer: COMMERCIAL

## 2025-07-28 DIAGNOSIS — G43.711 CHRONIC MIGRAINE WITHOUT AURA, INTRACTABLE, WITH STATUS MIGRAINOSUS: ICD-10-CM

## 2025-07-29 RX ORDER — ATOGEPANT 60 MG/1
60 TABLET ORAL DAILY
Qty: 90 TABLET | Refills: 0 | Status: SHIPPED | OUTPATIENT
Start: 2025-07-29

## 2025-08-08 DIAGNOSIS — R41.840 ATTENTION DEFICIT: ICD-10-CM

## 2025-08-08 RX ORDER — LISDEXAMFETAMINE DIMESYLATE 20 MG/1
20 CAPSULE ORAL EVERY MORNING
Qty: 30 CAPSULE | Refills: 0 | Status: SHIPPED | OUTPATIENT
Start: 2025-08-08

## 2025-08-08 NOTE — TELEPHONE ENCOUNTER
LV 1/28/25 Next 8/29/25  (Patient was scheduled for 7/22 but then we had to cancel due to your vacation and was rescheduled for 8/29)

## 2025-08-19 DIAGNOSIS — E78.5 HYPERLIPIDEMIA, UNSPECIFIED HYPERLIPIDEMIA TYPE: ICD-10-CM

## 2025-08-19 RX ORDER — ATORVASTATIN CALCIUM 20 MG/1
20 TABLET, FILM COATED ORAL DAILY
Qty: 90 TABLET | Refills: 1 | Status: SHIPPED | OUTPATIENT
Start: 2025-08-19

## 2025-08-29 ENCOUNTER — APPOINTMENT (OUTPATIENT)
Dept: NEUROLOGY | Facility: CLINIC | Age: 50
End: 2025-08-29
Payer: COMMERCIAL

## 2025-08-29 VITALS
WEIGHT: 233.3 LBS | SYSTOLIC BLOOD PRESSURE: 97 MMHG | HEIGHT: 66 IN | DIASTOLIC BLOOD PRESSURE: 66 MMHG | BODY MASS INDEX: 37.49 KG/M2 | HEART RATE: 60 BPM

## 2025-08-29 DIAGNOSIS — Z79.899 HIGH RISK MEDICATION USE: ICD-10-CM

## 2025-08-29 DIAGNOSIS — R41.840 ATTENTION DEFICIT: ICD-10-CM

## 2025-08-29 DIAGNOSIS — G43.711 CHRONIC MIGRAINE WITHOUT AURA, INTRACTABLE, WITH STATUS MIGRAINOSUS: ICD-10-CM

## 2025-08-29 DIAGNOSIS — G43.909 MIGRAINE WITHOUT STATUS MIGRAINOSUS, NOT INTRACTABLE, UNSPECIFIED MIGRAINE TYPE: Primary | ICD-10-CM

## 2025-08-29 PROCEDURE — 1036F TOBACCO NON-USER: CPT | Performed by: NURSE PRACTITIONER

## 2025-08-29 PROCEDURE — 99214 OFFICE O/P EST MOD 30 MIN: CPT | Performed by: NURSE PRACTITIONER

## 2025-08-29 PROCEDURE — 3008F BODY MASS INDEX DOCD: CPT | Performed by: NURSE PRACTITIONER

## 2025-08-29 PROCEDURE — 99212 OFFICE O/P EST SF 10 MIN: CPT

## 2025-08-29 RX ORDER — LISDEXAMFETAMINE DIMESYLATE 20 MG/1
20 CAPSULE ORAL EVERY MORNING
Qty: 30 CAPSULE | Refills: 0 | Status: SHIPPED | OUTPATIENT
Start: 2025-09-07

## 2025-08-29 RX ORDER — ATOGEPANT 60 MG/1
60 TABLET ORAL DAILY
Qty: 90 TABLET | Refills: 1 | Status: SHIPPED | OUTPATIENT
Start: 2025-08-29

## 2025-08-29 RX ORDER — ATORVASTATIN CALCIUM 20 MG/1
20 TABLET, FILM COATED ORAL DAILY
COMMUNITY

## 2025-08-29 RX ORDER — LISDEXAMFETAMINE DIMESYLATE 20 MG/1
20 CAPSULE ORAL EVERY MORNING
Qty: 30 CAPSULE | Refills: 0 | Status: SHIPPED | OUTPATIENT
Start: 2025-10-06

## 2025-08-29 RX ORDER — LISDEXAMFETAMINE DIMESYLATE 20 MG/1
20 CAPSULE ORAL EVERY MORNING
Qty: 30 CAPSULE | Refills: 0 | Status: SHIPPED | OUTPATIENT
Start: 2025-11-05

## 2025-08-29 ASSESSMENT — PATIENT HEALTH QUESTIONNAIRE - PHQ9
SUM OF ALL RESPONSES TO PHQ9 QUESTIONS 1 AND 2: 0
2. FEELING DOWN, DEPRESSED OR HOPELESS: NOT AT ALL
2. FEELING DOWN, DEPRESSED OR HOPELESS: NOT AT ALL
1. LITTLE INTEREST OR PLEASURE IN DOING THINGS: NOT AT ALL
1. LITTLE INTEREST OR PLEASURE IN DOING THINGS: NOT AT ALL
SUM OF ALL RESPONSES TO PHQ9 QUESTIONS 1 & 2: 0

## 2025-08-29 ASSESSMENT — ENCOUNTER SYMPTOMS
DEPRESSION: 0
LOSS OF SENSATION IN FEET: 0
OCCASIONAL FEELINGS OF UNSTEADINESS: 0

## 2025-09-02 DIAGNOSIS — C83.30 DIFFUSE LARGE B-CELL LYMPHOMA, UNSPECIFIED BODY REGION (MULTI): ICD-10-CM

## 2025-09-04 RX ORDER — APIXABAN 2.5 MG/1
2.5 TABLET, FILM COATED ORAL 2 TIMES DAILY
Qty: 60 TABLET | Refills: 11 | Status: SHIPPED | OUTPATIENT
Start: 2025-09-04

## 2025-09-05 LAB
AMPHET UR-MCNC: 3317 NG/ML
AMPHETAMINES UR QL: POSITIVE NG/ML
BARBITURATES UR QL: NEGATIVE NG/ML
BENZODIAZ UR QL: NEGATIVE NG/ML
BZE UR QL: NEGATIVE NG/ML
CREAT UR-MCNC: 170.8 MG/DL
DRUG SCREEN COMMENT UR-IMP: ABNORMAL
FENTANYL UR QL SCN: NEGATIVE NG/ML
METHADONE UR QL: NEGATIVE NG/ML
METHAMPHET UR-MCNC: NEGATIVE NG/ML
OPIATES UR QL: NEGATIVE NG/ML
OXIDANTS UR QL: NEGATIVE MCG/ML
OXYCODONE UR QL: NEGATIVE NG/ML
PCP UR QL: NEGATIVE NG/ML
PH UR: 5.5 [PH] (ref 4.5–9)
QUEST NOTES AND COMMENTS: ABNORMAL
THC UR QL: NEGATIVE NG/ML

## (undated) DEVICE — SINGLE PORT MANIFOLD: Brand: NEPTUNE 2

## (undated) DEVICE — ENDO CARRY-ON PROCEDURE KIT: Brand: ENDO CARRY-ON PROCEDURE KIT

## (undated) DEVICE — BRUSH ENDO CLN L90.5IN SHTH DIA1.7MM BRIST DIA5-7MM 2-6MM

## (undated) DEVICE — TUBE SET 96 MM 64 MM H2O PERISTALTIC STD AUX CHANNEL

## (undated) DEVICE — Device: Brand: ENDO SMARTCAP

## (undated) DEVICE — TUBING, SUCTION, 1/4" X 10', STRAIGHT: Brand: MEDLINE